# Patient Record
Sex: FEMALE
[De-identification: names, ages, dates, MRNs, and addresses within clinical notes are randomized per-mention and may not be internally consistent; named-entity substitution may affect disease eponyms.]

---

## 2018-03-04 ENCOUNTER — HOSPITAL ENCOUNTER (EMERGENCY)
Dept: HOSPITAL 90 - EDH | Age: 68
Discharge: HOME | End: 2018-03-04
Payer: COMMERCIAL

## 2018-03-04 DIAGNOSIS — R00.1: ICD-10-CM

## 2018-03-04 DIAGNOSIS — M19.90: ICD-10-CM

## 2018-03-04 DIAGNOSIS — E07.9: ICD-10-CM

## 2018-03-04 DIAGNOSIS — E78.00: ICD-10-CM

## 2018-03-04 DIAGNOSIS — R21: ICD-10-CM

## 2018-03-04 DIAGNOSIS — I16.9: Primary | ICD-10-CM

## 2018-03-04 DIAGNOSIS — Z90.49: ICD-10-CM

## 2018-03-04 LAB
ALBUMIN SERPL-MCNC: 3.2 G/DL (ref 3.5–5)
ALT SERPL-CCNC: 25 U/L (ref 12–78)
APTT PPP: 24 SEC (ref 26.3–35.5)
AST SERPL-CCNC: 19 U/L (ref 10–37)
BASOPHILS NFR BLD AUTO: 0.7 % (ref 0–5)
BILIRUB SERPL-MCNC: 0.3 MG/DL (ref 0.2–1)
BNP SERPL-MCNC: 123 PG/ML (ref 0–100)
BUN SERPL-MCNC: 36 MG/DL (ref 7–18)
CHLORIDE SERPL-SCNC: 107 MMOL/L (ref 101–111)
CK MB SERPL-MCNC: 0.9 NG/ML (ref 0.5–3.6)
CK SERPL-CCNC: 109 U/L (ref 21–232)
CO2 SERPL-SCNC: 30 MMOL/L (ref 21–32)
CREAT SERPL-MCNC: 1.6 MG/DL (ref 0.5–1.5)
EOSINOPHIL NFR BLD AUTO: 3.3 % (ref 0–8)
ERYTHROCYTE [DISTWIDTH] IN BLOOD BY AUTOMATED COUNT: 14 % (ref 11–15.5)
GFR SERPL CREATININE-BSD FRML MDRD: 34 ML/MIN (ref 60–?)
GLUCOSE SERPL-MCNC: 123 MG/DL (ref 70–105)
HCT VFR BLD AUTO: 35.8 % (ref 36–48)
INR PPP: 0.98 (ref 0.85–1.15)
LYMPHOCYTES NFR SPEC AUTO: 56.6 % (ref 21–51)
MCH RBC QN AUTO: 32.7 PG (ref 27–33)
MCHC RBC AUTO-ENTMCNC: 34.5 G/DL (ref 32–36)
MCV RBC AUTO: 95 FL (ref 79–99)
MONOCYTES NFR BLD AUTO: 6.4 % (ref 3–13)
NEUTROPHILS NFR BLD AUTO: 33 % (ref 40–77)
NRBC BLD MANUAL-RTO: 0 % (ref 0–0.19)
PH UR STRIP: 6 [PH] (ref 5–8)
PLATELET # BLD AUTO: 269 K/UL (ref 130–400)
POTASSIUM SERPL-SCNC: 4.2 MMOL/L (ref 3.5–5.1)
PROT SERPL-MCNC: 7.2 G/DL (ref 6–8.3)
PROT UR QL STRIP: 300
PROTHROMBIN TIME: 10.3 SEC (ref 9.6–11.6)
RBC # BLD AUTO: 3.77 MIL/UL (ref 4–5.5)
RBC #/AREA URNS HPF: (no result) /HPF (ref 0–1)
SODIUM SERPL-SCNC: 144 MMOL/L (ref 136–145)
SP GR UR STRIP: 1.01 (ref 1–1.03)
UROBILINOGEN UR STRIP-MCNC: 1 MG/DL (ref 0.2–1)
WBC # BLD AUTO: 5.8 K/UL (ref 4.8–10.8)
WBC #/AREA URNS HPF: (no result) /HPF (ref 0–1)

## 2018-03-04 PROCEDURE — 85610 PROTHROMBIN TIME: CPT

## 2018-03-04 PROCEDURE — 93005 ELECTROCARDIOGRAM TRACING: CPT

## 2018-03-04 PROCEDURE — 85025 COMPLETE CBC W/AUTO DIFF WBC: CPT

## 2018-03-04 PROCEDURE — 87088 URINE BACTERIA CULTURE: CPT

## 2018-03-04 PROCEDURE — 71045 X-RAY EXAM CHEST 1 VIEW: CPT

## 2018-03-04 PROCEDURE — 81001 URINALYSIS AUTO W/SCOPE: CPT

## 2018-03-04 PROCEDURE — 85730 THROMBOPLASTIN TIME PARTIAL: CPT

## 2018-03-04 PROCEDURE — 82553 CREATINE MB FRACTION: CPT

## 2018-03-04 PROCEDURE — 36415 COLL VENOUS BLD VENIPUNCTURE: CPT

## 2018-03-04 PROCEDURE — 87186 SC STD MICRODIL/AGAR DIL: CPT

## 2018-03-04 PROCEDURE — 84484 ASSAY OF TROPONIN QUANT: CPT

## 2018-03-04 PROCEDURE — 80053 COMPREHEN METABOLIC PANEL: CPT

## 2018-03-04 PROCEDURE — 83880 ASSAY OF NATRIURETIC PEPTIDE: CPT

## 2018-03-04 PROCEDURE — 82550 ASSAY OF CK (CPK): CPT

## 2018-03-07 ENCOUNTER — HOSPITAL ENCOUNTER (EMERGENCY)
Dept: HOSPITAL 90 - EDH | Age: 68
Discharge: HOME | End: 2018-03-07
Payer: COMMERCIAL

## 2018-03-07 DIAGNOSIS — R00.1: ICD-10-CM

## 2018-03-07 DIAGNOSIS — R42: ICD-10-CM

## 2018-03-07 DIAGNOSIS — I10: Primary | ICD-10-CM

## 2018-03-07 DIAGNOSIS — E07.9: ICD-10-CM

## 2018-03-07 DIAGNOSIS — M19.90: ICD-10-CM

## 2018-03-07 DIAGNOSIS — E78.00: ICD-10-CM

## 2018-03-07 DIAGNOSIS — R07.89: ICD-10-CM

## 2018-03-07 LAB
ALBUMIN SERPL-MCNC: 3.4 G/DL (ref 3.5–5)
ALT SERPL-CCNC: 25 U/L (ref 12–78)
AST SERPL-CCNC: 21 U/L (ref 10–37)
BASOPHILS NFR BLD AUTO: 0.8 % (ref 0–5)
BILIRUB SERPL-MCNC: 0.4 MG/DL (ref 0.2–1)
BUN SERPL-MCNC: 35 MG/DL (ref 7–18)
CHLORIDE SERPL-SCNC: 102 MMOL/L (ref 101–111)
CK MB SERPL-MCNC: 1.5 NG/ML (ref 0.5–3.6)
CK SERPL-CCNC: 178 U/L (ref 21–232)
CO2 SERPL-SCNC: 27 MMOL/L (ref 21–32)
CREAT SERPL-MCNC: 1.6 MG/DL (ref 0.5–1.5)
EOSINOPHIL NFR BLD AUTO: 3.5 % (ref 0–8)
ERYTHROCYTE [DISTWIDTH] IN BLOOD BY AUTOMATED COUNT: 14 % (ref 11–15.5)
GFR SERPL CREATININE-BSD FRML MDRD: 34 ML/MIN (ref 60–?)
GLUCOSE SERPL-MCNC: 126 MG/DL (ref 70–105)
HCT VFR BLD AUTO: 37.9 % (ref 36–48)
LYMPHOCYTES NFR SPEC AUTO: 48.1 % (ref 21–51)
MCH RBC QN AUTO: 33.2 PG (ref 27–33)
MCHC RBC AUTO-ENTMCNC: 35.4 G/DL (ref 32–36)
MCV RBC AUTO: 93.8 FL (ref 79–99)
MONOCYTES NFR BLD AUTO: 6.1 % (ref 3–13)
NEUTROPHILS NFR BLD AUTO: 41.5 % (ref 40–77)
NRBC BLD MANUAL-RTO: 0 % (ref 0–0.19)
PLATELET # BLD AUTO: 247 K/UL (ref 130–400)
POTASSIUM SERPL-SCNC: 4.5 MMOL/L (ref 3.5–5.1)
PROT SERPL-MCNC: 7.6 G/DL (ref 6–8.3)
RBC # BLD AUTO: 4.04 MIL/UL (ref 4–5.5)
SODIUM SERPL-SCNC: 139 MMOL/L (ref 136–145)
WBC # BLD AUTO: 6.6 K/UL (ref 4.8–10.8)

## 2018-03-07 PROCEDURE — 36415 COLL VENOUS BLD VENIPUNCTURE: CPT

## 2018-03-07 PROCEDURE — 93005 ELECTROCARDIOGRAM TRACING: CPT

## 2018-03-07 PROCEDURE — 85025 COMPLETE CBC W/AUTO DIFF WBC: CPT

## 2018-03-07 PROCEDURE — 82550 ASSAY OF CK (CPK): CPT

## 2018-03-07 PROCEDURE — 82553 CREATINE MB FRACTION: CPT

## 2018-03-07 PROCEDURE — 71045 X-RAY EXAM CHEST 1 VIEW: CPT

## 2018-03-07 PROCEDURE — 83880 ASSAY OF NATRIURETIC PEPTIDE: CPT

## 2018-03-07 PROCEDURE — 84484 ASSAY OF TROPONIN QUANT: CPT

## 2018-03-07 PROCEDURE — 80053 COMPREHEN METABOLIC PANEL: CPT

## 2018-03-09 ENCOUNTER — HOSPITAL ENCOUNTER (EMERGENCY)
Dept: HOSPITAL 90 - EDH | Age: 68
Discharge: HOME | End: 2018-03-09
Payer: COMMERCIAL

## 2018-03-09 DIAGNOSIS — R07.89: ICD-10-CM

## 2018-03-09 DIAGNOSIS — E11.9: ICD-10-CM

## 2018-03-09 DIAGNOSIS — N28.9: ICD-10-CM

## 2018-03-09 DIAGNOSIS — I10: Primary | ICD-10-CM

## 2018-03-09 DIAGNOSIS — M19.90: ICD-10-CM

## 2018-03-09 DIAGNOSIS — E78.00: ICD-10-CM

## 2018-03-09 DIAGNOSIS — R51: ICD-10-CM

## 2018-03-09 LAB
ALBUMIN SERPL-MCNC: 3.4 G/DL (ref 3.5–5)
ALT SERPL-CCNC: 24 U/L (ref 12–78)
APTT PPP: 21.7 SEC (ref 26.3–35.5)
AST SERPL-CCNC: 21 U/L (ref 10–37)
BASOPHILS NFR BLD AUTO: 1 % (ref 0–5)
BILIRUB SERPL-MCNC: 0.4 MG/DL (ref 0.2–1)
BUN SERPL-MCNC: 42 MG/DL (ref 7–18)
CHLORIDE SERPL-SCNC: 104 MMOL/L (ref 101–111)
CO2 SERPL-SCNC: 29 MMOL/L (ref 21–32)
CREAT SERPL-MCNC: 1.8 MG/DL (ref 0.5–1.5)
EOSINOPHIL NFR BLD AUTO: 3.3 % (ref 0–8)
ERYTHROCYTE [DISTWIDTH] IN BLOOD BY AUTOMATED COUNT: 14 % (ref 11–15.5)
GFR SERPL CREATININE-BSD FRML MDRD: 30 ML/MIN (ref 60–?)
GLUCOSE SERPL-MCNC: 134 MG/DL (ref 70–105)
HCT VFR BLD AUTO: 36.9 % (ref 36–48)
INR PPP: 0.97 (ref 0.85–1.15)
LYMPHOCYTES NFR SPEC AUTO: 46.9 % (ref 21–51)
MCH RBC QN AUTO: 33.1 PG (ref 27–33)
MCHC RBC AUTO-ENTMCNC: 34.2 G/DL (ref 32–36)
MCV RBC AUTO: 96.8 FL (ref 79–99)
MONOCYTES NFR BLD AUTO: 8 % (ref 3–13)
NEUTROPHILS NFR BLD AUTO: 40.8 % (ref 40–77)
NRBC BLD MANUAL-RTO: 0.1 % (ref 0–0.19)
PLATELET # BLD AUTO: 241 K/UL (ref 130–400)
POTASSIUM SERPL-SCNC: 4.2 MMOL/L (ref 3.5–5.1)
PROT SERPL-MCNC: 7.5 G/DL (ref 6–8.3)
PROTHROMBIN TIME: 10.2 SEC (ref 9.6–11.6)
RBC # BLD AUTO: 3.81 MIL/UL (ref 4–5.5)
SODIUM SERPL-SCNC: 142 MMOL/L (ref 136–145)
WBC # BLD AUTO: 5.8 K/UL (ref 4.8–10.8)

## 2018-03-09 PROCEDURE — 70450 CT HEAD/BRAIN W/O DYE: CPT

## 2018-03-09 PROCEDURE — 84484 ASSAY OF TROPONIN QUANT: CPT

## 2018-03-09 PROCEDURE — 36415 COLL VENOUS BLD VENIPUNCTURE: CPT

## 2018-03-09 PROCEDURE — 96375 TX/PRO/DX INJ NEW DRUG ADDON: CPT

## 2018-03-09 PROCEDURE — 85610 PROTHROMBIN TIME: CPT

## 2018-03-09 PROCEDURE — 85730 THROMBOPLASTIN TIME PARTIAL: CPT

## 2018-03-09 PROCEDURE — 99285 EMERGENCY DEPT VISIT HI MDM: CPT

## 2018-03-09 PROCEDURE — 85025 COMPLETE CBC W/AUTO DIFF WBC: CPT

## 2018-03-09 PROCEDURE — 93005 ELECTROCARDIOGRAM TRACING: CPT

## 2018-03-09 PROCEDURE — 80053 COMPREHEN METABOLIC PANEL: CPT

## 2018-03-09 PROCEDURE — 96374 THER/PROPH/DIAG INJ IV PUSH: CPT

## 2018-12-04 ENCOUNTER — HOSPITAL ENCOUNTER (OUTPATIENT)
Dept: HOSPITAL 90 - SHCH | Age: 68
Discharge: HOME | End: 2018-12-04
Attending: INTERNAL MEDICINE
Payer: COMMERCIAL

## 2018-12-04 DIAGNOSIS — I10: Primary | ICD-10-CM

## 2018-12-04 DIAGNOSIS — R06.02: ICD-10-CM

## 2018-12-04 DIAGNOSIS — R06.00: ICD-10-CM

## 2018-12-04 PROCEDURE — 78452 HT MUSCLE IMAGE SPECT MULT: CPT

## 2018-12-04 PROCEDURE — 96374 THER/PROPH/DIAG INJ IV PUSH: CPT

## 2018-12-04 PROCEDURE — 93017 CV STRESS TEST TRACING ONLY: CPT

## 2021-07-24 ENCOUNTER — HOSPITAL ENCOUNTER (OUTPATIENT)
Dept: HOSPITAL 90 - SHCH | Age: 71
Discharge: HOME | End: 2021-07-24
Attending: INTERNAL MEDICINE
Payer: COMMERCIAL

## 2021-07-24 DIAGNOSIS — I10: Primary | ICD-10-CM

## 2021-07-24 PROCEDURE — 93306 TTE W/DOPPLER COMPLETE: CPT

## 2021-07-24 PROCEDURE — 93356 MYOCRD STRAIN IMG SPCKL TRCK: CPT

## 2021-07-30 ENCOUNTER — HOSPITAL ENCOUNTER (OUTPATIENT)
Dept: HOSPITAL 90 - SHCH | Age: 71
Discharge: HOME | End: 2021-07-30
Attending: INTERNAL MEDICINE
Payer: COMMERCIAL

## 2021-07-30 VITALS — WEIGHT: 237 LBS | HEIGHT: 61 IN | BODY MASS INDEX: 44.75 KG/M2

## 2021-07-30 DIAGNOSIS — I10: Primary | ICD-10-CM

## 2021-07-30 DIAGNOSIS — R07.9: ICD-10-CM

## 2021-07-30 PROCEDURE — 93017 CV STRESS TEST TRACING ONLY: CPT

## 2021-07-30 PROCEDURE — 96374 THER/PROPH/DIAG INJ IV PUSH: CPT

## 2021-07-30 PROCEDURE — 78452 HT MUSCLE IMAGE SPECT MULT: CPT

## 2022-05-03 ENCOUNTER — HOSPITAL ENCOUNTER (EMERGENCY)
Dept: HOSPITAL 90 - EDH | Age: 72
Discharge: HOME | End: 2022-05-03
Payer: COMMERCIAL

## 2022-05-03 VITALS — WEIGHT: 244.93 LBS | BODY MASS INDEX: 46.24 KG/M2 | HEIGHT: 61 IN

## 2022-05-03 VITALS — SYSTOLIC BLOOD PRESSURE: 194 MMHG | DIASTOLIC BLOOD PRESSURE: 68 MMHG

## 2022-05-03 DIAGNOSIS — I25.10: ICD-10-CM

## 2022-05-03 DIAGNOSIS — I10: ICD-10-CM

## 2022-05-03 DIAGNOSIS — J20.9: Primary | ICD-10-CM

## 2022-05-03 DIAGNOSIS — E03.9: ICD-10-CM

## 2022-05-03 DIAGNOSIS — E11.9: ICD-10-CM

## 2022-05-03 DIAGNOSIS — M19.90: ICD-10-CM

## 2022-05-03 DIAGNOSIS — Z90.49: ICD-10-CM

## 2022-05-03 DIAGNOSIS — R07.89: ICD-10-CM

## 2022-05-03 DIAGNOSIS — Z79.899: ICD-10-CM

## 2022-05-03 DIAGNOSIS — Z20.822: ICD-10-CM

## 2022-05-03 LAB
ALBUMIN SERPL-MCNC: 2.1 G/DL (ref 3.5–5)
ALT SERPL-CCNC: 84 U/L (ref 12–78)
AST SERPL-CCNC: 46 U/L (ref 10–37)
BASOPHILS NFR BLD AUTO: 0.4 % (ref 0–5)
BILIRUB SERPL-MCNC: 0.2 MG/DL (ref 0.2–1)
BUN SERPL-MCNC: 63 MG/DL (ref 7–18)
CHLORIDE SERPL-SCNC: 115 MMOL/L (ref 101–111)
CO2 SERPL-SCNC: 22 MMOL/L (ref 21–32)
CREAT SERPL-MCNC: 3.2 MG/DL (ref 0.5–1.5)
EOSINOPHIL NFR BLD AUTO: 2.7 % (ref 0–8)
ERYTHROCYTE [DISTWIDTH] IN BLOOD BY AUTOMATED COUNT: 13.9 % (ref 11–15.5)
GFR SERPL CREATININE-BSD FRML MDRD: 15 ML/MIN (ref 60–?)
GLUCOSE SERPL-MCNC: 151 MG/DL (ref 70–105)
HCT VFR BLD AUTO: 33.8 % (ref 36–48)
LYMPHOCYTES NFR SPEC AUTO: 25 % (ref 21–51)
MCH RBC QN AUTO: 30.7 PG (ref 27–33)
MCHC RBC AUTO-ENTMCNC: 31.1 G/DL (ref 32–36)
MCV RBC AUTO: 98.8 FL (ref 79–99)
MONOCYTES NFR BLD AUTO: 5.6 % (ref 3–13)
NEUTROPHILS NFR BLD AUTO: 64.7 % (ref 40–77)
NRBC BLD MANUAL-RTO: 0 % (ref 0–0.19)
PLATELET # BLD AUTO: 207 K/UL (ref 130–400)
POTASSIUM SERPL-SCNC: 4.6 MMOL/L (ref 3.5–5.1)
PROT SERPL-MCNC: 6.4 G/DL (ref 6–8.3)
RBC # BLD AUTO: 3.42 MIL/UL (ref 4–5.5)
SODIUM SERPL-SCNC: 147 MMOL/L (ref 136–145)
WBC # BLD AUTO: 7 K/UL (ref 4.8–10.8)

## 2022-05-03 PROCEDURE — 93005 ELECTROCARDIOGRAM TRACING: CPT

## 2022-05-03 PROCEDURE — 87804 INFLUENZA ASSAY W/OPTIC: CPT

## 2022-05-03 PROCEDURE — 85025 COMPLETE CBC W/AUTO DIFF WBC: CPT

## 2022-05-03 PROCEDURE — 80053 COMPREHEN METABOLIC PANEL: CPT

## 2022-05-03 PROCEDURE — 36415 COLL VENOUS BLD VENIPUNCTURE: CPT

## 2022-05-03 PROCEDURE — 71045 X-RAY EXAM CHEST 1 VIEW: CPT

## 2022-05-03 PROCEDURE — 87635 SARS-COV-2 COVID-19 AMP PRB: CPT

## 2022-05-03 PROCEDURE — 99285 EMERGENCY DEPT VISIT HI MDM: CPT

## 2022-05-03 PROCEDURE — 84484 ASSAY OF TROPONIN QUANT: CPT

## 2022-06-21 ENCOUNTER — HOSPITAL ENCOUNTER (OUTPATIENT)
Dept: HOSPITAL 90 - SHCH | Age: 72
Discharge: HOME | End: 2022-06-21
Attending: INTERNAL MEDICINE
Payer: COMMERCIAL

## 2022-06-21 DIAGNOSIS — I25.119: ICD-10-CM

## 2022-06-21 DIAGNOSIS — I08.2: Primary | ICD-10-CM

## 2022-06-21 DIAGNOSIS — I27.20: ICD-10-CM

## 2022-06-21 PROCEDURE — 93306 TTE W/DOPPLER COMPLETE: CPT

## 2023-01-11 ENCOUNTER — HOSPITAL ENCOUNTER (INPATIENT)
Dept: HOSPITAL 90 - EDH | Age: 73
LOS: 11 days | Discharge: NURSING, REHAB OR LONG-TERM CARE | DRG: 640 | End: 2023-01-22
Attending: INTERNAL MEDICINE | Admitting: INTERNAL MEDICINE
Payer: COMMERCIAL

## 2023-01-11 VITALS — BODY MASS INDEX: 41.11 KG/M2 | WEIGHT: 232 LBS | HEIGHT: 63 IN

## 2023-01-11 VITALS — SYSTOLIC BLOOD PRESSURE: 136 MMHG | DIASTOLIC BLOOD PRESSURE: 58 MMHG

## 2023-01-11 VITALS — SYSTOLIC BLOOD PRESSURE: 101 MMHG | DIASTOLIC BLOOD PRESSURE: 42 MMHG

## 2023-01-11 DIAGNOSIS — I12.0: ICD-10-CM

## 2023-01-11 DIAGNOSIS — D64.9: ICD-10-CM

## 2023-01-11 DIAGNOSIS — E78.00: ICD-10-CM

## 2023-01-11 DIAGNOSIS — Z20.822: ICD-10-CM

## 2023-01-11 DIAGNOSIS — E87.6: ICD-10-CM

## 2023-01-11 DIAGNOSIS — N18.6: ICD-10-CM

## 2023-01-11 DIAGNOSIS — E66.01: ICD-10-CM

## 2023-01-11 DIAGNOSIS — J96.01: ICD-10-CM

## 2023-01-11 DIAGNOSIS — J81.1: ICD-10-CM

## 2023-01-11 DIAGNOSIS — E87.70: Primary | ICD-10-CM

## 2023-01-11 DIAGNOSIS — Z91.199: ICD-10-CM

## 2023-01-11 DIAGNOSIS — Z79.84: ICD-10-CM

## 2023-01-11 DIAGNOSIS — E11.51: ICD-10-CM

## 2023-01-11 DIAGNOSIS — Z79.82: ICD-10-CM

## 2023-01-11 DIAGNOSIS — E11.22: ICD-10-CM

## 2023-01-11 DIAGNOSIS — Z99.2: ICD-10-CM

## 2023-01-11 LAB
ALBUMIN SERPL-MCNC: 2.5 G/DL (ref 3.5–5)
ALT SERPL-CCNC: 18 U/L (ref 12–78)
APPEARANCE UR: CLEAR
AST SERPL-CCNC: 21 U/L (ref 10–37)
BACTERIA URNS QL MICRO: (no result) /HPF
BASE EXCESS BLDA CALC-SCNC: -12.8 MMOL/L (ref -2–3)
BASOPHILS NFR BLD AUTO: 0.3 % (ref 0–5)
BILIRUB UR QL STRIP: NEGATIVE MG/DL
BNP SERPL-MCNC: 1830 PG/ML (ref 0–100)
BUN SERPL-MCNC: 64 MG/DL (ref 7–18)
CHLORIDE SERPL-SCNC: 109 MMOL/L (ref 101–111)
CO2 SERPL-SCNC: 20 MMOL/L (ref 21–32)
COLOR UR: (no result)
CREAT SERPL-MCNC: 3.7 MG/DL (ref 0.5–1.5)
DEPRECATED SQUAMOUS URNS QL MICRO: (no result) /HPF (ref 0–2)
EOSINOPHIL NFR BLD AUTO: 0.1 % (ref 0–8)
ERYTHROCYTE [DISTWIDTH] IN BLOOD BY AUTOMATED COUNT: 14.1 % (ref 11–15.5)
GFR SERPL CREATININE-BSD FRML MDRD: 13 ML/MIN (ref 60–?)
GLUCOSE SERPL-MCNC: 170 MG/DL (ref 70–105)
GLUCOSE UR STRIP-MCNC: 200 MG/DL
HCO3 BLDA-SCNC: 16 MMOL/L (ref 21–28)
HCT VFR BLD AUTO: 32.5 % (ref 36–48)
HGB UR QL STRIP: NEGATIVE
KETONES UR STRIP-MCNC: NEGATIVE MG/DL
LEUKOCYTE ESTERASE UR QL STRIP: NEGATIVE LEU/UL
LYMPHOCYTES NFR SPEC AUTO: 11.5 % (ref 21–51)
MCH RBC QN AUTO: 31.4 PG (ref 27–33)
MCHC RBC AUTO-ENTMCNC: 31.7 G/DL (ref 32–36)
MCV RBC AUTO: 99.1 FL (ref 79–99)
MONOCYTES NFR BLD AUTO: 6.9 % (ref 3–13)
MUCOUS THREADS URNS QL MICRO: (no result) LPF
NEUTROPHILS NFR BLD AUTO: 79.7 % (ref 40–77)
NITRITE UR QL STRIP: NEGATIVE
NRBC BLD MANUAL-RTO: 0.3 % (ref 0–0.19)
PCO2 BLDA: 49 MMHG (ref 32–45)
PH UR STRIP: 6 [PH] (ref 5–8)
PLATELET # BLD AUTO: 178 K/UL (ref 130–400)
POTASSIUM SERPL-SCNC: 5.1 MMOL/L (ref 3.5–5.1)
PROT SERPL-MCNC: 6.7 G/DL (ref 6–8.3)
PROT UR QL STRIP: 600 MG/DL
RBC # BLD AUTO: 3.28 MIL/UL (ref 4–5.5)
RBC #/AREA URNS HPF: (no result) /HPF (ref 0–1)
SAO2 % BLDA: 83.8 % (ref 95–99)
SODIUM SERPL-SCNC: 139 MMOL/L (ref 136–145)
SP GR UR STRIP: 1.02 (ref 1–1.03)
UROBILINOGEN UR STRIP-MCNC: 0.2 MG/DL (ref 0.2–1)
WBC # BLD AUTO: 9.8 K/UL (ref 4.8–10.8)
WBC #/AREA URNS HPF: (no result) /HPF (ref 0–1)

## 2023-01-11 PROCEDURE — 82040 ASSAY OF SERUM ALBUMIN: CPT

## 2023-01-11 PROCEDURE — 94640 AIRWAY INHALATION TREATMENT: CPT

## 2023-01-11 PROCEDURE — 36580 REPLACE CVAD CATH: CPT

## 2023-01-11 PROCEDURE — 82565 ASSAY OF CREATININE: CPT

## 2023-01-11 PROCEDURE — 82607 VITAMIN B-12: CPT

## 2023-01-11 PROCEDURE — 36556 INSERT NON-TUNNEL CV CATH: CPT

## 2023-01-11 PROCEDURE — 83550 IRON BINDING TEST: CPT

## 2023-01-11 PROCEDURE — 5A09357 ASSISTANCE WITH RESPIRATORY VENTILATION, LESS THAN 24 CONSECUTIVE HOURS, CONTINUOUS POSITIVE AIRWAY PRESSURE: ICD-10-PCS | Performed by: INTERNAL MEDICINE

## 2023-01-11 PROCEDURE — 86704 HEP B CORE ANTIBODY TOTAL: CPT

## 2023-01-11 PROCEDURE — 82948 REAGENT STRIP/BLOOD GLUCOSE: CPT

## 2023-01-11 PROCEDURE — 87040 BLOOD CULTURE FOR BACTERIA: CPT

## 2023-01-11 PROCEDURE — 85014 HEMATOCRIT: CPT

## 2023-01-11 PROCEDURE — 85045 AUTOMATED RETICULOCYTE COUNT: CPT

## 2023-01-11 PROCEDURE — 80061 LIPID PANEL: CPT

## 2023-01-11 PROCEDURE — 82746 ASSAY OF FOLIC ACID SERUM: CPT

## 2023-01-11 PROCEDURE — 85025 COMPLETE CBC W/AUTO DIFF WBC: CPT

## 2023-01-11 PROCEDURE — 84520 ASSAY OF UREA NITROGEN: CPT

## 2023-01-11 PROCEDURE — 86706 HEP B SURFACE ANTIBODY: CPT

## 2023-01-11 PROCEDURE — 71045 X-RAY EXAM CHEST 1 VIEW: CPT

## 2023-01-11 PROCEDURE — 84132 ASSAY OF SERUM POTASSIUM: CPT

## 2023-01-11 PROCEDURE — 85027 COMPLETE CBC AUTOMATED: CPT

## 2023-01-11 PROCEDURE — 87340 HEPATITIS B SURFACE AG IA: CPT

## 2023-01-11 PROCEDURE — 84145 PROCALCITONIN (PCT): CPT

## 2023-01-11 PROCEDURE — 77001 FLUOROGUIDE FOR VEIN DEVICE: CPT

## 2023-01-11 PROCEDURE — 93005 ELECTROCARDIOGRAM TRACING: CPT

## 2023-01-11 PROCEDURE — 93306 TTE W/DOPPLER COMPLETE: CPT

## 2023-01-11 PROCEDURE — 83880 ASSAY OF NATRIURETIC PEPTIDE: CPT

## 2023-01-11 PROCEDURE — 85018 HEMOGLOBIN: CPT

## 2023-01-11 PROCEDURE — 84550 ASSAY OF BLOOD/URIC ACID: CPT

## 2023-01-11 PROCEDURE — 84484 ASSAY OF TROPONIN QUANT: CPT

## 2023-01-11 PROCEDURE — 80048 BASIC METABOLIC PNL TOTAL CA: CPT

## 2023-01-11 PROCEDURE — 94660 CPAP INITIATION&MGMT: CPT

## 2023-01-11 PROCEDURE — 90935 HEMODIALYSIS ONE EVALUATION: CPT

## 2023-01-11 PROCEDURE — 93971 EXTREMITY STUDY: CPT

## 2023-01-11 PROCEDURE — 36415 COLL VENOUS BLD VENIPUNCTURE: CPT

## 2023-01-11 PROCEDURE — 93356 MYOCRD STRAIN IMG SPCKL TRCK: CPT

## 2023-01-11 PROCEDURE — 93970 EXTREMITY STUDY: CPT

## 2023-01-11 PROCEDURE — 84295 ASSAY OF SERUM SODIUM: CPT

## 2023-01-11 PROCEDURE — 81001 URINALYSIS AUTO W/SCOPE: CPT

## 2023-01-11 PROCEDURE — 36600 WITHDRAWAL OF ARTERIAL BLOOD: CPT

## 2023-01-11 PROCEDURE — 82435 ASSAY OF BLOOD CHLORIDE: CPT

## 2023-01-11 PROCEDURE — 87635 SARS-COV-2 COVID-19 AMP PRB: CPT

## 2023-01-11 PROCEDURE — 86701 HIV-1ANTIBODY: CPT

## 2023-01-11 PROCEDURE — 83735 ASSAY OF MAGNESIUM: CPT

## 2023-01-11 PROCEDURE — 82728 ASSAY OF FERRITIN: CPT

## 2023-01-11 PROCEDURE — 83605 ASSAY OF LACTIC ACID: CPT

## 2023-01-11 PROCEDURE — 87390 HIV-1 AG IA: CPT

## 2023-01-11 PROCEDURE — 84443 ASSAY THYROID STIM HORMONE: CPT

## 2023-01-11 PROCEDURE — 82947 ASSAY GLUCOSE BLOOD QUANT: CPT

## 2023-01-11 PROCEDURE — 82803 BLOOD GASES ANY COMBINATION: CPT

## 2023-01-11 PROCEDURE — 85730 THROMBOPLASTIN TIME PARTIAL: CPT

## 2023-01-11 PROCEDURE — 85610 PROTHROMBIN TIME: CPT

## 2023-01-11 PROCEDURE — 83540 ASSAY OF IRON: CPT

## 2023-01-11 PROCEDURE — 84100 ASSAY OF PHOSPHORUS: CPT

## 2023-01-11 PROCEDURE — 97039 UNLISTED MODALITY: CPT

## 2023-01-11 PROCEDURE — 80053 COMPREHEN METABOLIC PANEL: CPT

## 2023-01-11 RX ADMIN — FUROSEMIDE SCH MG: 10 INJECTION, SOLUTION INTRAMUSCULAR; INTRAVENOUS at 20:11

## 2023-01-11 RX ADMIN — FAMOTIDINE SCH MG: 20 TABLET, FILM COATED ORAL at 20:12

## 2023-01-11 RX ADMIN — HEPARIN SODIUM SCH UNIT: 5000 INJECTION, SOLUTION INTRAVENOUS; SUBCUTANEOUS at 14:38

## 2023-01-11 RX ADMIN — PIPERACILLIN SODIUM AND TAZOBACTAM SODIUM SCH MLS/HR: .375; 3 INJECTION, POWDER, LYOPHILIZED, FOR SOLUTION INTRAVENOUS at 20:11

## 2023-01-11 RX ADMIN — SODIUM CHLORIDE SCH UNIT: 9 INJECTION, SOLUTION INTRAVENOUS at 21:00

## 2023-01-11 RX ADMIN — SODIUM CHLORIDE SCH UNIT: 9 INJECTION, SOLUTION INTRAVENOUS at 15:47

## 2023-01-11 RX ADMIN — HEPARIN SODIUM SCH UNIT: 5000 INJECTION, SOLUTION INTRAVENOUS; SUBCUTANEOUS at 20:11

## 2023-01-11 RX ADMIN — FAMOTIDINE SCH MG: 10 INJECTION INTRAVENOUS at 20:12

## 2023-01-12 VITALS — DIASTOLIC BLOOD PRESSURE: 93 MMHG | SYSTOLIC BLOOD PRESSURE: 124 MMHG

## 2023-01-12 VITALS — SYSTOLIC BLOOD PRESSURE: 138 MMHG | DIASTOLIC BLOOD PRESSURE: 61 MMHG

## 2023-01-12 VITALS — DIASTOLIC BLOOD PRESSURE: 56 MMHG | SYSTOLIC BLOOD PRESSURE: 136 MMHG

## 2023-01-12 VITALS — DIASTOLIC BLOOD PRESSURE: 48 MMHG | SYSTOLIC BLOOD PRESSURE: 140 MMHG

## 2023-01-12 VITALS — DIASTOLIC BLOOD PRESSURE: 55 MMHG | SYSTOLIC BLOOD PRESSURE: 136 MMHG

## 2023-01-12 VITALS — SYSTOLIC BLOOD PRESSURE: 143 MMHG | DIASTOLIC BLOOD PRESSURE: 45 MMHG

## 2023-01-12 LAB
ALBUMIN SERPL-MCNC: 2.3 G/DL (ref 3.5–5)
BASOPHILS NFR BLD AUTO: 0.3 % (ref 0–5)
BUN SERPL-MCNC: 76 MG/DL (ref 7–18)
BUN SERPL-MCNC: 83 MG/DL (ref 7–18)
CHLORIDE SERPL-SCNC: 109 MMOL/L (ref 101–111)
CHOLEST SERPL-MCNC: 132 MG/DL (ref ?–200)
CO2 SERPL-SCNC: 18 MMOL/L (ref 21–32)
CREAT SERPL-MCNC: 4.3 MG/DL (ref 0.5–1.5)
CREAT SERPL-MCNC: 5 MG/DL (ref 0.5–1.5)
EOSINOPHIL NFR BLD AUTO: 0.4 % (ref 0–8)
ERYTHROCYTE [DISTWIDTH] IN BLOOD BY AUTOMATED COUNT: 14.1 % (ref 11–15.5)
GFR SERPL CREATININE-BSD FRML MDRD: 11 ML/MIN (ref 60–?)
GFR SERPL CREATININE-BSD FRML MDRD: 9 ML/MIN (ref 60–?)
GLUCOSE SERPL-MCNC: 110 MG/DL (ref 70–105)
HCT VFR BLD AUTO: 28.8 % (ref 36–48)
HCT VFR BLD AUTO: 31.5 % (ref 36–48)
HDLC SERPL-MCNC: 70 MG/DL (ref 35–85)
IRON SATN MFR SERPL: 16.2 % (ref 22–44)
IRON SATN MFR SERPL: 17 % (ref 22–44)
IRON SERPL-MCNC: 36 MCG/DL (ref 50–170)
IRON SERPL-MCNC: 42 MCG/DL (ref 50–170)
LDLC SERPL CALC-MCNC: 37 MG/DL (ref 0–99)
LYMPHOCYTES NFR SPEC AUTO: 18.1 % (ref 21–51)
MAGNESIUM SERPL-MCNC: 2.8 MG/DL (ref 1.8–2.4)
MCH RBC QN AUTO: 31.4 PG (ref 27–33)
MCHC RBC AUTO-ENTMCNC: 31.3 G/DL (ref 32–36)
MCV RBC AUTO: 100.3 FL (ref 79–99)
MONOCYTES NFR BLD AUTO: 8.7 % (ref 3–13)
NEUTROPHILS NFR BLD AUTO: 71.6 % (ref 40–77)
NRBC BLD MANUAL-RTO: 0.4 % (ref 0–0.19)
PHOSPHATE SERPL-MCNC: 7.4 MG/DL (ref 2.5–4.9)
PLATELET # BLD AUTO: 150 K/UL (ref 130–400)
POTASSIUM SERPL-SCNC: 5 MMOL/L (ref 3.5–5.1)
RBC # BLD AUTO: 2.87 MIL/UL (ref 4–5.5)
SODIUM SERPL-SCNC: 135 MMOL/L (ref 136–145)
TIBC SERPL-MCNC: 221 MCG/DL (ref 250–450)
TIBC SERPL-MCNC: 247 MCG/DL (ref 250–450)
TRIGL SERPL-MCNC: 178 MG/DL (ref 30–200)
URATE SERPL-MCNC: 8.9 MG/DL (ref 2.6–7.2)
WBC # BLD AUTO: 7.6 K/UL (ref 4.8–10.8)

## 2023-01-12 PROCEDURE — 5A09357 ASSISTANCE WITH RESPIRATORY VENTILATION, LESS THAN 24 CONSECUTIVE HOURS, CONTINUOUS POSITIVE AIRWAY PRESSURE: ICD-10-PCS | Performed by: INTERNAL MEDICINE

## 2023-01-12 RX ADMIN — FAMOTIDINE SCH MG: 10 INJECTION INTRAVENOUS at 20:28

## 2023-01-12 RX ADMIN — HEPARIN SODIUM SCH UNIT: 5000 INJECTION, SOLUTION INTRAVENOUS; SUBCUTANEOUS at 09:16

## 2023-01-12 RX ADMIN — FUROSEMIDE SCH MG: 10 INJECTION, SOLUTION INTRAMUSCULAR; INTRAVENOUS at 20:33

## 2023-01-12 RX ADMIN — ACETAMINOPHEN PRN MG: 325 TABLET, FILM COATED ORAL at 09:26

## 2023-01-12 RX ADMIN — HEPARIN SODIUM SCH UNIT: 5000 INJECTION, SOLUTION INTRAVENOUS; SUBCUTANEOUS at 20:29

## 2023-01-12 RX ADMIN — SODIUM CHLORIDE SCH UNIT: 9 INJECTION, SOLUTION INTRAVENOUS at 20:58

## 2023-01-12 RX ADMIN — PIPERACILLIN SODIUM AND TAZOBACTAM SODIUM SCH MLS/HR: .375; 3 INJECTION, POWDER, LYOPHILIZED, FOR SOLUTION INTRAVENOUS at 20:29

## 2023-01-12 RX ADMIN — SODIUM CHLORIDE SCH UNIT: 9 INJECTION, SOLUTION INTRAVENOUS at 06:03

## 2023-01-12 RX ADMIN — HEPARIN SODIUM SCH UNIT: 5000 INJECTION, SOLUTION INTRAVENOUS; SUBCUTANEOUS at 13:31

## 2023-01-12 RX ADMIN — SODIUM CHLORIDE SCH MLS/HR: 9 INJECTION, SOLUTION INTRAVENOUS at 19:17

## 2023-01-12 RX ADMIN — SODIUM CHLORIDE SCH UNIT: 9 INJECTION, SOLUTION INTRAVENOUS at 16:30

## 2023-01-12 RX ADMIN — ASCORBIC ACID, FOLIC ACID, NIACIN, THIAMINE, RIBOFLAVIN, PYRIDOXINE, CYANOCOBALAMIN, PANTOTHENIC ACID, BIOTIN SCH CAP: 100; 150; 6; 1; 20; 5; 10; 1.7; 1.5 CAPSULE, LIQUID FILLED ORAL at 09:09

## 2023-01-12 RX ADMIN — SODIUM CHLORIDE SCH UNIT: 9 INJECTION, SOLUTION INTRAVENOUS at 11:23

## 2023-01-12 RX ADMIN — FUROSEMIDE SCH MG: 10 INJECTION, SOLUTION INTRAMUSCULAR; INTRAVENOUS at 09:09

## 2023-01-12 RX ADMIN — FAMOTIDINE SCH MG: 20 TABLET, FILM COATED ORAL at 20:33

## 2023-01-12 RX ADMIN — PIPERACILLIN SODIUM AND TAZOBACTAM SODIUM SCH MLS/HR: .375; 3 INJECTION, POWDER, LYOPHILIZED, FOR SOLUTION INTRAVENOUS at 09:12

## 2023-01-13 VITALS — DIASTOLIC BLOOD PRESSURE: 66 MMHG | SYSTOLIC BLOOD PRESSURE: 163 MMHG

## 2023-01-13 VITALS — SYSTOLIC BLOOD PRESSURE: 140 MMHG | DIASTOLIC BLOOD PRESSURE: 63 MMHG

## 2023-01-13 VITALS — SYSTOLIC BLOOD PRESSURE: 157 MMHG | DIASTOLIC BLOOD PRESSURE: 66 MMHG

## 2023-01-13 VITALS — DIASTOLIC BLOOD PRESSURE: 56 MMHG | SYSTOLIC BLOOD PRESSURE: 122 MMHG

## 2023-01-13 VITALS — SYSTOLIC BLOOD PRESSURE: 160 MMHG | DIASTOLIC BLOOD PRESSURE: 53 MMHG

## 2023-01-13 VITALS — DIASTOLIC BLOOD PRESSURE: 68 MMHG | SYSTOLIC BLOOD PRESSURE: 169 MMHG

## 2023-01-13 VITALS — DIASTOLIC BLOOD PRESSURE: 57 MMHG | SYSTOLIC BLOOD PRESSURE: 135 MMHG

## 2023-01-13 VITALS — DIASTOLIC BLOOD PRESSURE: 74 MMHG | SYSTOLIC BLOOD PRESSURE: 164 MMHG

## 2023-01-13 VITALS — DIASTOLIC BLOOD PRESSURE: 78 MMHG | SYSTOLIC BLOOD PRESSURE: 166 MMHG

## 2023-01-13 VITALS — SYSTOLIC BLOOD PRESSURE: 145 MMHG | DIASTOLIC BLOOD PRESSURE: 68 MMHG

## 2023-01-13 VITALS — DIASTOLIC BLOOD PRESSURE: 72 MMHG | SYSTOLIC BLOOD PRESSURE: 147 MMHG

## 2023-01-13 VITALS — SYSTOLIC BLOOD PRESSURE: 129 MMHG | DIASTOLIC BLOOD PRESSURE: 60 MMHG

## 2023-01-13 VITALS — DIASTOLIC BLOOD PRESSURE: 71 MMHG | SYSTOLIC BLOOD PRESSURE: 164 MMHG

## 2023-01-13 VITALS — SYSTOLIC BLOOD PRESSURE: 164 MMHG | DIASTOLIC BLOOD PRESSURE: 71 MMHG

## 2023-01-13 VITALS — DIASTOLIC BLOOD PRESSURE: 83 MMHG | SYSTOLIC BLOOD PRESSURE: 148 MMHG

## 2023-01-13 VITALS — DIASTOLIC BLOOD PRESSURE: 64 MMHG | SYSTOLIC BLOOD PRESSURE: 136 MMHG

## 2023-01-13 VITALS — DIASTOLIC BLOOD PRESSURE: 72 MMHG | SYSTOLIC BLOOD PRESSURE: 158 MMHG

## 2023-01-13 VITALS — SYSTOLIC BLOOD PRESSURE: 146 MMHG | DIASTOLIC BLOOD PRESSURE: 54 MMHG

## 2023-01-13 VITALS — SYSTOLIC BLOOD PRESSURE: 128 MMHG | DIASTOLIC BLOOD PRESSURE: 91 MMHG

## 2023-01-13 VITALS — SYSTOLIC BLOOD PRESSURE: 148 MMHG | DIASTOLIC BLOOD PRESSURE: 66 MMHG

## 2023-01-13 LAB
APTT PPP: 40 SEC (ref 26.3–35.5)
BASOPHILS NFR BLD AUTO: 0.3 % (ref 0–5)
BUN SERPL-MCNC: 87 MG/DL (ref 7–18)
CHLORIDE SERPL-SCNC: 110 MMOL/L (ref 101–111)
CO2 SERPL-SCNC: 16 MMOL/L (ref 21–32)
CREAT SERPL-MCNC: 5.5 MG/DL (ref 0.5–1.5)
EOSINOPHIL NFR BLD AUTO: 2 % (ref 0–8)
ERYTHROCYTE [DISTWIDTH] IN BLOOD BY AUTOMATED COUNT: 14.1 % (ref 11–15.5)
GFR SERPL CREATININE-BSD FRML MDRD: 8 ML/MIN (ref 60–?)
GLUCOSE SERPL-MCNC: 122 MG/DL (ref 70–105)
HCT VFR BLD AUTO: 30.5 % (ref 36–48)
INR PPP: 0.98 (ref 0.85–1.15)
LYMPHOCYTES NFR SPEC AUTO: 18.5 % (ref 21–51)
MAGNESIUM SERPL-MCNC: 3 MG/DL (ref 1.8–2.4)
MCH RBC QN AUTO: 30.9 PG (ref 27–33)
MCHC RBC AUTO-ENTMCNC: 31.1 G/DL (ref 32–36)
MCV RBC AUTO: 99.3 FL (ref 79–99)
MONOCYTES NFR BLD AUTO: 8.7 % (ref 3–13)
NEUTROPHILS NFR BLD AUTO: 69.3 % (ref 40–77)
NRBC BLD MANUAL-RTO: 0.4 % (ref 0–0.19)
PHOSPHATE SERPL-MCNC: 8.4 MG/DL (ref 2.5–4.9)
PLATELET # BLD AUTO: 168 K/UL (ref 130–400)
POTASSIUM SERPL-SCNC: 5.3 MMOL/L (ref 3.5–5.1)
PROTHROMBIN TIME: 10.7 SEC (ref 9.6–11.6)
RBC # BLD AUTO: 3.07 MIL/UL (ref 4–5.5)
SODIUM SERPL-SCNC: 140 MMOL/L (ref 136–145)
WBC # BLD AUTO: 6.9 K/UL (ref 4.8–10.8)

## 2023-01-13 PROCEDURE — B5181ZA FLUOROSCOPY OF SUPERIOR VENA CAVA USING LOW OSMOLAR CONTRAST, GUIDANCE: ICD-10-PCS

## 2023-01-13 PROCEDURE — 5A09357 ASSISTANCE WITH RESPIRATORY VENTILATION, LESS THAN 24 CONSECUTIVE HOURS, CONTINUOUS POSITIVE AIRWAY PRESSURE: ICD-10-PCS | Performed by: INTERNAL MEDICINE

## 2023-01-13 PROCEDURE — 02HV33Z INSERTION OF INFUSION DEVICE INTO SUPERIOR VENA CAVA, PERCUTANEOUS APPROACH: ICD-10-PCS

## 2023-01-13 PROCEDURE — 5A1D70Z PERFORMANCE OF URINARY FILTRATION, INTERMITTENT, LESS THAN 6 HOURS PER DAY: ICD-10-PCS | Performed by: INTERNAL MEDICINE

## 2023-01-13 RX ADMIN — SODIUM CHLORIDE SCH UNIT: 9 INJECTION, SOLUTION INTRAVENOUS at 11:30

## 2023-01-13 RX ADMIN — FAMOTIDINE SCH MG: 10 INJECTION INTRAVENOUS at 22:06

## 2023-01-13 RX ADMIN — FAMOTIDINE SCH MG: 20 TABLET, FILM COATED ORAL at 21:00

## 2023-01-13 RX ADMIN — ASCORBIC ACID, FOLIC ACID, NIACIN, THIAMINE, RIBOFLAVIN, PYRIDOXINE, CYANOCOBALAMIN, PANTOTHENIC ACID, BIOTIN SCH CAP: 100; 150; 6; 1; 20; 5; 10; 1.7; 1.5 CAPSULE, LIQUID FILLED ORAL at 09:00

## 2023-01-13 RX ADMIN — SODIUM CHLORIDE SCH UNIT: 9 INJECTION, SOLUTION INTRAVENOUS at 21:00

## 2023-01-13 RX ADMIN — FUROSEMIDE SCH MG: 10 INJECTION, SOLUTION INTRAMUSCULAR; INTRAVENOUS at 12:18

## 2023-01-13 RX ADMIN — SODIUM CHLORIDE SCH MLS/HR: 9 INJECTION, SOLUTION INTRAVENOUS at 22:06

## 2023-01-13 RX ADMIN — SODIUM CHLORIDE SCH UNIT: 9 INJECTION, SOLUTION INTRAVENOUS at 16:24

## 2023-01-13 RX ADMIN — HEPARIN SODIUM SCH UNIT: 5000 INJECTION, SOLUTION INTRAVENOUS; SUBCUTANEOUS at 22:29

## 2023-01-13 RX ADMIN — PIPERACILLIN SODIUM AND TAZOBACTAM SODIUM SCH MLS/HR: .375; 3 INJECTION, POWDER, LYOPHILIZED, FOR SOLUTION INTRAVENOUS at 11:54

## 2023-01-13 RX ADMIN — PIPERACILLIN SODIUM AND TAZOBACTAM SODIUM SCH MLS/HR: .375; 3 INJECTION, POWDER, LYOPHILIZED, FOR SOLUTION INTRAVENOUS at 22:06

## 2023-01-13 RX ADMIN — HEPARIN SODIUM SCH UNIT: 5000 INJECTION, SOLUTION INTRAVENOUS; SUBCUTANEOUS at 16:36

## 2023-01-13 RX ADMIN — FUROSEMIDE SCH MG: 10 INJECTION, SOLUTION INTRAMUSCULAR; INTRAVENOUS at 22:06

## 2023-01-13 RX ADMIN — SODIUM CHLORIDE SCH UNIT: 9 INJECTION, SOLUTION INTRAVENOUS at 06:34

## 2023-01-13 RX ADMIN — HEPARIN SODIUM SCH UNIT: 5000 INJECTION, SOLUTION INTRAVENOUS; SUBCUTANEOUS at 09:00

## 2023-01-14 VITALS — DIASTOLIC BLOOD PRESSURE: 53 MMHG | SYSTOLIC BLOOD PRESSURE: 135 MMHG

## 2023-01-14 VITALS — DIASTOLIC BLOOD PRESSURE: 55 MMHG | SYSTOLIC BLOOD PRESSURE: 145 MMHG

## 2023-01-14 VITALS — SYSTOLIC BLOOD PRESSURE: 119 MMHG | DIASTOLIC BLOOD PRESSURE: 52 MMHG

## 2023-01-14 VITALS — SYSTOLIC BLOOD PRESSURE: 175 MMHG | DIASTOLIC BLOOD PRESSURE: 71 MMHG

## 2023-01-14 VITALS — SYSTOLIC BLOOD PRESSURE: 123 MMHG | DIASTOLIC BLOOD PRESSURE: 57 MMHG

## 2023-01-14 VITALS — SYSTOLIC BLOOD PRESSURE: 109 MMHG | DIASTOLIC BLOOD PRESSURE: 54 MMHG

## 2023-01-14 VITALS — SYSTOLIC BLOOD PRESSURE: 127 MMHG | DIASTOLIC BLOOD PRESSURE: 49 MMHG

## 2023-01-14 VITALS — DIASTOLIC BLOOD PRESSURE: 80 MMHG | SYSTOLIC BLOOD PRESSURE: 133 MMHG

## 2023-01-14 VITALS — SYSTOLIC BLOOD PRESSURE: 98 MMHG | DIASTOLIC BLOOD PRESSURE: 52 MMHG

## 2023-01-14 VITALS — DIASTOLIC BLOOD PRESSURE: 47 MMHG | SYSTOLIC BLOOD PRESSURE: 116 MMHG

## 2023-01-14 VITALS — SYSTOLIC BLOOD PRESSURE: 169 MMHG | DIASTOLIC BLOOD PRESSURE: 72 MMHG

## 2023-01-14 VITALS — SYSTOLIC BLOOD PRESSURE: 125 MMHG | DIASTOLIC BLOOD PRESSURE: 62 MMHG

## 2023-01-14 VITALS — SYSTOLIC BLOOD PRESSURE: 130 MMHG | DIASTOLIC BLOOD PRESSURE: 54 MMHG

## 2023-01-14 VITALS — DIASTOLIC BLOOD PRESSURE: 52 MMHG | SYSTOLIC BLOOD PRESSURE: 134 MMHG

## 2023-01-14 VITALS — DIASTOLIC BLOOD PRESSURE: 53 MMHG | SYSTOLIC BLOOD PRESSURE: 120 MMHG

## 2023-01-14 VITALS — SYSTOLIC BLOOD PRESSURE: 96 MMHG | DIASTOLIC BLOOD PRESSURE: 51 MMHG

## 2023-01-14 VITALS — DIASTOLIC BLOOD PRESSURE: 43 MMHG | SYSTOLIC BLOOD PRESSURE: 116 MMHG

## 2023-01-14 VITALS — DIASTOLIC BLOOD PRESSURE: 98 MMHG | SYSTOLIC BLOOD PRESSURE: 133 MMHG

## 2023-01-14 VITALS — SYSTOLIC BLOOD PRESSURE: 162 MMHG | DIASTOLIC BLOOD PRESSURE: 63 MMHG

## 2023-01-14 LAB
ALBUMIN SERPL-MCNC: 2.1 G/DL (ref 3.5–5)
ALT SERPL-CCNC: 17 U/L (ref 12–78)
AST SERPL-CCNC: 19 U/L (ref 10–37)
BUN SERPL-MCNC: 76 MG/DL (ref 7–18)
CHLORIDE SERPL-SCNC: 105 MMOL/L (ref 101–111)
CO2 SERPL-SCNC: 22 MMOL/L (ref 21–32)
CREAT SERPL-MCNC: 5.2 MG/DL (ref 0.5–1.5)
EOSINOPHIL NFR BLD MANUAL: 3 % (ref 1–6)
ERYTHROCYTE [DISTWIDTH] IN BLOOD BY AUTOMATED COUNT: 13.9 % (ref 11–15.5)
GFR SERPL CREATININE-BSD FRML MDRD: 9 ML/MIN (ref 60–?)
GLUCOSE SERPL-MCNC: 100 MG/DL (ref 70–105)
HCT VFR BLD AUTO: 29.8 % (ref 36–48)
LYMPHOCYTES NFR BLD MANUAL: 13 % (ref 22–44)
MANUAL DIF COMMENT BLD-IMP: (no result)
MCH RBC QN AUTO: 31.1 PG (ref 27–33)
MCHC RBC AUTO-ENTMCNC: 32.2 G/DL (ref 32–36)
MCV RBC AUTO: 96.4 FL (ref 79–99)
MONOCYTES NFR BLD MANUAL: 7 % (ref 2–9)
NEUTS BAND NFR BLD MANUAL: 1 % (ref 0–2)
NEUTS SEG NFR BLD MANUAL: 76 % (ref 40–70)
NRBC BLD MANUAL-RTO: 0.3 % (ref 0–0.19)
PLAT MORPH BLD: ADEQUATE
PLATELET # BLD AUTO: 148 K/UL (ref 130–400)
POTASSIUM SERPL-SCNC: 4.1 MMOL/L (ref 3.5–5.1)
PROT SERPL-MCNC: 6.5 G/DL (ref 6–8.3)
RBC # BLD AUTO: 3.09 MIL/UL (ref 4–5.5)
RBC MORPH BLD: (no result)
SODIUM SERPL-SCNC: 138 MMOL/L (ref 136–145)
WBC # BLD AUTO: 8 K/UL (ref 4.8–10.8)

## 2023-01-14 PROCEDURE — 5A1D70Z PERFORMANCE OF URINARY FILTRATION, INTERMITTENT, LESS THAN 6 HOURS PER DAY: ICD-10-PCS | Performed by: INTERNAL MEDICINE

## 2023-01-14 RX ADMIN — FAMOTIDINE SCH MG: 20 TABLET, FILM COATED ORAL at 21:26

## 2023-01-14 RX ADMIN — SODIUM CHLORIDE SCH MLS/HR: 9 INJECTION, SOLUTION INTRAVENOUS at 21:26

## 2023-01-14 RX ADMIN — ACETAMINOPHEN PRN MG: 325 TABLET, FILM COATED ORAL at 09:19

## 2023-01-14 RX ADMIN — ASCORBIC ACID, FOLIC ACID, NIACIN, THIAMINE, RIBOFLAVIN, PYRIDOXINE, CYANOCOBALAMIN, PANTOTHENIC ACID, BIOTIN SCH CAP: 100; 150; 6; 1; 20; 5; 10; 1.7; 1.5 CAPSULE, LIQUID FILLED ORAL at 09:19

## 2023-01-14 RX ADMIN — SODIUM CHLORIDE SCH UNIT: 9 INJECTION, SOLUTION INTRAVENOUS at 16:29

## 2023-01-14 RX ADMIN — PIPERACILLIN SODIUM AND TAZOBACTAM SODIUM SCH MLS/HR: .375; 3 INJECTION, POWDER, LYOPHILIZED, FOR SOLUTION INTRAVENOUS at 01:32

## 2023-01-14 RX ADMIN — HEPARIN SODIUM SCH UNIT: 5000 INJECTION, SOLUTION INTRAVENOUS; SUBCUTANEOUS at 09:20

## 2023-01-14 RX ADMIN — HEPARIN SODIUM PRN UNIT: 5000 INJECTION, SOLUTION INTRAVENOUS; SUBCUTANEOUS at 14:35

## 2023-01-14 RX ADMIN — FAMOTIDINE SCH MG: 20 TABLET, FILM COATED ORAL at 19:48

## 2023-01-14 RX ADMIN — SODIUM CHLORIDE SCH UNIT: 9 INJECTION, SOLUTION INTRAVENOUS at 11:30

## 2023-01-14 RX ADMIN — PIPERACILLIN SODIUM AND TAZOBACTAM SODIUM SCH MLS/HR: .375; 3 INJECTION, POWDER, LYOPHILIZED, FOR SOLUTION INTRAVENOUS at 21:26

## 2023-01-14 RX ADMIN — PIPERACILLIN SODIUM AND TAZOBACTAM SODIUM SCH MLS/HR: .375; 3 INJECTION, POWDER, LYOPHILIZED, FOR SOLUTION INTRAVENOUS at 09:19

## 2023-01-14 RX ADMIN — SODIUM CHLORIDE SCH UNIT: 9 INJECTION, SOLUTION INTRAVENOUS at 20:53

## 2023-01-14 RX ADMIN — SODIUM CHLORIDE SCH UNIT: 9 INJECTION, SOLUTION INTRAVENOUS at 06:34

## 2023-01-14 RX ADMIN — ACETAMINOPHEN PRN MG: 325 TABLET, FILM COATED ORAL at 21:44

## 2023-01-14 RX ADMIN — HEPARIN SODIUM SCH UNIT: 5000 INJECTION, SOLUTION INTRAVENOUS; SUBCUTANEOUS at 13:58

## 2023-01-14 RX ADMIN — FAMOTIDINE SCH MG: 10 INJECTION INTRAVENOUS at 20:54

## 2023-01-14 RX ADMIN — HEPARIN SODIUM SCH UNIT: 5000 INJECTION, SOLUTION INTRAVENOUS; SUBCUTANEOUS at 21:45

## 2023-01-15 VITALS — SYSTOLIC BLOOD PRESSURE: 155 MMHG | DIASTOLIC BLOOD PRESSURE: 65 MMHG

## 2023-01-15 VITALS — DIASTOLIC BLOOD PRESSURE: 51 MMHG | SYSTOLIC BLOOD PRESSURE: 138 MMHG

## 2023-01-15 VITALS — SYSTOLIC BLOOD PRESSURE: 129 MMHG | DIASTOLIC BLOOD PRESSURE: 58 MMHG

## 2023-01-15 VITALS — DIASTOLIC BLOOD PRESSURE: 51 MMHG | SYSTOLIC BLOOD PRESSURE: 130 MMHG

## 2023-01-15 VITALS — SYSTOLIC BLOOD PRESSURE: 132 MMHG | DIASTOLIC BLOOD PRESSURE: 49 MMHG

## 2023-01-15 VITALS — DIASTOLIC BLOOD PRESSURE: 44 MMHG | SYSTOLIC BLOOD PRESSURE: 116 MMHG

## 2023-01-15 VITALS — SYSTOLIC BLOOD PRESSURE: 117 MMHG | DIASTOLIC BLOOD PRESSURE: 63 MMHG

## 2023-01-15 VITALS — SYSTOLIC BLOOD PRESSURE: 147 MMHG | DIASTOLIC BLOOD PRESSURE: 56 MMHG

## 2023-01-15 LAB
BUN SERPL-MCNC: 59 MG/DL (ref 7–18)
CHLORIDE SERPL-SCNC: 103 MMOL/L (ref 101–111)
CO2 SERPL-SCNC: 25 MMOL/L (ref 21–32)
CREAT SERPL-MCNC: 5 MG/DL (ref 0.5–1.5)
EOSINOPHIL NFR BLD MANUAL: 4 % (ref 1–6)
ERYTHROCYTE [DISTWIDTH] IN BLOOD BY AUTOMATED COUNT: 14.1 % (ref 11–15.5)
GFR SERPL CREATININE-BSD FRML MDRD: 9 ML/MIN (ref 60–?)
GLUCOSE SERPL-MCNC: 121 MG/DL (ref 70–105)
HCT VFR BLD AUTO: 30.3 % (ref 36–48)
LYMPHOCYTES NFR BLD MANUAL: 18 % (ref 22–44)
MAGNESIUM SERPL-MCNC: 2.5 MG/DL (ref 1.8–2.4)
MANUAL DIF COMMENT BLD-IMP: (no result)
MCH RBC QN AUTO: 30.7 PG (ref 27–33)
MCHC RBC AUTO-ENTMCNC: 32 G/DL (ref 32–36)
MCV RBC AUTO: 95.9 FL (ref 79–99)
MONOCYTES NFR BLD MANUAL: 7 % (ref 2–9)
NEUTS SEG NFR BLD MANUAL: 71 % (ref 40–70)
NRBC BLD MANUAL-RTO: 0.4 % (ref 0–0.19)
PLAT MORPH BLD: ADEQUATE
PLATELET # BLD AUTO: 117 K/UL (ref 130–400)
POTASSIUM SERPL-SCNC: 3.4 MMOL/L (ref 3.5–5.1)
RBC # BLD AUTO: 3.16 MIL/UL (ref 4–5.5)
RBC MORPH BLD: (no result)
SODIUM SERPL-SCNC: 139 MMOL/L (ref 136–145)
WBC # BLD AUTO: 6.9 K/UL (ref 4.8–10.8)

## 2023-01-15 PROCEDURE — 5A09357 ASSISTANCE WITH RESPIRATORY VENTILATION, LESS THAN 24 CONSECUTIVE HOURS, CONTINUOUS POSITIVE AIRWAY PRESSURE: ICD-10-PCS | Performed by: INTERNAL MEDICINE

## 2023-01-15 RX ADMIN — HEPARIN SODIUM SCH UNIT: 5000 INJECTION, SOLUTION INTRAVENOUS; SUBCUTANEOUS at 09:11

## 2023-01-15 RX ADMIN — HEPARIN SODIUM SCH UNIT: 5000 INJECTION, SOLUTION INTRAVENOUS; SUBCUTANEOUS at 20:36

## 2023-01-15 RX ADMIN — SODIUM CHLORIDE SCH UNIT: 9 INJECTION, SOLUTION INTRAVENOUS at 20:12

## 2023-01-15 RX ADMIN — FAMOTIDINE SCH MG: 10 INJECTION INTRAVENOUS at 19:49

## 2023-01-15 RX ADMIN — AMLODIPINE BESYLATE SCH MG: 5 TABLET ORAL at 09:10

## 2023-01-15 RX ADMIN — SODIUM CHLORIDE SCH UNIT: 9 INJECTION, SOLUTION INTRAVENOUS at 06:19

## 2023-01-15 RX ADMIN — SODIUM CHLORIDE SCH UNIT: 9 INJECTION, SOLUTION INTRAVENOUS at 11:30

## 2023-01-15 RX ADMIN — ASCORBIC ACID, FOLIC ACID, NIACIN, THIAMINE, RIBOFLAVIN, PYRIDOXINE, CYANOCOBALAMIN, PANTOTHENIC ACID, BIOTIN SCH CAP: 100; 150; 6; 1; 20; 5; 10; 1.7; 1.5 CAPSULE, LIQUID FILLED ORAL at 09:10

## 2023-01-15 RX ADMIN — SODIUM CHLORIDE SCH UNIT: 9 INJECTION, SOLUTION INTRAVENOUS at 16:26

## 2023-01-15 RX ADMIN — PIPERACILLIN SODIUM AND TAZOBACTAM SODIUM SCH MLS/HR: .375; 3 INJECTION, POWDER, LYOPHILIZED, FOR SOLUTION INTRAVENOUS at 09:09

## 2023-01-15 RX ADMIN — PIPERACILLIN SODIUM AND TAZOBACTAM SODIUM SCH MLS/HR: .375; 3 INJECTION, POWDER, LYOPHILIZED, FOR SOLUTION INTRAVENOUS at 20:26

## 2023-01-15 RX ADMIN — ACETAMINOPHEN PRN MG: 325 TABLET, FILM COATED ORAL at 20:27

## 2023-01-15 RX ADMIN — FAMOTIDINE SCH MG: 20 TABLET, FILM COATED ORAL at 20:26

## 2023-01-15 RX ADMIN — HEPARIN SODIUM SCH UNIT: 5000 INJECTION, SOLUTION INTRAVENOUS; SUBCUTANEOUS at 13:58

## 2023-01-15 RX ADMIN — ACETAMINOPHEN PRN MG: 325 TABLET, FILM COATED ORAL at 09:10

## 2023-01-16 VITALS — DIASTOLIC BLOOD PRESSURE: 55 MMHG | SYSTOLIC BLOOD PRESSURE: 134 MMHG

## 2023-01-16 VITALS — SYSTOLIC BLOOD PRESSURE: 131 MMHG | DIASTOLIC BLOOD PRESSURE: 52 MMHG

## 2023-01-16 VITALS — DIASTOLIC BLOOD PRESSURE: 44 MMHG | SYSTOLIC BLOOD PRESSURE: 130 MMHG

## 2023-01-16 VITALS — SYSTOLIC BLOOD PRESSURE: 150 MMHG | DIASTOLIC BLOOD PRESSURE: 57 MMHG

## 2023-01-16 VITALS — SYSTOLIC BLOOD PRESSURE: 139 MMHG | DIASTOLIC BLOOD PRESSURE: 58 MMHG

## 2023-01-16 VITALS — SYSTOLIC BLOOD PRESSURE: 153 MMHG | DIASTOLIC BLOOD PRESSURE: 58 MMHG

## 2023-01-16 VITALS — SYSTOLIC BLOOD PRESSURE: 131 MMHG | DIASTOLIC BLOOD PRESSURE: 51 MMHG

## 2023-01-16 VITALS — SYSTOLIC BLOOD PRESSURE: 139 MMHG | DIASTOLIC BLOOD PRESSURE: 65 MMHG

## 2023-01-16 VITALS — SYSTOLIC BLOOD PRESSURE: 120 MMHG | DIASTOLIC BLOOD PRESSURE: 49 MMHG

## 2023-01-16 VITALS — DIASTOLIC BLOOD PRESSURE: 48 MMHG | SYSTOLIC BLOOD PRESSURE: 136 MMHG

## 2023-01-16 VITALS — DIASTOLIC BLOOD PRESSURE: 54 MMHG | SYSTOLIC BLOOD PRESSURE: 152 MMHG

## 2023-01-16 VITALS — SYSTOLIC BLOOD PRESSURE: 153 MMHG | DIASTOLIC BLOOD PRESSURE: 65 MMHG

## 2023-01-16 VITALS — DIASTOLIC BLOOD PRESSURE: 59 MMHG | SYSTOLIC BLOOD PRESSURE: 154 MMHG

## 2023-01-16 VITALS — SYSTOLIC BLOOD PRESSURE: 127 MMHG | DIASTOLIC BLOOD PRESSURE: 63 MMHG

## 2023-01-16 VITALS — DIASTOLIC BLOOD PRESSURE: 76 MMHG | SYSTOLIC BLOOD PRESSURE: 149 MMHG

## 2023-01-16 LAB
BASOPHILS NFR BLD AUTO: 0.4 % (ref 0–5)
BUN SERPL-MCNC: 65 MG/DL (ref 7–18)
CHLORIDE SERPL-SCNC: 106 MMOL/L (ref 101–111)
CO2 SERPL-SCNC: 24 MMOL/L (ref 21–32)
CREAT SERPL-MCNC: 5.5 MG/DL (ref 0.5–1.5)
EOSINOPHIL NFR BLD AUTO: 1.6 % (ref 0–8)
ERYTHROCYTE [DISTWIDTH] IN BLOOD BY AUTOMATED COUNT: 14.1 % (ref 11–15.5)
GFR SERPL CREATININE-BSD FRML MDRD: 8 ML/MIN (ref 60–?)
GLUCOSE SERPL-MCNC: 112 MG/DL (ref 70–105)
HCT VFR BLD AUTO: 29.7 % (ref 36–48)
LYMPHOCYTES NFR SPEC AUTO: 17.5 % (ref 21–51)
MAGNESIUM SERPL-MCNC: 2.4 MG/DL (ref 1.8–2.4)
MCH RBC QN AUTO: 31.4 PG (ref 27–33)
MCHC RBC AUTO-ENTMCNC: 32 G/DL (ref 32–36)
MCV RBC AUTO: 98 FL (ref 79–99)
MONOCYTES NFR BLD AUTO: 12.2 % (ref 3–13)
NEUTROPHILS NFR BLD AUTO: 66.8 % (ref 40–77)
NRBC BLD MANUAL-RTO: 0.4 % (ref 0–0.19)
PHOSPHATE SERPL-MCNC: 8 MG/DL (ref 2.5–4.9)
PLATELET # BLD AUTO: 103 K/UL (ref 130–400)
POTASSIUM SERPL-SCNC: 3.3 MMOL/L (ref 3.5–5.1)
RBC # BLD AUTO: 3.03 MIL/UL (ref 4–5.5)
SODIUM SERPL-SCNC: 142 MMOL/L (ref 136–145)
WBC # BLD AUTO: 7.4 K/UL (ref 4.8–10.8)

## 2023-01-16 PROCEDURE — 5A09357 ASSISTANCE WITH RESPIRATORY VENTILATION, LESS THAN 24 CONSECUTIVE HOURS, CONTINUOUS POSITIVE AIRWAY PRESSURE: ICD-10-PCS | Performed by: INTERNAL MEDICINE

## 2023-01-16 PROCEDURE — 5A1D70Z PERFORMANCE OF URINARY FILTRATION, INTERMITTENT, LESS THAN 6 HOURS PER DAY: ICD-10-PCS | Performed by: INTERNAL MEDICINE

## 2023-01-16 RX ADMIN — SODIUM CHLORIDE SCH UNIT: 9 INJECTION, SOLUTION INTRAVENOUS at 16:30

## 2023-01-16 RX ADMIN — PIPERACILLIN SODIUM AND TAZOBACTAM SODIUM SCH MLS/HR: .375; 3 INJECTION, POWDER, LYOPHILIZED, FOR SOLUTION INTRAVENOUS at 21:16

## 2023-01-16 RX ADMIN — PIPERACILLIN SODIUM AND TAZOBACTAM SODIUM SCH MLS/HR: .375; 3 INJECTION, POWDER, LYOPHILIZED, FOR SOLUTION INTRAVENOUS at 11:02

## 2023-01-16 RX ADMIN — HEPARIN SODIUM SCH UNIT: 5000 INJECTION, SOLUTION INTRAVENOUS; SUBCUTANEOUS at 11:03

## 2023-01-16 RX ADMIN — FAMOTIDINE SCH MG: 20 TABLET, FILM COATED ORAL at 21:16

## 2023-01-16 RX ADMIN — AMLODIPINE BESYLATE SCH MG: 5 TABLET ORAL at 09:00

## 2023-01-16 RX ADMIN — HEPARIN SODIUM SCH UNIT: 5000 INJECTION, SOLUTION INTRAVENOUS; SUBCUTANEOUS at 21:29

## 2023-01-16 RX ADMIN — SODIUM CHLORIDE SCH UNIT: 9 INJECTION, SOLUTION INTRAVENOUS at 11:30

## 2023-01-16 RX ADMIN — HEPARIN SODIUM SCH UNIT: 5000 INJECTION, SOLUTION INTRAVENOUS; SUBCUTANEOUS at 14:00

## 2023-01-16 RX ADMIN — ACETAMINOPHEN PRN MG: 325 TABLET, FILM COATED ORAL at 11:11

## 2023-01-16 RX ADMIN — SODIUM CHLORIDE SCH UNIT: 9 INJECTION, SOLUTION INTRAVENOUS at 06:28

## 2023-01-16 RX ADMIN — SODIUM CHLORIDE SCH UNIT: 9 INJECTION, SOLUTION INTRAVENOUS at 21:00

## 2023-01-16 RX ADMIN — ASCORBIC ACID, FOLIC ACID, NIACIN, THIAMINE, RIBOFLAVIN, PYRIDOXINE, CYANOCOBALAMIN, PANTOTHENIC ACID, BIOTIN SCH CAP: 100; 150; 6; 1; 20; 5; 10; 1.7; 1.5 CAPSULE, LIQUID FILLED ORAL at 11:02

## 2023-01-16 RX ADMIN — FAMOTIDINE SCH MG: 10 INJECTION INTRAVENOUS at 21:00

## 2023-01-17 VITALS — SYSTOLIC BLOOD PRESSURE: 146 MMHG | DIASTOLIC BLOOD PRESSURE: 61 MMHG

## 2023-01-17 VITALS — DIASTOLIC BLOOD PRESSURE: 62 MMHG | SYSTOLIC BLOOD PRESSURE: 147 MMHG

## 2023-01-17 VITALS — DIASTOLIC BLOOD PRESSURE: 40 MMHG | SYSTOLIC BLOOD PRESSURE: 147 MMHG

## 2023-01-17 VITALS — DIASTOLIC BLOOD PRESSURE: 44 MMHG | SYSTOLIC BLOOD PRESSURE: 142 MMHG

## 2023-01-17 VITALS — SYSTOLIC BLOOD PRESSURE: 160 MMHG | DIASTOLIC BLOOD PRESSURE: 50 MMHG

## 2023-01-17 VITALS — DIASTOLIC BLOOD PRESSURE: 60 MMHG | SYSTOLIC BLOOD PRESSURE: 168 MMHG

## 2023-01-17 VITALS — DIASTOLIC BLOOD PRESSURE: 43 MMHG | SYSTOLIC BLOOD PRESSURE: 81 MMHG

## 2023-01-17 VITALS — SYSTOLIC BLOOD PRESSURE: 168 MMHG | DIASTOLIC BLOOD PRESSURE: 63 MMHG

## 2023-01-17 VITALS — DIASTOLIC BLOOD PRESSURE: 64 MMHG | SYSTOLIC BLOOD PRESSURE: 158 MMHG

## 2023-01-17 VITALS — SYSTOLIC BLOOD PRESSURE: 125 MMHG | DIASTOLIC BLOOD PRESSURE: 52 MMHG

## 2023-01-17 VITALS — DIASTOLIC BLOOD PRESSURE: 74 MMHG | SYSTOLIC BLOOD PRESSURE: 98 MMHG

## 2023-01-17 VITALS — DIASTOLIC BLOOD PRESSURE: 67 MMHG | SYSTOLIC BLOOD PRESSURE: 136 MMHG

## 2023-01-17 VITALS — DIASTOLIC BLOOD PRESSURE: 50 MMHG | SYSTOLIC BLOOD PRESSURE: 141 MMHG

## 2023-01-17 VITALS — DIASTOLIC BLOOD PRESSURE: 93 MMHG | SYSTOLIC BLOOD PRESSURE: 152 MMHG

## 2023-01-17 VITALS — SYSTOLIC BLOOD PRESSURE: 143 MMHG | DIASTOLIC BLOOD PRESSURE: 46 MMHG

## 2023-01-17 VITALS — DIASTOLIC BLOOD PRESSURE: 60 MMHG | SYSTOLIC BLOOD PRESSURE: 158 MMHG

## 2023-01-17 VITALS — DIASTOLIC BLOOD PRESSURE: 50 MMHG | SYSTOLIC BLOOD PRESSURE: 88 MMHG

## 2023-01-17 VITALS — DIASTOLIC BLOOD PRESSURE: 46 MMHG | SYSTOLIC BLOOD PRESSURE: 132 MMHG

## 2023-01-17 VITALS — SYSTOLIC BLOOD PRESSURE: 158 MMHG | DIASTOLIC BLOOD PRESSURE: 57 MMHG

## 2023-01-17 VITALS — DIASTOLIC BLOOD PRESSURE: 64 MMHG | SYSTOLIC BLOOD PRESSURE: 112 MMHG

## 2023-01-17 VITALS — SYSTOLIC BLOOD PRESSURE: 149 MMHG | DIASTOLIC BLOOD PRESSURE: 67 MMHG

## 2023-01-17 VITALS — SYSTOLIC BLOOD PRESSURE: 162 MMHG | DIASTOLIC BLOOD PRESSURE: 59 MMHG

## 2023-01-17 VITALS — SYSTOLIC BLOOD PRESSURE: 136 MMHG | DIASTOLIC BLOOD PRESSURE: 54 MMHG

## 2023-01-17 LAB
BASOPHILS NFR BLD AUTO: 0.4 % (ref 0–5)
BUN SERPL-MCNC: 51 MG/DL (ref 7–18)
CHLORIDE SERPL-SCNC: 102 MMOL/L (ref 101–111)
CO2 SERPL-SCNC: 25 MMOL/L (ref 21–32)
CREAT SERPL-MCNC: 4.7 MG/DL (ref 0.5–1.5)
EOSINOPHIL NFR BLD AUTO: 2.7 % (ref 0–8)
ERYTHROCYTE [DISTWIDTH] IN BLOOD BY AUTOMATED COUNT: 14.1 % (ref 11–15.5)
GFR SERPL CREATININE-BSD FRML MDRD: 10 ML/MIN (ref 60–?)
GLUCOSE SERPL-MCNC: 121 MG/DL (ref 70–105)
HCT VFR BLD AUTO: 29.8 % (ref 36–48)
IRON SATN MFR SERPL: 19.2 % (ref 22–44)
IRON SERPL-MCNC: 36 MCG/DL (ref 50–170)
LYMPHOCYTES NFR SPEC AUTO: 15 % (ref 21–51)
MCH RBC QN AUTO: 30.8 PG (ref 27–33)
MCHC RBC AUTO-ENTMCNC: 32.6 G/DL (ref 32–36)
MCV RBC AUTO: 94.6 FL (ref 79–99)
MONOCYTES NFR BLD AUTO: 12.2 % (ref 3–13)
NEUTROPHILS NFR BLD AUTO: 68.5 % (ref 40–77)
NRBC BLD MANUAL-RTO: 0.5 % (ref 0–0.19)
PLATELET # BLD AUTO: 110 K/UL (ref 130–400)
POTASSIUM SERPL-SCNC: 3.2 MMOL/L (ref 3.5–5.1)
RBC # BLD AUTO: 3.15 MIL/UL (ref 4–5.5)
SODIUM SERPL-SCNC: 138 MMOL/L (ref 136–145)
TIBC SERPL-MCNC: 187 MCG/DL (ref 250–450)
TSH SERPL DL<=0.05 MIU/L-ACNC: 2.54 UIU/ML (ref 0.36–3.74)
WBC # BLD AUTO: 8.3 K/UL (ref 4.8–10.8)

## 2023-01-17 PROCEDURE — 02H633Z INSERTION OF INFUSION DEVICE INTO RIGHT ATRIUM, PERCUTANEOUS APPROACH: ICD-10-PCS | Performed by: RADIOLOGY

## 2023-01-17 PROCEDURE — 0JH63XZ INSERTION OF TUNNELED VASCULAR ACCESS DEVICE INTO CHEST SUBCUTANEOUS TISSUE AND FASCIA, PERCUTANEOUS APPROACH: ICD-10-PCS | Performed by: RADIOLOGY

## 2023-01-17 PROCEDURE — B5181ZA FLUOROSCOPY OF SUPERIOR VENA CAVA USING LOW OSMOLAR CONTRAST, GUIDANCE: ICD-10-PCS | Performed by: RADIOLOGY

## 2023-01-17 PROCEDURE — 5A1D70Z PERFORMANCE OF URINARY FILTRATION, INTERMITTENT, LESS THAN 6 HOURS PER DAY: ICD-10-PCS | Performed by: INTERNAL MEDICINE

## 2023-01-17 RX ADMIN — PIPERACILLIN SODIUM AND TAZOBACTAM SODIUM SCH MLS/HR: .375; 3 INJECTION, POWDER, LYOPHILIZED, FOR SOLUTION INTRAVENOUS at 21:28

## 2023-01-17 RX ADMIN — SODIUM CHLORIDE SCH UNIT: 9 INJECTION, SOLUTION INTRAVENOUS at 11:30

## 2023-01-17 RX ADMIN — SODIUM CHLORIDE SCH UNIT: 9 INJECTION, SOLUTION INTRAVENOUS at 21:00

## 2023-01-17 RX ADMIN — FAMOTIDINE SCH MG: 10 INJECTION INTRAVENOUS at 21:00

## 2023-01-17 RX ADMIN — FAMOTIDINE SCH MG: 20 TABLET, FILM COATED ORAL at 21:17

## 2023-01-17 RX ADMIN — ACETAMINOPHEN PRN MG: 325 TABLET, FILM COATED ORAL at 22:22

## 2023-01-17 RX ADMIN — SODIUM CHLORIDE SCH UNIT: 9 INJECTION, SOLUTION INTRAVENOUS at 06:26

## 2023-01-17 RX ADMIN — HEPARIN SODIUM PRN UNIT: 5000 INJECTION, SOLUTION INTRAVENOUS; SUBCUTANEOUS at 14:56

## 2023-01-17 RX ADMIN — POTASSIUM CHLORIDE PRN MEQ: 1500 TABLET, EXTENDED RELEASE ORAL at 17:49

## 2023-01-17 RX ADMIN — AMLODIPINE BESYLATE SCH MG: 5 TABLET ORAL at 09:00

## 2023-01-17 RX ADMIN — ACETAMINOPHEN PRN MG: 325 TABLET, FILM COATED ORAL at 09:34

## 2023-01-17 RX ADMIN — HEPARIN SODIUM SCH UNIT: 5000 INJECTION, SOLUTION INTRAVENOUS; SUBCUTANEOUS at 09:00

## 2023-01-17 RX ADMIN — POTASSIUM CHLORIDE PRN MEQ: 1500 TABLET, EXTENDED RELEASE ORAL at 14:53

## 2023-01-17 RX ADMIN — ASCORBIC ACID, FOLIC ACID, NIACIN, THIAMINE, RIBOFLAVIN, PYRIDOXINE, CYANOCOBALAMIN, PANTOTHENIC ACID, BIOTIN SCH CAP: 100; 150; 6; 1; 20; 5; 10; 1.7; 1.5 CAPSULE, LIQUID FILLED ORAL at 09:34

## 2023-01-17 RX ADMIN — HEPARIN SODIUM SCH UNIT: 5000 INJECTION, SOLUTION INTRAVENOUS; SUBCUTANEOUS at 21:23

## 2023-01-17 RX ADMIN — BISACODYL SCH MG: 5 TABLET, COATED ORAL at 21:00

## 2023-01-17 RX ADMIN — HEPARIN SODIUM SCH UNIT: 5000 INJECTION, SOLUTION INTRAVENOUS; SUBCUTANEOUS at 14:46

## 2023-01-17 RX ADMIN — PIPERACILLIN SODIUM AND TAZOBACTAM SODIUM SCH MLS/HR: .375; 3 INJECTION, POWDER, LYOPHILIZED, FOR SOLUTION INTRAVENOUS at 14:43

## 2023-01-17 RX ADMIN — SODIUM CHLORIDE SCH UNIT: 9 INJECTION, SOLUTION INTRAVENOUS at 16:30

## 2023-01-18 VITALS — DIASTOLIC BLOOD PRESSURE: 64 MMHG | SYSTOLIC BLOOD PRESSURE: 153 MMHG

## 2023-01-18 VITALS — DIASTOLIC BLOOD PRESSURE: 48 MMHG | SYSTOLIC BLOOD PRESSURE: 135 MMHG

## 2023-01-18 VITALS — SYSTOLIC BLOOD PRESSURE: 123 MMHG | DIASTOLIC BLOOD PRESSURE: 47 MMHG

## 2023-01-18 LAB
BASOPHILS NFR BLD AUTO: 0.2 % (ref 0–5)
BUN SERPL-MCNC: 45 MG/DL (ref 7–18)
CHLORIDE SERPL-SCNC: 99 MMOL/L (ref 101–111)
CO2 SERPL-SCNC: 26 MMOL/L (ref 21–32)
CREAT SERPL-MCNC: 4.3 MG/DL (ref 0.5–1.5)
EOSINOPHIL NFR BLD AUTO: 1.9 % (ref 0–8)
ERYTHROCYTE [DISTWIDTH] IN BLOOD BY AUTOMATED COUNT: 14.4 % (ref 11–15.5)
GFR SERPL CREATININE-BSD FRML MDRD: 11 ML/MIN (ref 60–?)
GLUCOSE SERPL-MCNC: 99 MG/DL (ref 70–105)
HCT VFR BLD AUTO: 30.1 % (ref 36–48)
LYMPHOCYTES NFR SPEC AUTO: 20.8 % (ref 21–51)
MCH RBC QN AUTO: 31 PG (ref 27–33)
MCHC RBC AUTO-ENTMCNC: 31.9 G/DL (ref 32–36)
MCV RBC AUTO: 97.1 FL (ref 79–99)
MONOCYTES NFR BLD AUTO: 11.4 % (ref 3–13)
NEUTROPHILS NFR BLD AUTO: 64.3 % (ref 40–77)
NRBC BLD MANUAL-RTO: 0.2 % (ref 0–0.19)
PLATELET # BLD AUTO: 113 K/UL (ref 130–400)
POTASSIUM SERPL-SCNC: 3.3 MMOL/L (ref 3.5–5.1)
RBC # BLD AUTO: 3.1 MIL/UL (ref 4–5.5)
SODIUM SERPL-SCNC: 136 MMOL/L (ref 136–145)
WBC # BLD AUTO: 8.9 K/UL (ref 4.8–10.8)

## 2023-01-18 RX ADMIN — ASCORBIC ACID, FOLIC ACID, NIACIN, THIAMINE, RIBOFLAVIN, PYRIDOXINE, CYANOCOBALAMIN, PANTOTHENIC ACID, BIOTIN SCH CAP: 100; 150; 6; 1; 20; 5; 10; 1.7; 1.5 CAPSULE, LIQUID FILLED ORAL at 09:20

## 2023-01-18 RX ADMIN — FAMOTIDINE SCH MG: 10 INJECTION INTRAVENOUS at 20:24

## 2023-01-18 RX ADMIN — POTASSIUM CHLORIDE PRN MEQ: 1500 TABLET, EXTENDED RELEASE ORAL at 13:35

## 2023-01-18 RX ADMIN — HEPARIN SODIUM SCH UNIT: 5000 INJECTION, SOLUTION INTRAVENOUS; SUBCUTANEOUS at 13:40

## 2023-01-18 RX ADMIN — FAMOTIDINE SCH MG: 20 TABLET, FILM COATED ORAL at 20:22

## 2023-01-18 RX ADMIN — PIPERACILLIN SODIUM AND TAZOBACTAM SODIUM SCH MLS/HR: .375; 3 INJECTION, POWDER, LYOPHILIZED, FOR SOLUTION INTRAVENOUS at 20:22

## 2023-01-18 RX ADMIN — BISACODYL SCH MG: 5 TABLET, COATED ORAL at 09:00

## 2023-01-18 RX ADMIN — SODIUM CHLORIDE SCH UNIT: 9 INJECTION, SOLUTION INTRAVENOUS at 11:30

## 2023-01-18 RX ADMIN — PIPERACILLIN SODIUM AND TAZOBACTAM SODIUM SCH MLS/HR: .375; 3 INJECTION, POWDER, LYOPHILIZED, FOR SOLUTION INTRAVENOUS at 09:23

## 2023-01-18 RX ADMIN — AMLODIPINE BESYLATE SCH MG: 5 TABLET ORAL at 09:22

## 2023-01-18 RX ADMIN — HEPARIN SODIUM SCH UNIT: 5000 INJECTION, SOLUTION INTRAVENOUS; SUBCUTANEOUS at 20:24

## 2023-01-18 RX ADMIN — BISACODYL SCH MG: 5 TABLET, COATED ORAL at 20:22

## 2023-01-18 RX ADMIN — SODIUM CHLORIDE SCH UNIT: 9 INJECTION, SOLUTION INTRAVENOUS at 06:36

## 2023-01-18 RX ADMIN — SODIUM CHLORIDE SCH UNIT: 9 INJECTION, SOLUTION INTRAVENOUS at 20:25

## 2023-01-18 RX ADMIN — HEPARIN SODIUM SCH UNIT: 5000 INJECTION, SOLUTION INTRAVENOUS; SUBCUTANEOUS at 10:48

## 2023-01-18 RX ADMIN — SODIUM CHLORIDE SCH UNIT: 9 INJECTION, SOLUTION INTRAVENOUS at 16:30

## 2023-01-18 RX ADMIN — BISACODYL SCH MG: 5 TABLET, COATED ORAL at 14:00

## 2023-01-18 RX ADMIN — POTASSIUM CHLORIDE PRN MEQ: 1500 TABLET, EXTENDED RELEASE ORAL at 09:21

## 2023-01-19 VITALS — DIASTOLIC BLOOD PRESSURE: 76 MMHG | SYSTOLIC BLOOD PRESSURE: 163 MMHG

## 2023-01-19 VITALS — DIASTOLIC BLOOD PRESSURE: 59 MMHG | SYSTOLIC BLOOD PRESSURE: 162 MMHG

## 2023-01-19 VITALS — DIASTOLIC BLOOD PRESSURE: 62 MMHG | SYSTOLIC BLOOD PRESSURE: 168 MMHG

## 2023-01-19 VITALS — DIASTOLIC BLOOD PRESSURE: 68 MMHG | SYSTOLIC BLOOD PRESSURE: 158 MMHG

## 2023-01-19 VITALS — DIASTOLIC BLOOD PRESSURE: 50 MMHG | SYSTOLIC BLOOD PRESSURE: 139 MMHG

## 2023-01-19 VITALS — SYSTOLIC BLOOD PRESSURE: 168 MMHG | DIASTOLIC BLOOD PRESSURE: 62 MMHG

## 2023-01-19 VITALS — SYSTOLIC BLOOD PRESSURE: 142 MMHG | DIASTOLIC BLOOD PRESSURE: 60 MMHG

## 2023-01-19 VITALS — DIASTOLIC BLOOD PRESSURE: 69 MMHG | SYSTOLIC BLOOD PRESSURE: 165 MMHG

## 2023-01-19 VITALS — DIASTOLIC BLOOD PRESSURE: 60 MMHG | SYSTOLIC BLOOD PRESSURE: 171 MMHG

## 2023-01-19 VITALS — DIASTOLIC BLOOD PRESSURE: 54 MMHG | SYSTOLIC BLOOD PRESSURE: 155 MMHG

## 2023-01-19 VITALS — SYSTOLIC BLOOD PRESSURE: 164 MMHG | DIASTOLIC BLOOD PRESSURE: 56 MMHG

## 2023-01-19 VITALS — DIASTOLIC BLOOD PRESSURE: 78 MMHG | SYSTOLIC BLOOD PRESSURE: 156 MMHG

## 2023-01-19 VITALS — SYSTOLIC BLOOD PRESSURE: 136 MMHG | DIASTOLIC BLOOD PRESSURE: 50 MMHG

## 2023-01-19 VITALS — DIASTOLIC BLOOD PRESSURE: 59 MMHG | SYSTOLIC BLOOD PRESSURE: 116 MMHG

## 2023-01-19 VITALS — DIASTOLIC BLOOD PRESSURE: 74 MMHG | SYSTOLIC BLOOD PRESSURE: 134 MMHG

## 2023-01-19 VITALS — DIASTOLIC BLOOD PRESSURE: 63 MMHG | SYSTOLIC BLOOD PRESSURE: 160 MMHG

## 2023-01-19 VITALS — SYSTOLIC BLOOD PRESSURE: 165 MMHG | DIASTOLIC BLOOD PRESSURE: 59 MMHG

## 2023-01-19 VITALS — DIASTOLIC BLOOD PRESSURE: 66 MMHG | SYSTOLIC BLOOD PRESSURE: 143 MMHG

## 2023-01-19 VITALS — SYSTOLIC BLOOD PRESSURE: 130 MMHG | DIASTOLIC BLOOD PRESSURE: 67 MMHG

## 2023-01-19 VITALS — SYSTOLIC BLOOD PRESSURE: 100 MMHG | DIASTOLIC BLOOD PRESSURE: 32 MMHG

## 2023-01-19 VITALS — SYSTOLIC BLOOD PRESSURE: 130 MMHG | DIASTOLIC BLOOD PRESSURE: 45 MMHG

## 2023-01-19 VITALS — DIASTOLIC BLOOD PRESSURE: 59 MMHG | SYSTOLIC BLOOD PRESSURE: 158 MMHG

## 2023-01-19 LAB
ALBUMIN SERPL-MCNC: 1.8 G/DL (ref 3.5–5)
ALT SERPL-CCNC: 15 U/L (ref 12–78)
AST SERPL-CCNC: 16 U/L (ref 10–37)
BUN SERPL-MCNC: 59 MG/DL (ref 7–18)
CHLORIDE SERPL-SCNC: 100 MMOL/L (ref 101–111)
CO2 SERPL-SCNC: 25 MMOL/L (ref 21–32)
CREAT SERPL-MCNC: 4.9 MG/DL (ref 0.5–1.5)
GFR SERPL CREATININE-BSD FRML MDRD: 9 ML/MIN (ref 60–?)
GLUCOSE SERPL-MCNC: 92 MG/DL (ref 70–105)
POTASSIUM SERPL-SCNC: 3.5 MMOL/L (ref 3.5–5.1)
PROT SERPL-MCNC: 6.3 G/DL (ref 6–8.3)
SODIUM SERPL-SCNC: 137 MMOL/L (ref 136–145)

## 2023-01-19 PROCEDURE — 5A1D70Z PERFORMANCE OF URINARY FILTRATION, INTERMITTENT, LESS THAN 6 HOURS PER DAY: ICD-10-PCS | Performed by: INTERNAL MEDICINE

## 2023-01-19 RX ADMIN — ACETAMINOPHEN PRN MG: 325 TABLET, FILM COATED ORAL at 13:28

## 2023-01-19 RX ADMIN — HEPARIN SODIUM PRN UNIT: 5000 INJECTION, SOLUTION INTRAVENOUS; SUBCUTANEOUS at 14:13

## 2023-01-19 RX ADMIN — EPOETIN ALFA-EPBX SCH UNIT: 10000 INJECTION, SOLUTION INTRAVENOUS; SUBCUTANEOUS at 16:58

## 2023-01-19 RX ADMIN — BISACODYL SCH MG: 5 TABLET, COATED ORAL at 09:00

## 2023-01-19 RX ADMIN — FAMOTIDINE SCH MG: 20 TABLET, FILM COATED ORAL at 20:42

## 2023-01-19 RX ADMIN — HEPARIN SODIUM SCH UNIT: 5000 INJECTION, SOLUTION INTRAVENOUS; SUBCUTANEOUS at 20:44

## 2023-01-19 RX ADMIN — PIPERACILLIN SODIUM AND TAZOBACTAM SODIUM SCH MLS/HR: .375; 3 INJECTION, POWDER, LYOPHILIZED, FOR SOLUTION INTRAVENOUS at 20:44

## 2023-01-19 RX ADMIN — ASCORBIC ACID, FOLIC ACID, NIACIN, THIAMINE, RIBOFLAVIN, PYRIDOXINE, CYANOCOBALAMIN, PANTOTHENIC ACID, BIOTIN SCH CAP: 100; 150; 6; 1; 20; 5; 10; 1.7; 1.5 CAPSULE, LIQUID FILLED ORAL at 09:44

## 2023-01-19 RX ADMIN — SODIUM CHLORIDE SCH UNIT: 9 INJECTION, SOLUTION INTRAVENOUS at 05:30

## 2023-01-19 RX ADMIN — SODIUM CHLORIDE SCH UNIT: 9 INJECTION, SOLUTION INTRAVENOUS at 11:30

## 2023-01-19 RX ADMIN — SODIUM CHLORIDE SCH UNIT: 9 INJECTION, SOLUTION INTRAVENOUS at 20:44

## 2023-01-19 RX ADMIN — BISACODYL SCH MG: 5 TABLET, COATED ORAL at 20:44

## 2023-01-19 RX ADMIN — SODIUM CHLORIDE SCH UNIT: 9 INJECTION, SOLUTION INTRAVENOUS at 16:30

## 2023-01-19 RX ADMIN — HEPARIN SODIUM SCH UNIT: 5000 INJECTION, SOLUTION INTRAVENOUS; SUBCUTANEOUS at 09:46

## 2023-01-19 RX ADMIN — HEPARIN SODIUM SCH UNIT: 5000 INJECTION, SOLUTION INTRAVENOUS; SUBCUTANEOUS at 14:06

## 2023-01-19 RX ADMIN — PIPERACILLIN SODIUM AND TAZOBACTAM SODIUM SCH MLS/HR: .375; 3 INJECTION, POWDER, LYOPHILIZED, FOR SOLUTION INTRAVENOUS at 09:44

## 2023-01-19 RX ADMIN — AMLODIPINE BESYLATE SCH MG: 5 TABLET ORAL at 09:00

## 2023-01-19 RX ADMIN — BISACODYL SCH MG: 5 TABLET, COATED ORAL at 14:00

## 2023-01-19 RX ADMIN — FAMOTIDINE SCH MG: 10 INJECTION INTRAVENOUS at 20:45

## 2023-01-20 VITALS — SYSTOLIC BLOOD PRESSURE: 167 MMHG | DIASTOLIC BLOOD PRESSURE: 78 MMHG

## 2023-01-20 VITALS — DIASTOLIC BLOOD PRESSURE: 64 MMHG | SYSTOLIC BLOOD PRESSURE: 140 MMHG

## 2023-01-20 VITALS — SYSTOLIC BLOOD PRESSURE: 165 MMHG | DIASTOLIC BLOOD PRESSURE: 75 MMHG

## 2023-01-20 VITALS — SYSTOLIC BLOOD PRESSURE: 133 MMHG | DIASTOLIC BLOOD PRESSURE: 54 MMHG

## 2023-01-20 VITALS — SYSTOLIC BLOOD PRESSURE: 149 MMHG | DIASTOLIC BLOOD PRESSURE: 75 MMHG

## 2023-01-20 VITALS — SYSTOLIC BLOOD PRESSURE: 112 MMHG | DIASTOLIC BLOOD PRESSURE: 70 MMHG

## 2023-01-20 LAB
APTT PPP: 46 SEC (ref 26.3–35.5)
BUN SERPL-MCNC: 42 MG/DL (ref 7–18)
CHLORIDE SERPL-SCNC: 97 MMOL/L (ref 101–111)
CO2 SERPL-SCNC: 29 MMOL/L (ref 21–32)
CREAT SERPL-MCNC: 4.2 MG/DL (ref 0.5–1.5)
ERYTHROCYTE [DISTWIDTH] IN BLOOD BY AUTOMATED COUNT: 14.4 % (ref 11–15.5)
GFR SERPL CREATININE-BSD FRML MDRD: 11 ML/MIN (ref 60–?)
GLUCOSE SERPL-MCNC: 135 MG/DL (ref 70–105)
HCT VFR BLD AUTO: 29.3 % (ref 36–48)
INR PPP: 0.98 (ref 0.85–1.15)
MCH RBC QN AUTO: 31.4 PG (ref 27–33)
MCHC RBC AUTO-ENTMCNC: 32.4 G/DL (ref 32–36)
MCV RBC AUTO: 96.7 FL (ref 79–99)
NRBC BLD MANUAL-RTO: 0.4 % (ref 0–0.19)
PLATELET # BLD AUTO: 146 K/UL (ref 130–400)
POTASSIUM SERPL-SCNC: 3.2 MMOL/L (ref 3.5–5.1)
PROTHROMBIN TIME: 10.7 SEC (ref 9.6–11.6)
RBC # BLD AUTO: 3.03 MIL/UL (ref 4–5.5)
SODIUM SERPL-SCNC: 134 MMOL/L (ref 136–145)
WBC # BLD AUTO: 8.3 K/UL (ref 4.8–10.8)

## 2023-01-20 RX ADMIN — BISACODYL SCH MG: 5 TABLET, COATED ORAL at 10:25

## 2023-01-20 RX ADMIN — PIPERACILLIN SODIUM AND TAZOBACTAM SODIUM SCH MLS/HR: .375; 3 INJECTION, POWDER, LYOPHILIZED, FOR SOLUTION INTRAVENOUS at 10:25

## 2023-01-20 RX ADMIN — AMLODIPINE BESYLATE SCH MG: 5 TABLET ORAL at 10:26

## 2023-01-20 RX ADMIN — HEPARIN SODIUM SCH UNIT: 5000 INJECTION, SOLUTION INTRAVENOUS; SUBCUTANEOUS at 14:00

## 2023-01-20 RX ADMIN — SODIUM CHLORIDE SCH UNIT: 9 INJECTION, SOLUTION INTRAVENOUS at 16:30

## 2023-01-20 RX ADMIN — SODIUM CHLORIDE SCH UNIT: 9 INJECTION, SOLUTION INTRAVENOUS at 21:00

## 2023-01-20 RX ADMIN — FAMOTIDINE SCH MG: 20 TABLET, FILM COATED ORAL at 20:38

## 2023-01-20 RX ADMIN — SODIUM CHLORIDE SCH UNIT: 9 INJECTION, SOLUTION INTRAVENOUS at 05:37

## 2023-01-20 RX ADMIN — HEPARIN SODIUM SCH UNIT: 5000 INJECTION, SOLUTION INTRAVENOUS; SUBCUTANEOUS at 10:27

## 2023-01-20 RX ADMIN — PIPERACILLIN SODIUM AND TAZOBACTAM SODIUM SCH MLS/HR: .375; 3 INJECTION, POWDER, LYOPHILIZED, FOR SOLUTION INTRAVENOUS at 20:34

## 2023-01-20 RX ADMIN — ASCORBIC ACID, FOLIC ACID, NIACIN, THIAMINE, RIBOFLAVIN, PYRIDOXINE, CYANOCOBALAMIN, PANTOTHENIC ACID, BIOTIN SCH CAP: 100; 150; 6; 1; 20; 5; 10; 1.7; 1.5 CAPSULE, LIQUID FILLED ORAL at 10:25

## 2023-01-20 RX ADMIN — FAMOTIDINE SCH MG: 10 INJECTION INTRAVENOUS at 20:34

## 2023-01-20 RX ADMIN — SODIUM CHLORIDE SCH UNIT: 9 INJECTION, SOLUTION INTRAVENOUS at 11:30

## 2023-01-20 RX ADMIN — BISACODYL SCH MG: 5 TABLET, COATED ORAL at 12:19

## 2023-01-20 RX ADMIN — BISACODYL SCH MG: 5 TABLET, COATED ORAL at 20:30

## 2023-01-20 RX ADMIN — HEPARIN SODIUM SCH UNIT: 5000 INJECTION, SOLUTION INTRAVENOUS; SUBCUTANEOUS at 20:35

## 2023-01-21 VITALS — DIASTOLIC BLOOD PRESSURE: 64 MMHG | SYSTOLIC BLOOD PRESSURE: 127 MMHG

## 2023-01-21 VITALS — SYSTOLIC BLOOD PRESSURE: 137 MMHG | DIASTOLIC BLOOD PRESSURE: 60 MMHG

## 2023-01-21 VITALS — DIASTOLIC BLOOD PRESSURE: 67 MMHG | SYSTOLIC BLOOD PRESSURE: 158 MMHG

## 2023-01-21 VITALS — DIASTOLIC BLOOD PRESSURE: 65 MMHG | SYSTOLIC BLOOD PRESSURE: 170 MMHG

## 2023-01-21 VITALS — DIASTOLIC BLOOD PRESSURE: 57 MMHG | SYSTOLIC BLOOD PRESSURE: 139 MMHG

## 2023-01-21 VITALS — SYSTOLIC BLOOD PRESSURE: 166 MMHG | DIASTOLIC BLOOD PRESSURE: 62 MMHG

## 2023-01-21 VITALS — DIASTOLIC BLOOD PRESSURE: 60 MMHG | SYSTOLIC BLOOD PRESSURE: 147 MMHG

## 2023-01-21 VITALS — SYSTOLIC BLOOD PRESSURE: 145 MMHG | DIASTOLIC BLOOD PRESSURE: 94 MMHG

## 2023-01-21 VITALS — SYSTOLIC BLOOD PRESSURE: 204 MMHG | DIASTOLIC BLOOD PRESSURE: 64 MMHG

## 2023-01-21 VITALS — SYSTOLIC BLOOD PRESSURE: 161 MMHG | DIASTOLIC BLOOD PRESSURE: 64 MMHG

## 2023-01-21 VITALS — SYSTOLIC BLOOD PRESSURE: 176 MMHG | DIASTOLIC BLOOD PRESSURE: 62 MMHG

## 2023-01-21 VITALS — SYSTOLIC BLOOD PRESSURE: 177 MMHG | DIASTOLIC BLOOD PRESSURE: 59 MMHG

## 2023-01-21 VITALS — SYSTOLIC BLOOD PRESSURE: 177 MMHG | DIASTOLIC BLOOD PRESSURE: 61 MMHG

## 2023-01-21 VITALS — DIASTOLIC BLOOD PRESSURE: 61 MMHG | SYSTOLIC BLOOD PRESSURE: 176 MMHG

## 2023-01-21 VITALS — DIASTOLIC BLOOD PRESSURE: 56 MMHG | SYSTOLIC BLOOD PRESSURE: 138 MMHG

## 2023-01-21 VITALS — SYSTOLIC BLOOD PRESSURE: 178 MMHG | DIASTOLIC BLOOD PRESSURE: 67 MMHG

## 2023-01-21 VITALS — SYSTOLIC BLOOD PRESSURE: 173 MMHG | DIASTOLIC BLOOD PRESSURE: 66 MMHG

## 2023-01-21 VITALS — SYSTOLIC BLOOD PRESSURE: 156 MMHG | DIASTOLIC BLOOD PRESSURE: 70 MMHG

## 2023-01-21 VITALS — SYSTOLIC BLOOD PRESSURE: 153 MMHG | DIASTOLIC BLOOD PRESSURE: 57 MMHG

## 2023-01-21 VITALS — DIASTOLIC BLOOD PRESSURE: 61 MMHG | SYSTOLIC BLOOD PRESSURE: 166 MMHG

## 2023-01-21 LAB
BUN SERPL-MCNC: 58 MG/DL (ref 7–18)
CHLORIDE SERPL-SCNC: 98 MMOL/L (ref 101–111)
CO2 SERPL-SCNC: 24 MMOL/L (ref 21–32)
CREAT SERPL-MCNC: 5.7 MG/DL (ref 0.5–1.5)
EOSINOPHIL NFR BLD MANUAL: 3 % (ref 1–6)
ERYTHROCYTE [DISTWIDTH] IN BLOOD BY AUTOMATED COUNT: 14.7 % (ref 11–15.5)
GFR SERPL CREATININE-BSD FRML MDRD: 8 ML/MIN (ref 60–?)
GLUCOSE SERPL-MCNC: 134 MG/DL (ref 70–105)
HCT VFR BLD AUTO: 30.3 % (ref 36–48)
LYMPHOCYTES NFR BLD MANUAL: 31 % (ref 22–44)
MANUAL DIF COMMENT BLD-IMP: (no result)
MCH RBC QN AUTO: 31.3 PG (ref 27–33)
MCHC RBC AUTO-ENTMCNC: 31.4 G/DL (ref 32–36)
MCV RBC AUTO: 99.7 FL (ref 79–99)
MONOCYTES NFR BLD MANUAL: 9 % (ref 2–9)
NEUTS SEG NFR BLD MANUAL: 57 % (ref 40–70)
NRBC BLD MANUAL-RTO: 0.3 % (ref 0–0.19)
PLAT MORPH BLD: ADEQUATE
PLATELET # BLD AUTO: 167 K/UL (ref 130–400)
POTASSIUM SERPL-SCNC: 3.1 MMOL/L (ref 3.5–5.1)
RBC # BLD AUTO: 3.04 MIL/UL (ref 4–5.5)
RBC MORPH BLD: (no result)
SODIUM SERPL-SCNC: 136 MMOL/L (ref 136–145)
WBC # BLD AUTO: 7.3 K/UL (ref 4.8–10.8)

## 2023-01-21 RX ADMIN — BISACODYL SCH MG: 5 TABLET, COATED ORAL at 20:27

## 2023-01-21 RX ADMIN — BISACODYL SCH MG: 5 TABLET, COATED ORAL at 09:00

## 2023-01-21 RX ADMIN — SODIUM CHLORIDE SCH UNIT: 9 INJECTION, SOLUTION INTRAVENOUS at 20:28

## 2023-01-21 RX ADMIN — BISACODYL SCH MG: 5 TABLET, COATED ORAL at 14:00

## 2023-01-21 RX ADMIN — SODIUM CHLORIDE SCH UNIT: 9 INJECTION, SOLUTION INTRAVENOUS at 07:11

## 2023-01-21 RX ADMIN — HEPARIN SODIUM SCH UNIT: 5000 INJECTION, SOLUTION INTRAVENOUS; SUBCUTANEOUS at 09:45

## 2023-01-21 RX ADMIN — POTASSIUM CHLORIDE PRN MEQ: 1500 TABLET, EXTENDED RELEASE ORAL at 21:43

## 2023-01-21 RX ADMIN — AMLODIPINE BESYLATE SCH MG: 5 TABLET ORAL at 09:00

## 2023-01-21 RX ADMIN — HEPARIN SODIUM PRN UNIT: 5000 INJECTION, SOLUTION INTRAVENOUS; SUBCUTANEOUS at 18:47

## 2023-01-21 RX ADMIN — ASCORBIC ACID, FOLIC ACID, NIACIN, THIAMINE, RIBOFLAVIN, PYRIDOXINE, CYANOCOBALAMIN, PANTOTHENIC ACID, BIOTIN SCH CAP: 100; 150; 6; 1; 20; 5; 10; 1.7; 1.5 CAPSULE, LIQUID FILLED ORAL at 09:41

## 2023-01-21 RX ADMIN — EPOETIN ALFA-EPBX SCH UNIT: 10000 INJECTION, SOLUTION INTRAVENOUS; SUBCUTANEOUS at 16:56

## 2023-01-21 RX ADMIN — HEPARIN SODIUM SCH UNIT: 5000 INJECTION, SOLUTION INTRAVENOUS; SUBCUTANEOUS at 20:28

## 2023-01-21 RX ADMIN — SODIUM CHLORIDE SCH UNIT: 9 INJECTION, SOLUTION INTRAVENOUS at 16:30

## 2023-01-21 RX ADMIN — SODIUM CHLORIDE SCH UNIT: 9 INJECTION, SOLUTION INTRAVENOUS at 11:30

## 2023-01-21 RX ADMIN — HEPARIN SODIUM SCH UNIT: 5000 INJECTION, SOLUTION INTRAVENOUS; SUBCUTANEOUS at 16:55

## 2023-01-21 RX ADMIN — FAMOTIDINE SCH MG: 20 TABLET, FILM COATED ORAL at 20:27

## 2023-01-21 RX ADMIN — PIPERACILLIN SODIUM AND TAZOBACTAM SODIUM SCH MLS/HR: .375; 3 INJECTION, POWDER, LYOPHILIZED, FOR SOLUTION INTRAVENOUS at 09:41

## 2023-05-13 ENCOUNTER — HOSPITAL ENCOUNTER (EMERGENCY)
Dept: HOSPITAL 90 - EDH | Age: 73
Discharge: HOME | End: 2023-05-13
Payer: COMMERCIAL

## 2023-05-13 VITALS — SYSTOLIC BLOOD PRESSURE: 145 MMHG | DIASTOLIC BLOOD PRESSURE: 45 MMHG

## 2023-05-13 VITALS — HEIGHT: 62 IN | BODY MASS INDEX: 37.73 KG/M2 | WEIGHT: 205.03 LBS

## 2023-05-13 DIAGNOSIS — I10: ICD-10-CM

## 2023-05-13 DIAGNOSIS — Z98.890: ICD-10-CM
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
## 2023-05-13 DIAGNOSIS — Z79.82: ICD-10-CM

## 2023-05-13 DIAGNOSIS — S60.222A: Primary | ICD-10-CM

## 2023-05-13 DIAGNOSIS — Z79.899: ICD-10-CM
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
## 2023-05-13 DIAGNOSIS — E78.00: ICD-10-CM
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
## 2023-05-13 DIAGNOSIS — E03.9: ICD-10-CM

## 2023-05-13 DIAGNOSIS — Z90.49: ICD-10-CM

## 2023-05-13 DIAGNOSIS — E11.9: ICD-10-CM

## 2023-05-13 PROCEDURE — 73130 X-RAY EXAM OF HAND: CPT

## 2023-05-31 ENCOUNTER — HOSPITAL ENCOUNTER (OUTPATIENT)
Dept: HOSPITAL 90 - DAH | Age: 73
Discharge: HOME | End: 2023-05-31
Attending: THORACIC SURGERY (CARDIOTHORACIC VASCULAR SURGERY)
Payer: COMMERCIAL

## 2023-05-31 VITALS — DIASTOLIC BLOOD PRESSURE: 41 MMHG | SYSTOLIC BLOOD PRESSURE: 119 MMHG

## 2023-05-31 VITALS — DIASTOLIC BLOOD PRESSURE: 53 MMHG | SYSTOLIC BLOOD PRESSURE: 143 MMHG

## 2023-05-31 VITALS — SYSTOLIC BLOOD PRESSURE: 129 MMHG | DIASTOLIC BLOOD PRESSURE: 46 MMHG

## 2023-05-31 VITALS — SYSTOLIC BLOOD PRESSURE: 118 MMHG | DIASTOLIC BLOOD PRESSURE: 41 MMHG

## 2023-05-31 VITALS — DIASTOLIC BLOOD PRESSURE: 42 MMHG | SYSTOLIC BLOOD PRESSURE: 121 MMHG

## 2023-05-31 VITALS — DIASTOLIC BLOOD PRESSURE: 49 MMHG | SYSTOLIC BLOOD PRESSURE: 127 MMHG

## 2023-05-31 VITALS — DIASTOLIC BLOOD PRESSURE: 45 MMHG | SYSTOLIC BLOOD PRESSURE: 115 MMHG

## 2023-05-31 VITALS — DIASTOLIC BLOOD PRESSURE: 48 MMHG | SYSTOLIC BLOOD PRESSURE: 140 MMHG

## 2023-05-31 VITALS — SYSTOLIC BLOOD PRESSURE: 184 MMHG | DIASTOLIC BLOOD PRESSURE: 90 MMHG

## 2023-05-31 VITALS — SYSTOLIC BLOOD PRESSURE: 132 MMHG | DIASTOLIC BLOOD PRESSURE: 47 MMHG

## 2023-05-31 VITALS — DIASTOLIC BLOOD PRESSURE: 46 MMHG | SYSTOLIC BLOOD PRESSURE: 123 MMHG

## 2023-05-31 VITALS — BODY MASS INDEX: 40.48 KG/M2 | WEIGHT: 214.4 LBS | HEIGHT: 61 IN

## 2023-05-31 VITALS — DIASTOLIC BLOOD PRESSURE: 48 MMHG | SYSTOLIC BLOOD PRESSURE: 135 MMHG

## 2023-05-31 VITALS — DIASTOLIC BLOOD PRESSURE: 50 MMHG | SYSTOLIC BLOOD PRESSURE: 116 MMHG

## 2023-05-31 VITALS — SYSTOLIC BLOOD PRESSURE: 129 MMHG | DIASTOLIC BLOOD PRESSURE: 52 MMHG

## 2023-05-31 VITALS — SYSTOLIC BLOOD PRESSURE: 132 MMHG | DIASTOLIC BLOOD PRESSURE: 46 MMHG

## 2023-05-31 VITALS — DIASTOLIC BLOOD PRESSURE: 36 MMHG | SYSTOLIC BLOOD PRESSURE: 118 MMHG

## 2023-05-31 VITALS — DIASTOLIC BLOOD PRESSURE: 45 MMHG | SYSTOLIC BLOOD PRESSURE: 120 MMHG

## 2023-05-31 DIAGNOSIS — E03.9: ICD-10-CM

## 2023-05-31 DIAGNOSIS — E11.22: Primary | ICD-10-CM

## 2023-05-31 DIAGNOSIS — Z20.822: ICD-10-CM

## 2023-05-31 DIAGNOSIS — I12.0: ICD-10-CM

## 2023-05-31 DIAGNOSIS — E78.5: ICD-10-CM

## 2023-05-31 DIAGNOSIS — Z90.49: ICD-10-CM

## 2023-05-31 DIAGNOSIS — Z79.82: ICD-10-CM

## 2023-05-31 DIAGNOSIS — N18.6: ICD-10-CM

## 2023-05-31 DIAGNOSIS — E66.01: ICD-10-CM

## 2023-05-31 DIAGNOSIS — Z79.899: ICD-10-CM

## 2023-05-31 DIAGNOSIS — I49.1: ICD-10-CM

## 2023-05-31 DIAGNOSIS — Z99.2: ICD-10-CM

## 2023-05-31 DIAGNOSIS — Z98.890: ICD-10-CM

## 2023-05-31 DIAGNOSIS — Z79.01: ICD-10-CM

## 2023-05-31 LAB
APTT PPP: 29.2 SEC (ref 26.3–35.5)
BUN SERPL-MCNC: 29 MG/DL (ref 7–18)
CHLORIDE SERPL-SCNC: 101 MMOL/L (ref 101–111)
CO2 SERPL-SCNC: 31 MMOL/L (ref 21–32)
CREAT SERPL-MCNC: 3.3 MG/DL (ref 0.5–1.5)
ERYTHROCYTE [DISTWIDTH] IN BLOOD BY AUTOMATED COUNT: 12.9 % (ref 11–15.5)
GFR SERPL CREATININE-BSD FRML MDRD: 14 ML/MIN (ref 90–?)
GLUCOSE SERPL-MCNC: 105 MG/DL (ref 70–105)
HCT VFR BLD AUTO: 31.9 % (ref 36–48)
INR PPP: 0.98 (ref 0.85–1.15)
MCH RBC QN AUTO: 33.1 PG (ref 27–33)
MCHC RBC AUTO-ENTMCNC: 32.6 G/DL (ref 32–36)
MCV RBC AUTO: 101.6 FL (ref 79–99)
NRBC BLD MANUAL-RTO: 0 % (ref 0–0.19)
PLATELET # BLD AUTO: 155 K/UL (ref 130–400)
POTASSIUM SERPL-SCNC: 4.9 MMOL/L (ref 3.5–5.1)
PROTHROMBIN TIME: 10.7 SEC (ref 9.6–11.6)
RBC # BLD AUTO: 3.14 MIL/UL (ref 4–5.5)
SODIUM SERPL-SCNC: 138 MMOL/L (ref 136–145)
WBC # BLD AUTO: 7.2 K/UL (ref 4.8–10.8)

## 2023-05-31 PROCEDURE — 85027 COMPLETE CBC AUTOMATED: CPT

## 2023-05-31 PROCEDURE — 36821 AV FUSION DIRECT ANY SITE: CPT

## 2023-05-31 PROCEDURE — 86900 BLOOD TYPING SEROLOGIC ABO: CPT

## 2023-05-31 PROCEDURE — 85610 PROTHROMBIN TIME: CPT

## 2023-05-31 PROCEDURE — 85730 THROMBOPLASTIN TIME PARTIAL: CPT

## 2023-05-31 PROCEDURE — 82948 REAGENT STRIP/BLOOD GLUCOSE: CPT

## 2023-05-31 PROCEDURE — 71045 X-RAY EXAM CHEST 1 VIEW: CPT

## 2023-05-31 PROCEDURE — 86850 RBC ANTIBODY SCREEN: CPT

## 2023-05-31 PROCEDURE — 93005 ELECTROCARDIOGRAM TRACING: CPT

## 2023-05-31 PROCEDURE — 80048 BASIC METABOLIC PNL TOTAL CA: CPT

## 2023-05-31 PROCEDURE — 86901 BLOOD TYPING SEROLOGIC RH(D): CPT

## 2023-05-31 PROCEDURE — 87426 SARSCOV CORONAVIRUS AG IA: CPT

## 2023-05-31 PROCEDURE — 36415 COLL VENOUS BLD VENIPUNCTURE: CPT

## 2024-12-07 ENCOUNTER — HOSPITAL ENCOUNTER (EMERGENCY)
Dept: HOSPITAL 90 - EDH | Age: 74
Discharge: HOME | End: 2024-12-07
Payer: COMMERCIAL

## 2024-12-07 VITALS
HEART RATE: 65 BPM | DIASTOLIC BLOOD PRESSURE: 47 MMHG | OXYGEN SATURATION: 97 % | RESPIRATION RATE: 20 BRPM | SYSTOLIC BLOOD PRESSURE: 120 MMHG

## 2024-12-07 VITALS — BODY MASS INDEX: 43.25 KG/M2 | WEIGHT: 235.01 LBS | HEIGHT: 62 IN

## 2024-12-07 VITALS — TEMPERATURE: 99.4 F

## 2024-12-07 VITALS — TEMPERATURE: 99 F

## 2024-12-07 DIAGNOSIS — D63.1: ICD-10-CM

## 2024-12-07 DIAGNOSIS — N18.6: ICD-10-CM

## 2024-12-07 DIAGNOSIS — Z20.822: ICD-10-CM

## 2024-12-07 DIAGNOSIS — Z79.82: ICD-10-CM

## 2024-12-07 DIAGNOSIS — E03.9: ICD-10-CM

## 2024-12-07 DIAGNOSIS — E11.22: ICD-10-CM

## 2024-12-07 DIAGNOSIS — B34.9: Primary | ICD-10-CM

## 2024-12-07 DIAGNOSIS — Z79.84: ICD-10-CM

## 2024-12-07 DIAGNOSIS — Z79.899: ICD-10-CM

## 2024-12-07 DIAGNOSIS — Z99.2: ICD-10-CM

## 2024-12-07 DIAGNOSIS — Z79.890: ICD-10-CM

## 2024-12-07 DIAGNOSIS — I12.0: ICD-10-CM

## 2024-12-07 LAB
BASOPHILS # BLD AUTO: 0.03 K/UL (ref 0–0.2)
BASOPHILS NFR BLD AUTO: 0.4 % (ref 0–5)
BUN SERPL-MCNC: 31 MG/DL (ref 7–18)
CHLORIDE SERPL-SCNC: 102 MMOL/L (ref 101–111)
CO2 SERPL-SCNC: 31 MMOL/L (ref 21–32)
CREAT SERPL-MCNC: 3.4 MG/DL (ref 0.5–1)
EOSINOPHIL # BLD AUTO: 0.09 K/UL (ref 0–0.7)
EOSINOPHIL NFR BLD AUTO: 1.1 % (ref 0–8)
ERYTHROCYTE [DISTWIDTH] IN BLOOD BY AUTOMATED COUNT: 12.4 % (ref 11–15.5)
GFR SERPL CREATININE-BSD FRML MDRD: 14 ML/MIN (ref 90–?)
GLUCOSE SERPL-MCNC: 141 MG/DL (ref 70–105)
HCT VFR BLD AUTO: 33.8 % (ref 36–48)
IMM GRANULOCYTES # BLD: 0.04 K/UL (ref 0–1)
LYMPHOCYTES # SPEC AUTO: 0.5 K/UL (ref 1–4.8)
LYMPHOCYTES NFR SPEC AUTO: 5.3 % (ref 21–51)
MCH RBC QN AUTO: 35.2 PG (ref 27–33)
MCHC RBC AUTO-ENTMCNC: 34.6 G/DL (ref 32–36)
MCV RBC AUTO: 101.8 FL (ref 79–99)
MONOCYTES # BLD AUTO: 0.2 K/UL (ref 0.1–1)
MONOCYTES NFR BLD AUTO: 2.6 % (ref 3–13)
NEUTROPHILS # BLD AUTO: 7.6 K/UL (ref 1.8–7.7)
NEUTROPHILS NFR BLD AUTO: 90.1 % (ref 40–77)
NRBC BLD MANUAL-RTO: 0 % (ref 0–0.19)
PLATELET # BLD AUTO: 156 K/UL (ref 130–400)
POTASSIUM SERPL-SCNC: 3.6 MMOL/L (ref 3.5–5.1)
RBC # BLD AUTO: 3.32 MIL/UL (ref 4–5.5)
SARS-COV-2 AG RESP QL IA.RAPID: (no result)
SODIUM SERPL-SCNC: 142 MMOL/L (ref 136–145)
WBC # BLD AUTO: 8.5 K/UL (ref 4.8–10.8)
WBC MORPH BLD: (no result)

## 2024-12-07 PROCEDURE — 99285 EMERGENCY DEPT VISIT HI MDM: CPT

## 2024-12-07 PROCEDURE — 83605 ASSAY OF LACTIC ACID: CPT

## 2024-12-07 PROCEDURE — 84484 ASSAY OF TROPONIN QUANT: CPT

## 2024-12-07 PROCEDURE — 36415 COLL VENOUS BLD VENIPUNCTURE: CPT

## 2024-12-07 PROCEDURE — 85025 COMPLETE CBC W/AUTO DIFF WBC: CPT

## 2024-12-07 PROCEDURE — 80048 BASIC METABOLIC PNL TOTAL CA: CPT

## 2024-12-07 PROCEDURE — 87804 INFLUENZA ASSAY W/OPTIC: CPT

## 2024-12-07 PROCEDURE — 93005 ELECTROCARDIOGRAM TRACING: CPT

## 2024-12-07 PROCEDURE — 87040 BLOOD CULTURE FOR BACTERIA: CPT

## 2024-12-07 PROCEDURE — 71045 X-RAY EXAM CHEST 1 VIEW: CPT

## 2024-12-07 PROCEDURE — 87426 SARSCOV CORONAVIRUS AG IA: CPT

## 2024-12-07 NOTE — HMCIMG
CHEST 1VW 



CLINICAL HISTORY:   SOB/COUGH 



COMPARISON: 5/31/2023 



TECHNIQUE:  Single view of the chest was obtained. 



FINDINGS: 

Lungs are clear.  The cardiac size is enlarged but stable. The bony

structures are within normal limits. 

 

IMPRESSION:  Stable cardiomegaly.

## 2024-12-07 NOTE — ERN
ED Note


History of Present Illness


Stated Complaint:  BODYACHES


Chief Complaint:  Flu Symptoms


Time Seen by MD:  16:29


Dictation:


PATIENT IS A 74-YEAR-OLD FEMALE COMING IN WITH FEVER CHILLS COUGH AND BODY ACHES

ONSET THIS MORNING.  SHE WAS AT HEMODIALYSIS AND HAD COMPLETED HER HEMODIALYSIS,

WHEN SHE STARTED HAVING THE CHILLS AND AN EMS UNIT WAS CALLED TO TRANSPORT HER 

[place] [facility] [place]  SHE DENIES NAUSEA VOMITING NO DIARRHEA NO LOSS OF TASTE OR SMELL.


Allergies:  


Coded Allergies:  


     No Known Drug Allergies (Unverified  Allergy, Unknown, 8/13/15)


Home Meds


Reported Medications


Isosorbide Mononitrate (Isosorbide Mononitrate ER) 30 Mg Tab.er.24h, 30 MG PO 

DAILY, TAB


   5/31/23


Amlodipine Besylate (Amlodipine Besylate) 10 Mg Tablet, 10 MG PO DAILY for 30 

Days, #30 TAB 0 Refills


   5/31/23


Clonidine HCl (Clonidine HCl) 0.1 Mg Tablet, 0.1 MG PO BID, TAB


   5/31/23


Carvedilol (Carvedilol) 3.125 Mg Tablet, 3.125 MG PO BID, TAB


   5/31/23


Hydralazine HCl (Hydralazine HCl) 100 Mg Tablet, 100 MG PO TID, TAB


   5/31/23


Aspirin (Aspirin) 81 Mg Tab.chew, 81 MG PO DAILY, TAB.CHEW


   5/31/23


Folic Acid/Vitamin B Comp W-C (Nephro-Warner Tablet) 0.8 Mg Tablet, 0.8 MG PO 

DAILY, TAB


   5/31/23


Rosuvastatin Calcium (Rosuvastatin Calcium) 20 Mg Tablet, 20 MG PO HS, TAB


   5/31/23


Linagliptin (Tradjenta) 5 Mg Tablet, 5 MG PO DAILY, TAB


   5/31/23


Hydroxychloroquine Sulfate (Hydroxychloroquine Sulfate) 200 Mg Tablet, 200 MG PO

DAILY, TAB


   5/31/23


Gabapentin (Gabapentin) 100 Mg Capsule, 100 MG PO DAILY, CAP


   5/31/23


Levothyroxine Sodium (Levothyroxine) 88 Mcg Capsule, 88 MCG PO DAILY, CAP


   5/31/23





Past Medical History


Past Medical History:  Anemia, Diabetes-Type II, Hypertension, Hypothyroid, 

Renal Disese, Renal Failure


Additional Past Medical Hx:  DIALYSIS TUES,THURS,SAT


Surgical History:  LAVA


Surgical History Other:  DIALYSIS SHUNT/PORT TO CHEST


Social History:  Negative, Other


RN Note Reviewed/Agreed w/PFSH:  Yes





Review of System


Dictation


CONSTITUTIONAL:  NEGATIVE EXCEPT FOR HPI FEVER CHILLS


HEAD/FACE:  NEGATIVE EXCEPT FOR HPI


EENT:  NEGATIVE EXCEPT FOR HPI


RESPIRATORY: NEGATIVE EXCEPT FOR HPI


GASTROINTESTINAL/ABDOMINAL:   NEGATIVE EXCEPT FOR HPI


GENITOURINARY: NEGATIVE EXCEPT FOR HPI


MUSCULOSKELETAL: NEGATIVE EXCEPT FOR HPI MALAISE


INTEGUMENTARY:  NEGATIVE EXCEPT FOR HPI


NEUROLOGICAL/PSYCH: NEGATIVE EXCEPT FOR HPI


HEMATOLOGIC/LYMPHATIC:  NEGATIVE EXCEPT FOR HPI





ALL SYSTEMS NEGATIVE, EXCEPT AS NOTED ABOVE.





13 POINT REVIEW OF SYSTEMS ASSESSED AND ALL NEGATIVE EXCEPT FOR ABOVE.





Initial Vital Sign


VS





                                   Vital Signs








  Date Time  Temp Pulse Resp B/P (MAP) Pulse Ox O2 Delivery O2 Flow Rate FiO2


 


12/7/24 16:28 98.2 75 18 125/65 98 Room Air 0 


 


12/7/24 16:41        21











Physical Exam


Dictation


VITAL SIGNS REVIEWED 


GENERAL APPEARANCE: ALERT, ORIENTED X 3, MY ACUTE DISTRESS, WELL DEVELOPED, 

NOURISHED. 


HEAD AND FACE: NON-TRAUMATIC.


EYES: PERRL, PINK CONJUNCTIVAS, EYELID NO TRAUMA, ANTERIOR CHAMBER WITH ARCUS 

SENILIS. 


EARS: PINNAS INTACT AND NO SIGNS OF TRAUMA OR ERYTHEMA EAR CANALS CLEAR AND NO 

DISCHARGE TM NO ERYTHEMA 


NOSE: N CLEAR DISCHARGE, NO BLEEDING. 


OROPHARYNX: MOUTH NORMAL, TONGUE PINK, 


PHARYNX CLEAR, MILD PHARYNGEAL ERYTHEMA, TONSILS NO EXUDATES, NO ABSCESSES 

NOTED,  MUCOUS MEMBRANE MOIST UVULA MIDLINE, VOICE IS CLEAR


NECK: SUPPLE, NON-TENDER, NO THYROMEGALY, NO MASSES, NO JVD, NO BRUITS 


BREAST:DEFERRED 


CHEST:NO TENDERNESS, NO CREPITUS, NO PARADOXICAL MOVEMENT, NO RETRACTIONS 


LUNGS:CLEAR, WELL-VENTILATED, SYMMETRIC, NO RALES, NO WHEEZING, NO RHONCHI, NO 

STRIDOR, GOOD BREATH SOUNDS BILATERALLY 


HEART: REGULAR RATE, REGULAR RHYTHM, NO MURMUR, NO GALLOPS 


VASCULAR: NO PERIPHERAL EDEMA, LEFT ARM FISTULA WITH A GOOD THRILL AND BRUIT


ABDOMEN: SOFT, POSITIVE BOWEL SOUNDS, NONDISTENDED, NO GUARDING, 


NONTENDER, NO REBOUND, NO MASSES NO HEPATOMEGALY, NO SPLENOMEGALY, NO DAWN'S 

SIGN, NO HERNIAS.


RECTAL: DEFERRED


GENITAL: DEFERRED


NEUROLOGICAL: NORMAL SPEECH,  MOTOR FUNCTION INTACT, SENSORY FUNCTION INTACT 


MUSCULOSKELETAL: NECK NONTENDER, FULL RANGE OF MOTION, BACK NONTENDER, FULL 

RANGE OF MOTION,


EXTREMITIES: NONTENDER, FULL RANGE OF MOTION 


SKIN: COLOR PINK, DRY, NO TURGOR, NO RASH, NO LACERATIONS, NO ABRASIONS, NO 

CONTUSIONS.


LYMPHATIC: DEFERRED





Results (Laboratory/Radiology)


Laboratory/Radiology





Laboratory Tests








Test


 12/7/24


16:51 12/7/24


16:57


 


White Blood Count


 8.5 K/uL


(4.8-10.8) 





 


Red Blood Count


 3.32 MIL/uL


(4.00-5.50)  L 





 


Hemoglobin


 11.7 g/dL


(12.0-16.0)  L 





 


Hematocrit


 33.8 % (36-48)


L 





 


Mean Corpuscular Volume


 101.8 fL


(79-99)  H 





 


Mean Corpuscular Hemoglobin


 35.2 pg


(27.0-33.0)  H 





 


Mean Corpuscular Hemoglobin


Concent 34.6 g/dL


(32.0-36.0) 





 


Red Cell Distribution Width


 12.4 %


(11.0-15.5) 





 


Platelet Count


 156 K/uL


(130-400) 





 


Mean Platelet Volume


 9.8 fL


(7.5-10.5) 





 


Immature Granulocyte % (Auto) 0.5 % (0-1)   


 


Neutrophils (%) (Auto)


 90.1 %


(40.0-77.0)  H 





 


Lymphocytes (%) (Auto)


 5.3 %


(21.0-51.0)  L 





 


Monocytes (%) (Auto)


 2.6 %


(3.0-13.0)  L 





 


Eosinophils (%) (Auto)


 1.1 %


(0.0-8.0) 





 


Basophils (%) (Auto)


 0.4 %


(0.0-5.0) 





 


Neutrophils # (Auto)


 7.6 K/uL


(1.8-7.7) 





 


Lymphocytes # (Auto)


 0.5 K/uL


(1.0-4.8)  L 





 


Monocytes # (Auto)


 0.2 K/uL


(0.1-1.0) 





 


Eosinophils # (Auto)


 0.09 K/uL


(0.00-0.70) 





 


Basophils # (Auto)


 0.03 K/uL


(0.00-0.20) 





 


Absolute Immature Granulocyte


(auto 0.04 K/uL


(0-1) 





 


Nucleated Red Blood Cells


 0.0 %


(0.0-0.19) 





 


White Cell Morphology Comment See comments   


 


Sodium Level


 142 mmol/L


(136-145) 





 


Potassium Level


 3.6 mmol/L


(3.5-5.1) 





 


Chloride Level


 102 mmol/L


(101-111) 





 


Carbon Dioxide Level


 31 mmol/L


(21-32) 





 


Blood Urea Nitrogen


 31 mg/dL


(7-18)  H 





 


Creatinine


 3.4 mg/dL


(0.5-1.0)  H 





 


Glomerular Filtration Rate


Calc 14 mL/min


(>90) 





 


Random Glucose


 141 mg/dL


()  H 





 


Lactic Acid Level


 1.9 mmol/L


(0.8-2.5) 





 


Total Calcium


 8.1 mg/dL


(8.5-10.1)  L 





 


Troponin I High Sensitivity


 16 ng/L (4-50)


 





 


Influenza Type A Antigen


 


 Negative For


Type A


 


Influenza Type B Antigen


 


 Negative For


Type B


 


SARS-CoV-2 Antigen (Rapid)


 


 PRESUMPTIVE


NEGATIVE





SERVICE DT: 12/07/24 1634  


 


REASON: SOB/COUGH  


ORDERING PHYSICIAN: KYRA ALCANTAR NP  


PROCEDURE: CXR1VW  -  CHEST 1VW  


 


CHEST 1VW   


 


CLINICAL HISTORY:   SOB/COUGH   


 


COMPARISON: 5/31/2023   


 


TECHNIQUE:  Single view of the chest was obtained.   


 


FINDINGS:   


Lungs are clear.  The cardiac size is enlarged but stable. The bony  


structures are within normal limits.   


 


IMPRESSION:  Stable cardiomegaly.


Labs Reviewed?:  Yes





ED Course


ED Course





Orders








Procedure Category Date Status





  Time 


 


Blood Cult BENNY 12/7/24 In Process





  16:34 


 


Lactic Acid LAB 12/7/24 Complete





  16:34 


 


Covid19 (Sars Antigen LAB 12/7/24 Complete





Rapid)  16:34 


 


Influenza Type A & B, LAB 12/7/24 Complete





Rapid  16:34 


 


Cbc With Differential LAB 12/7/24 Complete





  16:34 


 


Troponin I High LAB 12/7/24 Complete





Sensitivity  16:34 


 


12 Lead Ekg Tracing- EKG 12/7/24 Logged





Technical  16:34 


 


Chest 1vw RAD 12/7/24 Resulted





  16:34 


 


Basic Metabolic Panel LAB 12/7/24 Complete





  16:34 


 


Acetaminophen 500mg PHA 12/7/24 Complete





Tab (Tylenol 500mg T  17:00 


 


Urinalysis Profile LAB 12/7/24 Logged





  16:34 








Current Medications








 Medications


  (Trade)  Dose


 Ordered  Sig/Adenike


 Route


 PRN Reason  Start Time


 Stop Time Status Last Admin


Dose Admin


 


 Acetaminophen


  (TYLenol 500MG


 TAB)  1,000 mg  ONCE  ONCE


 PO


   12/7/24 17:00


 12/7/24 17:01 DC 12/7/24 16:48











Vital Signs








  Date Time  Temp Pulse Resp B/P (MAP) Pulse Ox O2 Delivery O2 Flow Rate FiO2


 


12/7/24 18:13 99.0 67 23 120/42 93 Room Air* 0 21


 


12/7/24 17:01 99.3 73 22 122/43 94 Room Air* 0 21


 


12/7/24 16:48 100.8       


 


12/7/24 16:41 100.8 80 12 175/50 96 Room Air* 0 21


 


12/7/24 16:28 98.2 75 18 125/65 98 Room Air 0 





EIGHTEEN 50, PATIENT IS HEMODYNAMICALLY STABLE AFEBRILE.  NO LABS X-RAY ETC. TO 

SUPPORT ADMISSION DIAGNOSIS WILL BE VIRAL SYNDROME PATIENT WILL BE DISCHARGED 

HOME TO FOLLOW UP WITH HER PRIMARY CARE DOCTOR IN THE NEXT 1-2 DAYS.





Medical Decision Making


MDM


MDM: 


DIFFERENTIAL DIAGNOSIS:  PNEUMONIA/BRONCHITIS/COVID SYNDROME/INFLUENZA/VIRAL 

SYNDROME/ELECTROLYTE IMBALANCE/DEHYDRATION/CHF


RATIONALE: TESTS CONSIDERED AND ORDERED SECONDARY TO SHARED DECISION MAKING 

INCLUDE:  EKG/LABS/RADIOLOGY/SWABS


PREVIOUS OUTSIDE RECORDS REVIEWED: OLD ER VISITS.  REVIEWED


RISK OF COMPLICATION AND/OR MORBIDITY OR MORTALITY OF PATIENT MANAGEMENT: NONE


MEDICATIONS-PER MEDICATION RECONCILIATION SEE NURSE'S NOTES


NEED FOR HOSPITALIZATION: PATIENT DOES NOT MEET CRITERIA FOR HOSPITALIZATION.  

NO CRITERIA AT THIS TIME


NEED FOR EMERGENCY MAJOR/MINOR SURGERY: NO





THERE ARE NO SOCIAL CONCERNS WITH THIS PATIENT.





PRESCRIPTION DRUG MANAGEMENT NONE


PRESCRIPTIONS WILL INCLUDE SYMPTOMATIC CARE





PATIENT'S PRIOR EXTERNAL MEDICAL RECORDS FROM OTHER ER VISITS WERE REVIEWED BY 

ME AS INDICATED.  PRIOR TESTING AND RESULTS FROM PREVIOUS VISITS WERE REVIEWED. 

PRIOR TESTS WERE TAKEN INTO ACCOUNT WITH MEDICAL DECISION MAKING AND RESOURCE 

UTILIZATION, INDEPENDENT HISTORIAN/HISTORIANS WERE USED TO OBTAIN COMPLETE 

MEDICAL HISTORY.





I INDEPENDENTLY INTERPRETED THE TEST THAT WERE PERFORMED, RESULTS WERE REVIEWED 

BY ME AND CONSIDERED FINDINGS ON RADIOLOGY IF ORDERED.





MEDICAL MANAGEMENT AND EXAMINATION INTERPRETATION DISCUSSIONS WERE HAD BY ME 

WITH OTHER QUALIFIED HEALTHCARE PROFESSIONALS AS INDICATED FOR THE PATIENT'S 

CARE.





DX & DISP


Disposition:  Discharge


Departure


Impression:  


   Primary Impression:  Viral syndrome


   Additional Impressions:  ESRD (end stage renal disease) on dialysis, Anemia, 

   chronic renal failure


Condition:  Stable





Additional Instructions:  


FOLLOW-UP WITH PRIMARY CARE PROVIDER IN 1 TO 2 DAYS.  TAKE MEDICATIONS AS 

DIRECTED HERE IN THE EMERGENCY ROOM.  OKAY TO CONTINUE HOME MEDICATIONS UNLESS 

OTHERWISE DISCUSSED DURING YOUR VISIT IN THE EMERGENCY ROOM TODAY.  RETURN TO 

YOUR NEAREST EMERGENCY ROOM IF SYMPTOMS WORSEN OR IF THERE IS NO IMPROVEMENT.  

CALL 911 IF YOU NEED IMMEDIATE ASSISTANCE.  TAKE TYLENOL OR MOTRIN 

OVER-THE-COUNTER AS NEEDED AND IF NO CONTRAINDICATIONS ARE PRESENT.  INCREASE OR

AL HYDRATION.  A WOUND CULTURE OR URINE CULTURE WAS ORDERED HERE IN THE 

EMERGENCY ROOM DEPARTMENT PLEASE FOLLOW-UP WITH PRIMARY CARE PROVIDER AND ADVISE

THEM TO GET REPEAT PORTS FROM OUR FACILITY.  IF YOU HAD ANY ACE WRAP/SPLINTS 

THAT WERE APPLIED HERE, PLEASE DO NOT REMOVE THEM UNTIL YOU SEE YOUR PRIMARY 

CARE OR SPECIALTY.





DIET AND ACTIVITY AS TOLERATED.  SEE YOUR PRIMARY CARE DOCTOR ON MONDAY FOR 

FOLLOW UP


Referrals:  


VICTOR MANUEL STACK M.D. (PCP)


Time of Disposition:  18:55


I have reviewed the case, and I agree with, Diagnosis and Plan











KYRA ALCANTAR NP               Dec 7, 2024 16:38

## 2024-12-08 NOTE — EKG
[facility]

                                       

Test Date:    2024               Test Time:    16:45:54

Pat Name:     BELLA SEGUNDO              Department:   ED

Patient ID:   [id number]           Room:          

Gender:       F                        Technician:   [id number]

:          1950               Requested By: KYRA ALCANTAR

Order Number: [id number]        Reading MD:   Shantanu Gaytan

                                 Measurements

Intervals                              Axis          

Rate:         80                       P:            8

NC:           123                      QRS:          21

QRSD:         102                      T:            84

QT:           411                                    

QTc:          457                                    

                           Interpretive Statements

Sinus rhythm

Multiform ventricular premature complexes

Low voltage, precordial leads

Consider anterior infarct

Compared to ECG 2024 07:38:42

Ventricular premature complex(es) now present

Low QRS voltage now present

Myocardial infarct finding now present

Atrial premature complex(es) no longer present

Electronically Signed On 2024 17:43:02 CST by Shantanu Gaytan



Please click the below link to view image of tracing.

## 2024-12-10 ENCOUNTER — HOSPITAL ENCOUNTER (INPATIENT)
Dept: HOSPITAL 90 - EDH | Age: 74
LOS: 8 days | Discharge: NURSING, REHAB OR LONG-TERM CARE | DRG: 871 | End: 2024-12-18
Attending: INTERNAL MEDICINE | Admitting: INTERNAL MEDICINE
Payer: COMMERCIAL

## 2024-12-10 VITALS — BODY MASS INDEX: 39.21 KG/M2 | HEIGHT: 60 IN | WEIGHT: 199.7 LBS

## 2024-12-10 DIAGNOSIS — N28.1: ICD-10-CM

## 2024-12-10 DIAGNOSIS — L02.211: ICD-10-CM

## 2024-12-10 DIAGNOSIS — K25.9: ICD-10-CM

## 2024-12-10 DIAGNOSIS — K21.9: ICD-10-CM

## 2024-12-10 DIAGNOSIS — D12.0: ICD-10-CM

## 2024-12-10 DIAGNOSIS — E87.20: ICD-10-CM

## 2024-12-10 DIAGNOSIS — E78.5: ICD-10-CM

## 2024-12-10 DIAGNOSIS — Z90.49: ICD-10-CM

## 2024-12-10 DIAGNOSIS — K21.00: ICD-10-CM

## 2024-12-10 DIAGNOSIS — K57.30: ICD-10-CM

## 2024-12-10 DIAGNOSIS — Z79.899: ICD-10-CM

## 2024-12-10 DIAGNOSIS — I50.32: ICD-10-CM

## 2024-12-10 DIAGNOSIS — Z99.2: ICD-10-CM

## 2024-12-10 DIAGNOSIS — I13.2: ICD-10-CM

## 2024-12-10 DIAGNOSIS — N18.6: ICD-10-CM

## 2024-12-10 DIAGNOSIS — K64.1: ICD-10-CM

## 2024-12-10 DIAGNOSIS — Z83.3: ICD-10-CM

## 2024-12-10 DIAGNOSIS — K64.8: ICD-10-CM

## 2024-12-10 DIAGNOSIS — A41.50: Primary | ICD-10-CM

## 2024-12-10 DIAGNOSIS — K75.0: ICD-10-CM

## 2024-12-10 DIAGNOSIS — E11.22: ICD-10-CM

## 2024-12-10 DIAGNOSIS — D25.9: ICD-10-CM

## 2024-12-10 DIAGNOSIS — D12.3: ICD-10-CM

## 2024-12-10 DIAGNOSIS — A06.9: ICD-10-CM

## 2024-12-10 DIAGNOSIS — R91.8: ICD-10-CM

## 2024-12-10 DIAGNOSIS — R16.0: ICD-10-CM

## 2024-12-10 DIAGNOSIS — Z20.822: ICD-10-CM

## 2024-12-10 DIAGNOSIS — K63.5: ICD-10-CM

## 2024-12-10 DIAGNOSIS — D12.4: ICD-10-CM

## 2024-12-10 DIAGNOSIS — E66.9: ICD-10-CM

## 2024-12-10 LAB
ALBUMIN SERPL-MCNC: 2.9 G/DL (ref 3.5–5)
ALT SERPL-CCNC: 49 U/L (ref 12–78)
APPEARANCE UR: (no result)
AST SERPL-CCNC: 35 U/L (ref 10–37)
BACTERIA URNS QL MICRO: (no result) /HPF
BASOPHILS # BLD AUTO: 0.03 K/UL (ref 0–0.2)
BASOPHILS NFR BLD AUTO: 0.2 % (ref 0–5)
BILIRUB DIRECT SERPL-MCNC: 0.5 MG/DL (ref 0–0.3)
BILIRUB SERPL-MCNC: 1.1 MG/DL (ref 0.2–1)
BILIRUB UR QL STRIP: 0.5 MG/DL
BNP SERPL-MCNC: 1430 PG/ML (ref 0–100)
BUN SERPL-MCNC: 31 MG/DL (ref 7–18)
CHLORIDE SERPL-SCNC: 99 MMOL/L (ref 101–111)
CO2 SERPL-SCNC: 29 MMOL/L (ref 21–32)
COLOR UR: YELLOW
CREAT SERPL-MCNC: 3.6 MG/DL (ref 0.5–1)
DEPRECATED SQUAMOUS URNS QL MICRO: (no result) /HPF (ref 0–2)
EOSINOPHIL # BLD AUTO: 0 K/UL (ref 0–0.7)
EOSINOPHIL NFR BLD AUTO: 0 % (ref 0–8)
ERYTHROCYTE [DISTWIDTH] IN BLOOD BY AUTOMATED COUNT: 12.4 % (ref 11–15.5)
GFR SERPL CREATININE-BSD FRML MDRD: 13 ML/MIN (ref 90–?)
GLUCOSE SERPL-MCNC: 172 MG/DL (ref 70–105)
GLUCOSE UR STRIP-MCNC: 200 MG/DL
HCT VFR BLD AUTO: 29.9 % (ref 36–48)
HGB UR QL STRIP: (no result)
IMM GRANULOCYTES # BLD: 0.11 K/UL (ref 0–1)
KETONES UR STRIP-MCNC: NEGATIVE MG/DL
LEUKOCYTE ESTERASE UR QL STRIP: NEGATIVE LEU/UL
LYMPHOCYTES # SPEC AUTO: 1 K/UL (ref 1–4.8)
LYMPHOCYTES NFR SPEC AUTO: 7 % (ref 21–51)
MCH RBC QN AUTO: 34.5 PG (ref 27–33)
MCHC RBC AUTO-ENTMCNC: 34.1 G/DL (ref 32–36)
MCV RBC AUTO: 101 FL (ref 79–99)
MICRO URNS: YES
MONOCYTES # BLD AUTO: 1.5 K/UL (ref 0.1–1)
MONOCYTES NFR BLD AUTO: 10.6 % (ref 3–13)
MUCOUS THREADS URNS QL MICRO: (no result) LPF
NEUTROPHILS # BLD AUTO: 11.4 K/UL (ref 1.8–7.7)
NEUTROPHILS NFR BLD AUTO: 81.4 % (ref 40–77)
NITRITE UR QL STRIP: NEGATIVE
NRBC BLD MANUAL-RTO: 0 % (ref 0–0.19)
PH UR STRIP: 6 [PH] (ref 5–8)
PLATELET # BLD AUTO: 151 K/UL (ref 130–400)
POTASSIUM SERPL-SCNC: 3.7 MMOL/L (ref 3.5–5.1)
PROT SERPL-MCNC: 7.3 G/DL (ref 6–8.3)
PROT UR QL STRIP: 600 MG/DL
RBC # BLD AUTO: 2.96 MIL/UL (ref 4–5.5)
RBC #/AREA URNS HPF: (no result) /HPF (ref 0–1)
SARS-COV-2 AG RESP QL IA.RAPID: (no result)
SODIUM SERPL-SCNC: 138 MMOL/L (ref 136–145)
SP GR UR STRIP: 1.02 (ref 1–1.03)
UROBILINOGEN UR STRIP-MCNC: 2 MG/DL (ref 0.2–1)
WBC # BLD AUTO: 14.1 K/UL (ref 4.8–10.8)
WBC #/AREA URNS HPF: (no result) /HPF (ref 0–1)

## 2024-12-10 PROCEDURE — 74176 CT ABD & PELVIS W/O CONTRAST: CPT

## 2024-12-10 PROCEDURE — 87880 STREP A ASSAY W/OPTIC: CPT

## 2024-12-10 PROCEDURE — 83690 ASSAY OF LIPASE: CPT

## 2024-12-10 PROCEDURE — 88312 SPECIAL STAINS GROUP 1: CPT

## 2024-12-10 PROCEDURE — 87086 URINE CULTURE/COLONY COUNT: CPT

## 2024-12-10 PROCEDURE — 76942 ECHO GUIDE FOR BIOPSY: CPT

## 2024-12-10 PROCEDURE — 87040 BLOOD CULTURE FOR BACTERIA: CPT

## 2024-12-10 PROCEDURE — A4620 VARIABLE CONCENTRATION MASK: HCPCS

## 2024-12-10 PROCEDURE — 36569 INSJ PICC 5 YR+ W/O IMAGING: CPT

## 2024-12-10 PROCEDURE — 99153 MOD SED SAME PHYS/QHP EA: CPT

## 2024-12-10 PROCEDURE — A4222 INFUSION SUPPLIES WITH PUMP: HCPCS

## 2024-12-10 PROCEDURE — 45385 COLONOSCOPY W/LESION REMOVAL: CPT

## 2024-12-10 PROCEDURE — 84484 ASSAY OF TROPONIN QUANT: CPT

## 2024-12-10 PROCEDURE — 81001 URINALYSIS AUTO W/SCOPE: CPT

## 2024-12-10 PROCEDURE — 82247 BILIRUBIN TOTAL: CPT

## 2024-12-10 PROCEDURE — A4606 OXYGEN PROBE USED W OXIMETER: HCPCS

## 2024-12-10 PROCEDURE — A9575 INJ GADOTERATE MEGLUMI 0.1ML: HCPCS

## 2024-12-10 PROCEDURE — 83036 HEMOGLOBIN GLYCOSYLATED A1C: CPT

## 2024-12-10 PROCEDURE — 86706 HEP B SURFACE ANTIBODY: CPT

## 2024-12-10 PROCEDURE — 82105 ALPHA-FETOPROTEIN SERUM: CPT

## 2024-12-10 PROCEDURE — 84145 PROCALCITONIN (PCT): CPT

## 2024-12-10 PROCEDURE — 45380 COLONOSCOPY AND BIOPSY: CPT

## 2024-12-10 PROCEDURE — C1894 INTRO/SHEATH, NON-LASER: HCPCS

## 2024-12-10 PROCEDURE — 80202 ASSAY OF VANCOMYCIN: CPT

## 2024-12-10 PROCEDURE — 49405 IMAGE CATH FLUID COLXN VISC: CPT

## 2024-12-10 PROCEDURE — A4215 STERILE NEEDLE: HCPCS

## 2024-12-10 PROCEDURE — 87426 SARSCOV CORONAVIRUS AG IA: CPT

## 2024-12-10 PROCEDURE — 84100 ASSAY OF PHOSPHORUS: CPT

## 2024-12-10 PROCEDURE — 87340 HEPATITIS B SURFACE AG IA: CPT

## 2024-12-10 PROCEDURE — 82948 REAGENT STRIP/BLOOD GLUCOSE: CPT

## 2024-12-10 PROCEDURE — 82330 ASSAY OF CALCIUM: CPT

## 2024-12-10 PROCEDURE — C1729 CATH, DRAINAGE: HCPCS

## 2024-12-10 PROCEDURE — 71045 X-RAY EXAM CHEST 1 VIEW: CPT

## 2024-12-10 PROCEDURE — 83735 ASSAY OF MAGNESIUM: CPT

## 2024-12-10 PROCEDURE — 94664 DEMO&/EVAL PT USE INHALER: CPT

## 2024-12-10 PROCEDURE — 85730 THROMBOPLASTIN TIME PARTIAL: CPT

## 2024-12-10 PROCEDURE — 90935 HEMODIALYSIS ONE EVALUATION: CPT

## 2024-12-10 PROCEDURE — G0500 MOD SEDAT ENDO SERVICE >5YRS: HCPCS

## 2024-12-10 PROCEDURE — 99152 MOD SED SAME PHYS/QHP 5/>YRS: CPT

## 2024-12-10 PROCEDURE — 80048 BASIC METABOLIC PNL TOTAL CA: CPT

## 2024-12-10 PROCEDURE — 80053 COMPREHEN METABOLIC PANEL: CPT

## 2024-12-10 PROCEDURE — 89051 BODY FLUID CELL COUNT: CPT

## 2024-12-10 PROCEDURE — 87205 SMEAR GRAM STAIN: CPT

## 2024-12-10 PROCEDURE — A4657 SYRINGE W/WO NEEDLE: HCPCS

## 2024-12-10 PROCEDURE — 87071 CULTURE AEROBIC QUANT OTHER: CPT

## 2024-12-10 PROCEDURE — 36415 COLL VENOUS BLD VENIPUNCTURE: CPT

## 2024-12-10 PROCEDURE — 87804 INFLUENZA ASSAY W/OPTIC: CPT

## 2024-12-10 PROCEDURE — 74183 MRI ABD W/O CNTR FLWD CNTR: CPT

## 2024-12-10 PROCEDURE — 83615 LACTATE (LD) (LDH) ENZYME: CPT

## 2024-12-10 PROCEDURE — 96365 THER/PROPH/DIAG IV INF INIT: CPT

## 2024-12-10 PROCEDURE — 86704 HEP B CORE ANTIBODY TOTAL: CPT

## 2024-12-10 PROCEDURE — 83880 ASSAY OF NATRIURETIC PEPTIDE: CPT

## 2024-12-10 PROCEDURE — 86316 IMMUNOASSAY TUMOR OTHER: CPT

## 2024-12-10 PROCEDURE — 43239 EGD BIOPSY SINGLE/MULTIPLE: CPT

## 2024-12-10 PROCEDURE — 84460 ALANINE AMINO (ALT) (SGPT): CPT

## 2024-12-10 PROCEDURE — 93005 ELECTROCARDIOGRAM TRACING: CPT

## 2024-12-10 PROCEDURE — 82378 CARCINOEMBRYONIC ANTIGEN: CPT

## 2024-12-10 PROCEDURE — 87186 SC STD MICRODIL/AGAR DIL: CPT

## 2024-12-10 PROCEDURE — 86753 PROTOZOA ANTIBODY NOS: CPT

## 2024-12-10 PROCEDURE — 84443 ASSAY THYROID STIM HORMONE: CPT

## 2024-12-10 PROCEDURE — 99285 EMERGENCY DEPT VISIT HI MDM: CPT

## 2024-12-10 PROCEDURE — 86038 ANTINUCLEAR ANTIBODIES: CPT

## 2024-12-10 PROCEDURE — A4223 INFUSION SUPPLIES W/O PUMP: HCPCS

## 2024-12-10 PROCEDURE — 83605 ASSAY OF LACTIC ACID: CPT

## 2024-12-10 PROCEDURE — 85025 COMPLETE CBC W/AUTO DIFF WBC: CPT

## 2024-12-10 PROCEDURE — 80076 HEPATIC FUNCTION PANEL: CPT

## 2024-12-10 PROCEDURE — 86304 IMMUNOASSAY TUMOR CA 125: CPT

## 2024-12-10 PROCEDURE — 88305 TISSUE EXAM BY PATHOLOGIST: CPT

## 2024-12-10 PROCEDURE — 85610 PROTHROMBIN TIME: CPT

## 2024-12-10 RX ADMIN — CEFTRIAXONE SODIUM ONE GM: 2 INJECTION, POWDER, FOR SOLUTION INTRAMUSCULAR; INTRAVENOUS at 22:28

## 2024-12-10 NOTE — [facility]
CT ABDOMEN/PELVIS W/O CONTRAST



HISTORY:  Bilateral flank pain



COMPARISON: 11/5/2016



TECHNIQUE: Multiple sequential axial images of the abdomen and pelvis

were obtained from the dome of the diaphragm through symphysis pubis.

Patient was not given contrast through intravenous route. Oral contrast

was not given.



FINDINGS:  No pleural effusion is seen bilaterally.  There is no

evidence of parenchymal disease or pulmonary nodule of the visualized

lower lungs.  Degenerative changes of the thoracolumbar spine are

present. The heart is not enlarged.



There is 3.7 cm right hepatic nodule. Intrahepatic biliary air is seen.

Postcholecystectomy changes are seen. There are bilateral renal cortical

scarring. Bilateral renal vascular calcifications are seen. The liver,

spleen, adrenal glands and pancreas are unremarkable.  There is no

evidence of hydronephrosis bilaterally.  No evidence of renal stone is

seen. There is diverticulosis.  Fecal material is seen in the colon. 

There are normal size retroperitoneal and mesenteric lymph nodes.  No

ascites is seen.  Atherosclerotic changes are present. Uterus is

enlarged with calcifications suggestive of fibroid uterus.



Pelvic sidewalls are symmetric bilaterally. Bladder is poorly distended.



IMPRESSION:

1.  There is 3.7 cm right hepatic mass with neoplastic process not

excluded. Intrahepatic biliary air is seen with postcholecystectomy

changes. Fibroid uterus. Diverticulosis.











CT was performed with one or more following dose reduction techniques:

automated exposure control, adjustment of the mA and kv according to

patient's size, or use of a iterative reconstruction technique.

## 2024-12-10 NOTE — ERN
ED Note


History of Present Illness


Stated Complaint:  GENERAL WEAKNESS


Chief Complaint:  General Complaint


Time Seen by MD:  21:19


Time Seen by Midlevel:  21:19


Dictation:


The patient is a 74-year-old female with a history of diabetes, ESRD on 

dialysis, last dialysis today, cholecystectomy who presents to the emergency 

department with complaints of generalized body weakness, bilateral flank pain, 

nausea, right upper abdominal pain onset two days ago.  Patient reported 

occasional burning urination.  Denies any known fever but patient was febrile in

ER.  Denies any cough, sore throat, ear pain.  Denies hematuria.


Allergies:  


Coded Allergies:  


     No Known Drug Allergies (Unverified  Allergy, Unknown, 8/13/15)


Home Meds


Reported Medications


Isosorbide Mononitrate (Isosorbide Mononitrate ER) 30 Mg Tab.er.24h, 30 MG PO 

DAILY, TAB


   5/31/23


Amlodipine Besylate (Amlodipine Besylate) 10 Mg Tablet, 10 MG PO DAILY for 30 

Days, #30 TAB 0 Refills


   5/31/23


Clonidine HCl (Clonidine HCl) 0.1 Mg Tablet, 0.1 MG PO BID, TAB


   5/31/23


Carvedilol (Carvedilol) 3.125 Mg Tablet, 3.125 MG PO BID, TAB


   5/31/23


Hydralazine HCl (Hydralazine HCl) 100 Mg Tablet, 100 MG PO TID, TAB


   5/31/23


Aspirin (Aspirin) 81 Mg Tab.chew, 81 MG PO DAILY, TAB.CHEW


   5/31/23


Folic Acid/Vitamin B Comp W-C (Nephro-Warner Tablet) 0.8 Mg Tablet, 0.8 MG PO 

DAILY, TAB


   5/31/23


Rosuvastatin Calcium (Rosuvastatin Calcium) 20 Mg Tablet, 20 MG PO HS, TAB


   5/31/23


Linagliptin (Tradjenta) 5 Mg Tablet, 5 MG PO DAILY, TAB


   5/31/23


Hydroxychloroquine Sulfate (Hydroxychloroquine Sulfate) 200 Mg Tablet, 200 MG PO

DAILY, TAB


   5/31/23


Gabapentin (Gabapentin) 100 Mg Capsule, 100 MG PO DAILY, CAP


   5/31/23


Levothyroxine Sodium (Levothyroxine) 88 Mcg Capsule, 88 MCG PO DAILY, CAP


   5/31/23





Past Medical History


Past Medical History:  Diabetes-Type II, Heart Disease, Hypertension, Renal 

Disese


Additional Past Medical Hx:  DIALYSIS TUES,THURS,SAT


Surgical History:  LAVA


Surgical History Other:  DIALYSIS SHUNT/PORT TO CHEST


Social History:  Negative, Other


RN Note Reviewed/Agreed w/PFSH:  Yes





Review of System


Dictation


Constitutional: Negative for ,chills, and weight loss positive for fever


Eyes: Negative for injury, pain,redness, and discharge


ENT: Negative for injury,pain or swelling


Cardiovascular: Negative for chest pain, palpitations, and edema


Respiratory: Negative for shortness of breath, cough, and wheezing, 


Abdomen/GI: Negative for vomiting, diarrhea, and constipation positive for 

abdominal pain, nausea


Back: Negative for injury and pain


: Negative for injury, bleeding and discharge positive for burning urination, 

flank pain


MS/Extremity: Negative for injury and deformity


Skin: Negative for rash, and discoloration


Neuro: Negative for headache, weakness, numbness, tingling, and seizure 


Psych: Negative for suicide ideation, homicidal ideation, and hallucinations





Initial Vital Sign


VS





                                   Vital Signs








  Date Time  Temp Pulse Resp B/P (MAP) Pulse Ox O2 Delivery O2 Flow Rate FiO2


 


12/10/24 21:13 101.1 72 18 144/50 97 Room Air 0 


 


12/10/24 21:45        28











Physical Exam


Dictation


Vital Signs reviewed 


General Appearance: Alert, oriented x 3, no acute distress, well developed, 

nourished. 


Head and Face: non-traumatic.


Eyes: PERRL, pink conjunctivas, eyelid no trauma, anterior chamber with arcus 

senilis. 


Ears: Pinnas intact and no signs of trauma or erythema ear canals clear and no 

discharge TM no erythema 


Nose: No discharge, no bleeding. 


Oropharynx: Mouth normal, tongue pink.


pharynx clear,no erythema, tonsils no exudates, no abscesses noted,  mucous 

membrane moist 


Neck: Supple, non-tender, no thyromegaly, no masses, no JVD, no bruits 


Breast:Deferred 


Chest:No tenderness, no crepitus, no paradoxical movement, no retractions 


Lungs:Clear, well-ventilated, symmetric, no rales, no wheezing, no rhonchi, no 

stridor, good breath sounds bilaterally 


Heart: Regular rate, regular rhythm, no murmur, no gallops 


Vascular: no peripheral edema, 


Abdomen: Soft, positive bowel sounds, nondistended, no guarding, 


Right upper quadrant tenderness, no rebound, no masses no hepatomegaly, no 

splenomegaly, no Wade's sign, no hernias.


Rectal: Deferred


Genital: Deferred


Neurological: Normal speech,  motor function intact, sensory function intact 


Musculoskeletal: Neck nontender, full range of motion, back nontender, full 

range of motion,


Extremities: nontender, full range of motion 


Skin: Color pink, dry, no turgor, no rash, no lacerations, no abrasions, no 

contusions.


Lymphatic: Deferred





Results (Laboratory/Radiology)


Laboratory/Radiology





Laboratory Tests








Test


 12/10/24


21:17 12/10/24


21:40 12/10/24


22:15


 


Influenza Type A Antigen


 Negative For


Type A 


 





 


Influenza Type B Antigen


 Negative For


Type B 


 





 


SARS-CoV-2 Antigen (Rapid)


 PRESUMPTIVE


NEGATIVE 


 





 


Group A Streptococcus Rapid


 negative


(NEGATIVE) 


 





 


White Blood Count


 


 14.1 K/uL


(4.8-10.8)  H 





 


Red Blood Count


 


 2.96 MIL/uL


(4.00-5.50)  L 





 


Hemoglobin


 


 10.2 g/dL


(12.0-16.0)  L 





 


Hematocrit


 


 29.9 % (36-48)


L 





 


Mean Corpuscular Volume


 


 101.0 fL


(79-99)  H 





 


Mean Corpuscular Hemoglobin


 


 34.5 pg


(27.0-33.0)  H 





 


Mean Corpuscular Hemoglobin


Concent 


 34.1 g/dL


(32.0-36.0) 





 


Red Cell Distribution Width


 


 12.4 %


(11.0-15.5) 





 


Platelet Count


 


 151 K/uL


(130-400) 





 


Mean Platelet Volume


 


 10.4 fL


(7.5-10.5) 





 


Immature Granulocyte % (Auto)  0.8 % (0-1)   


 


Neutrophils (%) (Auto)


 


 81.4 %


(40.0-77.0)  H 





 


Lymphocytes (%) (Auto)


 


 7.0 %


(21.0-51.0)  L 





 


Monocytes (%) (Auto)


 


 10.6 %


(3.0-13.0) 





 


Eosinophils (%) (Auto)


 


 0.0 %


(0.0-8.0) 





 


Basophils (%) (Auto)


 


 0.2 %


(0.0-5.0) 





 


Neutrophils # (Auto)


 


 11.4 K/uL


(1.8-7.7)  H 





 


Lymphocytes # (Auto)


 


 1.0 K/uL


(1.0-4.8) 





 


Monocytes # (Auto)


 


 1.5 K/uL


(0.1-1.0)  H 





 


Eosinophils # (Auto)


 


 0.00 K/uL


(0.00-0.70) 





 


Basophils # (Auto)


 


 0.03 K/uL


(0.00-0.20) 





 


Absolute Immature Granulocyte


(auto 


 0.11 K/uL


(0-1) 





 


Nucleated Red Blood Cells


 


 0.0 %


(0.0-0.19) 





 


Sodium Level


 


 138 mmol/L


(136-145) 





 


Potassium Level


 


 3.7 mmol/L


(3.5-5.1) 





 


Chloride Level


 


 99 mmol/L


(101-111)  L 





 


Carbon Dioxide Level


 


 29 mmol/L


(21-32) 





 


Blood Urea Nitrogen


 


 31 mg/dL


(7-18)  H 





 


Creatinine


 


 3.6 mg/dL


(0.5-1.0)  H 





 


Glomerular Filtration Rate


Calc 


 13 mL/min


(>90) 





 


Random Glucose


 


 172 mg/dL


()  H 





 


Lactic Acid Level


 


 2.3 mmol/L


(0.8-2.5) 





 


Total Calcium


 


 8.2 mg/dL


(8.5-10.1)  L 





 


Total Bilirubin


 


 1.1 mg/dL


(0.2-1.0)  H 





 


Direct Bilirubin


 


 0.5 mg/dL


(0.0-0.3)  H 





 


Aspartate Amino Transf


(AST/SGOT) 


 35 U/L (10-37)


 





 


Alanine Aminotransferase


(ALT/SGPT) 


 49 U/L (12-78)


 





 


Alkaline Phosphatase


 


 282 U/L


()  H 





 


Troponin I High Sensitivity


 


 29 ng/L (4-50)


 





 


B-Type Natriuretic Peptide


 


 1430 pg/mL


(0-100)  H 





 


Total Protein


 


 7.3 g/dL


(6.0-8.3) 





 


Albumin


 


 2.9 g/dL


(3.5-5.0)  L 





 


Lipase


 


 40 U/L (16-77)


 





 


Procalcitonin


 


 23.66 ng/mL


(0.05-0.5)  H 





 


Urine Color


 


 


 YELLOW


(YELLOW)


 


Urine Appearance


 


 


 CLOUDY (CLEAR)


H


 


Urine pH   6.0 (5.0-8.0)  


 


Urine Specific Gravity


 


 


 1.017


(1.001-1.031)


 


Urine Protein


 


 


 600 mg/dL


(NEGATIVE)  H


 


Urine Glucose (UA)


 


 


 200 mg/dL


(NEGATIVE)  H


 


Urine Ketones


 


 


 NEGATIVE mg/dL


(NEGATIVE)


 


Urine Occult Blood


 


 


 SMALL


(NEGATIVE)  H


 


Urine Nitrate


 


 


 NEGATIVE


(NEGATIVE)


 


Urine Bilirubin


 


 


 0.5 mg/dL


(NEGATIVE)  H


 


Urine Urobilinogen


 


 


 2.0 mg/dL


(0.2-1.0)  H


 


Urine Leukocyte Esterase


 


 


 NEGATIVE


Erika/uL


 


Urine RBC


 


 


 0-1 /HPF (0-1)





 


Urine WBC


 


 


 2-5 /HPF (0-1)


H


 


Urine Squamous Epithelial


Cells 


 


 RARE /HPF


(0-2)


 


Urine Bacteria


 


 


 RARE /HPF


(None Seen)





REASON: bilateral flank pain, ruq pain


ORDERING PHYSICIAN: BONNIE THOMPSON


PROCEDURE: ABD PEL WO  -  CT ABDOMEN/PELVIS W/O CONTRAST





CT ABDOMEN/PELVIS W/O CONTRAST





HISTORY:  Bilateral flank pain





COMPARISON: 11/5/2016





TECHNIQUE: Multiple sequential axial images of the abdomen and pelvis


were obtained from the dome of the diaphragm through symphysis pubis.


Patient was not given contrast through intravenous route. Oral contrast


was not given.





FINDINGS:  No pleural effusion is seen bilaterally.  There is no


evidence of parenchymal disease or pulmonary nodule of the visualized


lower lungs.  Degenerative changes of the thoracolumbar spine are


present. The heart is not enlarged.





There is 3.7 cm right hepatic nodule. Intrahepatic biliary air is seen.


Postcholecystectomy changes are seen. There are bilateral renal cortical


scarring. Bilateral renal vascular calcifications are seen. The liver,


spleen, adrenal glands and pancreas are unremarkable.  There is no


evidence of hydronephrosis bilaterally.  No evidence of renal stone is


seen. There is diverticulosis.  Fecal material is seen in the colon. 


There are normal size retroperitoneal and mesenteric lymph nodes.  No


ascites is seen.  Atherosclerotic changes are present. Uterus is


enlarged with calcifications suggestive of fibroid uterus.





Pelvic sidewalls are symmetric bilaterally. Bladder is poorly distended.





IMPRESSION:


1.  There is 3.7 cm right hepatic mass with neoplastic process not


excluded. Intrahepatic biliary air is seen with postcholecystectomy


changes. Fibroid uterus. Diverticulosis.

















CT was performed with one or more following dose reduction techniques:


automated exposure control, adjustment of the mA and kv according to


patient's size, or use of a iterative reconstruction technique.


Labs Reviewed?:  Yes


EKG:  (+) rhythm (Sinus rhythm), (+) MI (130)


EKG Comment:


EKG 12/10/2024 2141 ventricular rate 73, regular rate and rhythm, normal sinus 

rhythm, no STEMI, Pac





ED Course


ED Course





Orders








Procedure Category Date Status





  Time 


 


Influenza Type A & B, LAB 12/10/24 Complete





Rapid  21:19 


 


Covid19 (Sars Antigen LAB 12/10/24 Complete





Rapid)  21:19 


 


Rapid (Group A Strep) LAB 12/10/24 Complete





  21:19 


 


Cbc With Differential LAB 12/10/24 Complete





  21:33 


 


Troponin I High LAB 12/10/24 Complete





Sensitivity  21:33 


 


Urinalysis Profile LAB 12/10/24 Complete





  21:33 


 


12 Lead Ekg Tracing- EKG 12/10/24 Logged





Technical  21:33 


 


Acetaminophen 325 Tab PHA 12/10/24 Complete





 (Tylenol 325mg Tab  22:00 


 


Ondansetron 4mg Inj PHA 12/10/24 Complete





 (Zofran 4mg Inj)  22:00 


 


Chest 1vw RAD 12/10/24 Taken





  21:33 


 


Lipase LAB 12/10/24 Complete





  21:33 


 


Basic Metabolic Panel LAB 12/10/24 Complete





  21:33 


 


Blood Cult BENNY 12/10/24 In Process





  21:33 


 


B-Type Natriuretic LAB 12/10/24 Complete





Peptide  21:33 


 


Procalcitonin LAB 12/10/24 Complete





  21:33 


 


Lactic Acid LAB 12/10/24 Complete





  21:33 


 


Ceftriaxone 2gm Vial PHA 12/10/24 Complete





 (Rocephin 2gm Inj)  22:00 


 


Hepatic Function Panel LAB 12/10/24 Complete





  21:33 


 


Ct Abdomen/Pelvis W/O CT 12/10/24 Resulted





Contrast  21:38 


 


Zosyn 3.375gm+Ns 50ml PHA 12/11/24 In Process





 (Zosyn 3.375gm+Ns  00:30 


 


Vancomycin Protocol PHA 12/11/24 In Process





 (Vancomycin Protocol  00:30 


 


Pharmacy PHA 12/11/24 Complete





Communication  00:30 


 


Pharmacy To Renal PHA 12/11/24 Complete





Dose (Renal Dose)  00:08 


 


Mrcp(Abdwwo)Cholangiopancreato MRI 12/11/24 Logged





  00:08 


 


Vital Signs Every 4 CPOE 12/11/24 Transmitted





Hours  00:10 


 


Daily Weights CPOE 12/11/24 Transmitted





  00:10 


 


I&O Q Shift CPOE 12/11/24 Transmitted





  00:10 


 


Activity: Bed Rest CPOE 12/11/24 Transmitted





  00:10 


 


Nothing By Mouth DIET 12/11/24 Transmitted





  Breakfast 


 


Albuterol 0.083% PHA 12/11/24 Logged





2.5mg/3ml (Proventil  00:30 


 


Cbc With Differential LAB 12/12/24 Verified





  04:00 


 


Basic Metabolic Panel LAB 12/12/24 Verified





  04:00 


 


Magnesium LAB 12/12/24 Verified





  04:00 


 


Phosphorus LAB 12/12/24 Verified





  04:00 


 


Thyroid Stimulating LAB 12/12/24 Verified





Hormone  04:00 


 


Vancomycin Protocol PHA 12/11/24 Complete





 (Vancomycin Protocol  00:30 


 


Acetaminophen 325 Tab PHA 12/11/24 Logged





 (Tylenol 325mg Tab  00:30 


 


Lactulose 20 Gm/30 Ml PHA 12/11/24 Logged





Udcup (Constulose  00:30 


 


Ondansetron 4mg Inj PHA 12/11/24 Logged





 (Zofran 4mg Inj)  00:30 


 


Hydralazine 20mg Inj PHA 12/11/24 In Process





 (Apresoline 20mg In  00:30 


 


Labetalol 20mg Syg PHA 12/11/24 In Process





 (Trandate 20mg Syg)  00:30 


 


Apply Scds CPOE 12/11/24 Transmitted





  00:10 


 


Turn Patient Q2hrs CPOE 12/11/24 Transmitted





  00:10 


 


Elevate Hob At 30 CPOE 12/11/24 Transmitted





Degrees  00:10 


 


Admit Orders ADM 12/11/24 Transmitted





  00:10 


 


Condition: CPOE 12/11/24 Transmitted





  00:10 


 


Telemetry Monitoring CPOE 12/11/24 Transmitted





  00:10 


 


Initiate TIMMY 12/11/24 In Process





Hyperglycemia Protoco  00:10 


 


Hydromorphone 0.5mg PHA 12/11/24 Logged





Syg (Dilaudid 0.5mg  00:30 


 


General Surgery CONPHYSVC 12/11/24 Transmitted





Consult  00:18 


 


Nephrology Consult CONPHYS 12/11/24 Transmitted





  00:18 


 


Vancomycin Level LAB 12/17/24 Verified





  05:00 


 


Vancomycin 1.75 PHA 12/11/24 In Process





Gm/250 Ml Bag  01:30 


 


Vancomycin 750mg PHA 12/12/24 In Process





 (Vancomycin 750mg)  16:00 


 


Admit Orders ADM 12/11/24 Transmitted





  00:25 


 


Edm Admit Bridge Order ADM 12/11/24 Transmitted





  00:25 








Current Medications








 Medications


  (Trade)  Dose


 Ordered  Sig/Adenike


 Route


 PRN Reason  Start Time


 Stop Time Status Last Admin


Dose Admin


 


 Acetaminophen


  (TYLenol 325MG


 TAB)  650 mg  ONCE  ONCE


 PO


   12/10/24 22:00


 12/10/24 22:01 DC 12/10/24 22:29





 


 Ceftriaxone Sodium


  (Rocephin 2gm


 Inj)  2 gm  ONCE  ONCE


 IVPB


   12/10/24 22:00


 12/10/24 22:01 DC 12/10/24 22:28





 


 Ondansetron HCl


  (zoFRAN 4MG INJ)  4 mg  ONCE  ONCE


 IVP


   12/10/24 22:00


 12/10/24 22:01 DC 12/10/24 22:28











Vital Signs








  Date Time  Temp Pulse Resp B/P (MAP) Pulse Ox O2 Delivery O2 Flow Rate FiO2


 


12/10/24 23:44  69 24 146/45 95 Nasal Cannula* 2 28


 


12/10/24 22:29 100.2       


 


12/10/24 21:45 100.2 71 21 142/48 94 Nasal Cannula* 2 28


 


12/10/24 21:13 101.1 72 18 144/50 97 Room Air 0 











Medical Decision Making


MDM


The patient is a 74-year-old female with a history of diabetes, ESRD on 

dialysis, last dialysis today, cholecystectomy who presents to the emergency 

department with complaints of generalized body weakness, bilateral flank pain, 

nausea, right upper abdominal pain onset two days ago.  Patient reported 

occasional burning urination.  Denies any known fever but patient was febrile in

ER.  Denies any cough, sore throat, ear pain.  Denies hematuria.


CBC showed leukocytosis, mild macrocytic anemia, chemistry showed elevated BNP, 

mild hypochloremia, elevated lactic acid, elevated procalcitonin, urinalysis 

cloudy with negative leukocyte esterase negative nitrites, slightly elevated 

bilirubin, CT abdomen showed 3.7 cm right hepatic mass, intrahepatic biliary air

.  Patient will be admitted for further management


Differential diagnosis:  Sepsis, UTI, COVID-19 infection, flu, electrolyte 

imbalance, pyelonephritis


Comorbidities:  Diabetes, ESRD on dialysis


Tests considered and not ordered secondary to shared decision making include: 

none


Previous outside records reviewed: none


Risk of complication and/or morbidity or mortality of patient management: The 

patient meets criteria for admission.


Need for emergency major/minor surgery: No





There are no social concerns with this patient.





I independently interpreted the tests I ordered (labs, urinalysis, etc.).





I discussed the case with the hospitalist for admission. Elder who accepts 

admission. 





I discussed the case with the following specialists: none.





Historian: pateint. 





I independently interpreted imaging studies and EKGs that I ordered (US, CT, XR,

EKG, etc.). 





External chart review: none.





Medical management and examination interpretation discussions were had by me 

with other qualified healthcare professionals as indicated for the patient's 

care.





DX & DISP


Disposition:  Inpatient


Decision to Admit Date:  Dec 11, 2024


Decision to Admit Time:  00:31


Departure


Impression:  


   Primary Impression:  Sepsis


   Additional Impressions:  Fever, Leukocytosis, ESRD (end stage renal disease) 

   on dialysis, Elevated lactic acid level, Liver mass, Anemia, chronic renal 

   failure, Hypochloremia


Condition:  Stable


Referrals:  


ADRIEN MENDIETA (PCP)


I have reviewed the case, and I agree with, Diagnosis and Plan











BONNIE THOMPSON [facility]               Dec 10, 2024 21:42

## 2024-12-11 VITALS — OXYGEN SATURATION: 96 % | HEART RATE: 66 BPM | RESPIRATION RATE: 18 BRPM

## 2024-12-11 VITALS — HEART RATE: 66 BPM | OXYGEN SATURATION: 95 % | RESPIRATION RATE: 18 BRPM

## 2024-12-11 VITALS — OXYGEN SATURATION: 100 %

## 2024-12-11 VITALS
HEART RATE: 74 BPM | DIASTOLIC BLOOD PRESSURE: 45 MMHG | SYSTOLIC BLOOD PRESSURE: 135 MMHG | TEMPERATURE: 100.4 F | RESPIRATION RATE: 19 BRPM

## 2024-12-11 VITALS — RESPIRATION RATE: 18 BRPM | OXYGEN SATURATION: 93 % | HEART RATE: 64 BPM

## 2024-12-11 VITALS
HEART RATE: 72 BPM | RESPIRATION RATE: 19 BRPM | TEMPERATURE: 98.4 F | SYSTOLIC BLOOD PRESSURE: 165 MMHG | DIASTOLIC BLOOD PRESSURE: 79 MMHG

## 2024-12-11 LAB
ALBUMIN SERPL-MCNC: 2.5 G/DL (ref 3.5–5)
ALT SERPL-CCNC: 48 U/L (ref 12–78)
AST SERPL-CCNC: 34 U/L (ref 10–37)
BASOPHILS # BLD AUTO: 0.02 K/UL (ref 0–0.2)
BASOPHILS NFR BLD AUTO: 0.2 % (ref 0–5)
BILIRUB DIRECT SERPL-MCNC: 0.6 MG/DL (ref 0–0.3)
BILIRUB SERPL-MCNC: 1 MG/DL (ref 0.2–1)
BUN SERPL-MCNC: 38 MG/DL (ref 7–18)
CHLORIDE SERPL-SCNC: 101 MMOL/L (ref 101–111)
CO2 SERPL-SCNC: 29 MMOL/L (ref 21–32)
CREAT SERPL-MCNC: 4.3 MG/DL (ref 0.5–1)
EOSINOPHIL # BLD AUTO: 0.03 K/UL (ref 0–0.7)
EOSINOPHIL NFR BLD AUTO: 0.2 % (ref 0–8)
ERYTHROCYTE [DISTWIDTH] IN BLOOD BY AUTOMATED COUNT: 12.4 % (ref 11–15.5)
GFR SERPL CREATININE-BSD FRML MDRD: 10 ML/MIN (ref 90–?)
GLUCOSE SERPL-MCNC: 116 MG/DL (ref 70–105)
HBA1C MFR BLD: 5.4 % (ref 4–6)
HCT VFR BLD AUTO: 28.9 % (ref 36–48)
IMM GRANULOCYTES # BLD: 0.08 K/UL (ref 0–1)
LYMPHOCYTES # SPEC AUTO: 1.4 K/UL (ref 1–4.8)
LYMPHOCYTES NFR SPEC AUTO: 10.7 % (ref 21–51)
MAGNESIUM SERPL-MCNC: 1.8 MG/DL (ref 1.8–2.4)
MCH RBC QN AUTO: 34.6 PG (ref 27–33)
MCHC RBC AUTO-ENTMCNC: 33.6 G/DL (ref 32–36)
MCV RBC AUTO: 103.2 FL (ref 79–99)
MONOCYTES # BLD AUTO: 1.4 K/UL (ref 0.1–1)
MONOCYTES NFR BLD AUTO: 11 % (ref 3–13)
NEUTROPHILS # BLD AUTO: 9.8 K/UL (ref 1.8–7.7)
NEUTROPHILS NFR BLD AUTO: 77.3 % (ref 40–77)
NRBC BLD MANUAL-RTO: 0 % (ref 0–0.19)
PHOSPHATE SERPL-MCNC: 5.1 MG/DL (ref 2.5–4.9)
PLATELET # BLD AUTO: 141 K/UL (ref 130–400)
POTASSIUM SERPL-SCNC: 4 MMOL/L (ref 3.5–5.1)
PROT SERPL-MCNC: 6.7 G/DL (ref 6–8.3)
RBC # BLD AUTO: 2.8 MIL/UL (ref 4–5.5)
SODIUM SERPL-SCNC: 138 MMOL/L (ref 136–145)
WBC # BLD AUTO: 12.7 K/UL (ref 4.8–10.8)

## 2024-12-11 RX ADMIN — PIPERACILLIN SODIUM AND TAZOBACTAM SODIUM SCH GM: .375; 3 INJECTION, POWDER, LYOPHILIZED, FOR SOLUTION INTRAVENOUS at 01:01

## 2024-12-11 RX ADMIN — VANCOMYCIN ONE MLS/HR: 1.25 INJECTION, SOLUTION INTRAVENOUS at 01:29

## 2024-12-11 NOTE — HP
[facility]ISTORY [facility] PHYSICAL 





Date Patient Seen: Dec 11, 2024 


Time of Visit: 0000


Supervising Physician: Dr. Ceferino Barron ]





Primary Care Physician: [Dr. Delgado Isaacs ]


Outpatient Specialists: [ Dr. Swan, nephro ]]


Inpatient Consults: [Dr. Ramirez, surgery, Dr. Swan, nephro ]





PROBLEM LIST:


Sepsis-POA, at the time of admission source was unclear


3.7 cm right hepatic mass with neoplastic process not


excluded. Intrahepatic biliary air is seen with postcholecystectomy


changes-POA 


Hyperbilirubinemia, mild-POA


ESRD HD TT HS 


Primary hypertension 


HLD 


Diabetes mellitus 





Plan


-admit to med surge unit 


-keep patient NPO 


-obtain MRCP in a.m.


-multimodal pain management 


-unable to start IV fluid resuscitation due to patient's dialysis status 


-start on IV Zosyn and vancomycin, deescalate as warranted


-pending blood culture result 


-obtain tumor markers:  CEA, AFP, CEA-19, CEA-125


-consulted surgery, Dr. Osborn pending evaluation


-daily weight 


-consult Dr. Swan in a.m. for dialysis management








HPI:


[PER ED NOTES: The patient is a 74-year-old female with a history of diabetes, 

ESRD on dialysis, last dialysis today, cholecystectomy who presents to the 

emergency department with complaints of generalized body weakness, bilateral 

flank pain, nausea, right upper abdominal pain onset two days ago.  Patient re

ported occasional burning urination.  Denies any known fever but patient was 

febrile in ER.  Denies any cough, sore throat, ear pain.  Denies hematuria."





 services provided by bedside RN Helena. Daughter in-law was at 

bedside and claims that the patient has been suffering from nausea, fever and 

chills this past few days.  She has also been verbalizing right upper abdominal 

pain for the past 5 months worsening this past 2 days.  Today, while the patient

 was in dialysis, patient was getting more nauseous and weak that done dialysis 

nurse instructed her go to the ED for evaluation.  Apparently, the patient was 

here 2 days ago but did not find any significant findings on her workup and so 

she was discharge from the ED.  There was no CT abdomen done at that time.  At 

the ED, her preliminary lab works were concerning for sepsis and ED practitioner

 was suspected that this was secondary to UTI so she was given IV Rocephin.  CT 

abdomen pelvis 3.7 cm right hepatic mass with neoplastic process not


excluded. Intrahepatic biliary air is seen with postcholecystectomy changes.  

According to the patient, her cholecystectomy was done in [facility] but it was so many years ago that she can not remember when.  An 

immediate surgical consult was done with Dr. Osborn and recommended to keep 

patient NPO and to perform a follow-up MRCP in a.m..  Physical assessment was 

unrevealing except for right upper quadrant swelling with severe tenderness 

signs of ischemia or peritonitis.








PAST MEDICAL HX:


see above





PAST SURGICAL HX:


noncontributory





SOCIAL HISTORY:


No tobacco, ETOH, or illicit drug use


Coded Allergies:  


     No Known Drug Allergies (Unverified  Allergy, Unknown, 8/13/15)





REVIEW OF SYSTEMS: 


12 point ROS reviewed with patient. Pertinent positives mentioned above. 

Otherwise negative.





PHYSICAL EXAM: 


GENERAL: alert, weak, awake oriented x 3


HEENT: EOMI, Sclera non icteric, moist mucosa 


NECK: Supple, no JVD, trachea midline 


LUNGS: Clear breath sounds bilaterally. No wheezes


HEART: Regular rate and rhythm. Normal S1 and S2, without murmurs 


ABD: Abdomen soft but tender on RUQ. Bowel sounds present, RUQ is swollen, 

negative for ischemia or peritonitis


EXT: No clubbing cyanosis or edema


NEURO: Alert and oriented to person, follows commands





                             Vital Signs (last 8hr)








  Date Time  Temp Pulse Resp B/P (MAP) Pulse Ox O2 Delivery O2 Flow Rate FiO2


 


12/10/24 23:44  69 24 146/45 95 Nasal Cannula* 2 28


 


12/10/24 22:29 100.2       


 


12/10/24 21:45 100.2 71 21 142/48 94 Nasal Cannula* 2 28


 


12/10/24 21:13 101.1 72 18 144/50 97 Room Air 0 











LABS:





                                Hematology Labs:








Test


 12/10/24


21:40 Range/Units


 


 


White Blood Count 14.1 H 4.8-10.8  K/uL


 


Red Blood Count


 2.96 L


 4.00-5.50


MIL/uL


 


Hemoglobin 10.2 L 12.0-16.0  g/dL


 


Hematocrit 29.9 L 36-48  %


 


Mean Corpuscular Volume 101.0 H 79-99  fL


 


Mean Corpuscular Hemoglobin 34.5 H 27.0-33.0  pg


 


Mean Corpuscular Hemoglobin


Concent 34.1 


 32.0-36.0  g/dL





 


Red Cell Distribution Width 12.4  11.0-15.5  %


 


Platelet Count 151  130-400  K/uL


 


Mean Platelet Volume 10.4  7.5-10.5  fL


 


Immature Granulocyte % (Auto) 0.8  0-1  %


 


Neutrophils (%) (Auto) 81.4 H 40.0-77.0  %


 


Lymphocytes (%) (Auto) 7.0 L 21.0-51.0  %


 


Monocytes (%) (Auto) 10.6  3.0-13.0  %


 


Eosinophils (%) (Auto) 0.0  0.0-8.0  %


 


Basophils (%) (Auto) 0.2  0.0-5.0  %


 


Neutrophils # (Auto) 11.4 H 1.8-7.7  K/uL


 


Lymphocytes # (Auto) 1.0  1.0-4.8  K/uL


 


Monocytes # (Auto) 1.5 H 0.1-1.0  K/uL


 


Eosinophils # (Auto) 0.00  0.00-0.70  K/uL


 


Basophils # (Auto) 0.03  0.00-0.20  K/uL


 


Absolute Immature Granulocyte


(auto 0.11 


 0-1  K/uL





 


Nucleated Red Blood Cells 0.0  0.0-0.19  %








                                 Chemistry Labs:








Test


 12/10/24


21:40 Range/Units


 


 


Sodium Level 138  136-145  mmol/L


 


Potassium Level 3.7  3.5-5.1  mmol/L


 


Chloride Level 99 L 101-111  mmol/L


 


Carbon Dioxide Level 29  21-32  mmol/L


 


Blood Urea Nitrogen 31 H 7-18  mg/dL


 


Creatinine 3.6 H 0.5-1.0  mg/dL


 


Glomerular Filtration Rate


Calc 13 


 >90  mL/min





 


Random Glucose 172 H   mg/dL


 


Lactic Acid Level 2.3  0.8-2.5  mmol/L


 


Total Calcium 8.2 L 8.5-10.1  mg/dL


 


Total Bilirubin 1.1 H 0.2-1.0  mg/dL


 


Direct Bilirubin 0.5 H 0.0-0.3  mg/dL


 


Aspartate Amino Transf


(AST/SGOT) 35 


 10-37  U/L





 


Alanine Aminotransferase


(ALT/SGPT) 49 


 12-78  U/L





 


Alkaline Phosphatase 282 H   U/L


 


Troponin I High Sensitivity 29  4-50  ng/L


 


B-Type Natriuretic Peptide 1430 H 0-100  pg/mL


 


Total Protein 7.3  6.0-8.3  g/dL


 


Albumin 2.9 L 3.5-5.0  g/dL


 


Lipase 40  16-77  U/L


 


Procalcitonin 23.66 H 0.05-0.5  ng/mL











DIAGNOSTICS / RADIOLOGY RESULTS:


[ ]





PLAN 





NEURO: 


Minimize central acting medications as possible. 


Maintain fall precautions, adequate lighting during the day





PULMONARY: 


Supplemental 02 as needed. 


Maintain aspiration precautions at all times





CARDIOVASCULAR: 


Follow hemodynamics. 


Vital signs per facility protocol





GI & NUTRITION:


Continue with nutritional support.


Continue stool softeners and laxatives as needed.





KIDNEYS & ELECTROLYTES: 


Strict monitoring of intake, output and overall fluid balance. 


Avoid nephrotoxic medications to the extent possible. 


Medications to be dosed according to renal function.


Monitor electrolytes and replace as needed





ENDOCRINE:


Maintain blood glucose between 100-180 at all times.


Hypoglycemia protocol in place





INFECTIOUS DISEASE: 


Trend temperature, WBC and procalcitonin level


Follow cultures, deescalate antibiotics as soon as possible.


Panculture if new onset fever





ONCOLOGY/HEMATOLOGY/COAGULATION: 


Monitor for s/s of bleeding


Monitor hemoglobin, coagulation studies as needed





SKIN:


Pressure ulcer prevention per facility protocol


Specialty mattress





ORTHO/REHAB:


Continue PT/OT 





Prophylaxis:


Continue GI and DVT prophylaxis





Code Status: 


Full Resuscitation





Disposition:


TBD





Other:


Total patient care time :  45 minutes











RADHA ZUNIGA AGPCNAHOMI       Dec 11, 2024 00:18

## 2024-12-11 NOTE — NUR
DCP: HOME with current services



Sw met with pt who lives alone in [facility]. Pt is a dialysis patient at  [facility]  
[facility] at 2pm. Pt uses [employer] transport to treatments. Pt's sister is her provider daily, to 
assist pt as needed with ADLS, home management and meal prep. Pt has a w/c, and shower 
chair. PCP is CRISTIANO Isaacs and she uses [facility] for rx. Pt denies need for SNF, plans to return 
home at [place].

-------------------------------------------------------------------------------

Addendum: 12/11/24 at 1534 by NADER LAMB SS

-------------------------------------------------------------------------------

Amended: Links added.

## 2024-12-11 NOTE — NUR
1810 REPORT GIVEN TO ANN MARI, PT STABLE NO DISTRESS VITALS WNL NO C/O PAIN 
NOW. PT TRANFERRED IN BED W/ PERSONAL BELONGINGS.

## 2024-12-11 NOTE — NUR
PT IN ROOM 330

AOX3, STABLE VS, 18G RIGHT HAND, SL. LAVA. NO S/S OF RESPIRATORY DISTRESS. NO PAIN AT THIS 
TIME. TELE MONITOR IN PLACED TELE #35 SR 70, BED LOW AND LOCKED, SIDERAILS X2 UP, CALL LIGHT 
WITHIN REACH. WILL CONTINUE TO MONITOR.

## 2024-12-11 NOTE — CONS
CONSULT NOTE:


Consulting physician:  Gabriel





Consulting service:  General surgery





Reason for consultation:  Concerns of liver abscess





History of present illness:


This is a 74-year-old female with a significant medical history listed below 

there has been consulted to surgery for concerns of liver abscess noted on 

recent imaging.  Patient initially presenting to ED for concerns of generalized 

body weakness with the abdominal pain associated with nausea for the last two 

days.  At time of exam patient reporting no significant abdominal pain.  Imaging

performed concerning for fluid collection in the anterior segment of the right 

lobe of the liver measuring 3 x 3.4 cm.  Concerns for possible abscess noted.  

Patient also with history cholecystectomy at this time.  WBCs elevated patient 

also with concerns of elevated temperatures with stable blood pressure.  Patient

currently NPO.  No family at bedside





Medical history:





Hyperbilirubinemia, mild-POA


ESRD HD TT HS 


Primary hypertension 


HLD 


Diabetes mellitus 





Review of systems:


General:  No Fever, No Chills, No Night Sweats, No Fatigue, No Malaise, No 

Appetite, No Other


HEENT:  No Head Aches, No Visual Changes, No Eye Pain, No Ear Pain, No 

Dysphasia, No Sinus Congestion, No Post Nasal Drip, No Sore Throat, No Other


Pulmonary:  No Dyspnea, No Cough, No Pleuritic Chest Pain, No Other


Cardiovascular:  No: Chest Pain, Palpitations, Orthopnea, Paroxysmal No Dyspnea,

Edema, Lt Headedness, Other


Gastrointestinal: No: Nausea, Vomiting, Diarrhea, Constipation, Melena, 

Hematochezia, Other


Genitourinary:  No Dysuria, No Frequency, No Incontinence, No Hematuria, No 

Retention, No Other


Musculoskeletal:  No: other, neck pain, shoulder pain, arm pain, back pain, hand

pain, leg pain, foot pain


Skin:  No Urticaria, No Rash, No Other


Neurological:  No: Weakness, Numbness, Incoordination, Change in speech, 

Confusion, Seizures, Other





Physical exam: 


GENERAL: alert, weak, awake oriented x 3


HEENT: EOMI, Sclera non icteric, moist mucosa 


NECK: Supple, no JVD, trachea midline 


LUNGS: Clear breath sounds bilaterally. No wheezes


HEART: Regular rate and rhythm. Normal S1 and S2, without murmurs 


ABD: Abdomen soft but tender on RUQ. Bowel sounds present, RUQ is swollen, 

negative for ischemia or peritonitis


EXT: No clubbing cyanosis or edema


NEURO: Alert and oriented to person, follows commands





Assessment:


This is a 74-year-old female with concerns of liver abscess





Plan:


This point in time recommendation be consultation to IR for possible 

percutaneous drain placement


Patient to continue with IV fluids and IV antibiotics


No immediate surgical intervention planned at this time


Dr. Osborn to be updated in patient's status and surgical team to follow 

patient closely


Nursing report any further acute events











KESHIA CASAS Jr.       Dec 11, 2024 17:16

## 2024-12-11 NOTE — NUR
1800 SPOKE TO DR. LIU AWARE OF CONSULT, PROVIDED HER W/ PT MRCP AND CT 
RESULTS, WILL COME SEE PT. NO NEW ORDERS RIGHT NOW.

## 2024-12-11 NOTE — CONS
GASTROENTEROLOGY CONSULTATION NOTE








Date of Consultation: Dec 11, 2024 


Time of Consultation: 23:37





History of Present Illness:


This is a 74-year-old female with past medical history of diabetes, end-stage 

renal disease on hemodialysis, history of cholecystectomy who presented due to 

generalized body weakness, flank pain, nausea, right upper quadrant pain.  She 

has been having nausea, fever and chills over the past few days and right upper 

quadrant pain.  Upon further evaluation she underwent a CT of the abdomen and 

pelvis that revealed a 3.7 cm right hepatic mass with neoplastic process not 

excluded.  Intrahepatic biliary air seen with postcholecystectomy, fibroid uter

us and diverticulosis.  MRCP was done that revealed a 3.4 cm focal fluid 

collection at the anterior segment right lobe of the liver with an air-fluid 

level consistent with abscess.  Absent gallbladder.  WBC 12.7, hemoglobin 9.7 

with a platelet count of 141.  We were consulted due to liver abscess and 

possible colonoscopy. Patient was seen outpatient with plan for EGD and 

colonoscopy.





Review of Systems:


CONSTITUTIONAL: No malaise or change in sensation of wellbeing.


ENMT: No rhinorrhea, otorrhea, sinus pain, ear ache.


CARDIOVASCULAR: No angina, palpitations, orthopnea or paroxysmal dyspnea.


RESPIRATORY: No SOB.


GASTROINTESTINAL: No abdominal pain, nausea, vomiting, diarrhea, hematemesis, 

melena or change in the patient's habitual bowel movements consistency/number.


GENITOURINARY: No dysuria, hematuria or change in bladder continence.


MUSCULOSKELETAL: No new muscle pain or decrease in muscular strength. No new 

joint swelling, redness or tenderness.


SKIN: No new rash.





Past Medical History:  


[ ]





Past Surgical History:  


[ ]





Past Social History:  


[ ]





Family History:


[ ]


Coded Allergies:  


     No Known Drug Allergies (Unverified  Allergy, Unknown, 8/13/15)





Physical Exam:


GEN: Awake, alert, oriented in person, time and place, and in no acute distress.


HEENT: No sinus tenderness. Tympanic membranes were not examined. No rhinorrhea.

Oral pharyngeal mucosa is pink, moist and within normal limits. Neck is supple 

with no cervical lymphadenopathy, thyromegaly or JVD.


CHEST: Inspection, palpation and percussion of the chest were unremarkable. Lung

auscultation revealed normal breath sounds bilaterally.


CARDIAC: PMI is within normal limits. Heart sounds are regular. Normal S1, S2. 

No gallop or murmur.


ABD: Soft, non-tender and not distended. No peritoneal signs on palpation. No 

organomegaly. Normal bowel sounds.


EXT: No cyanosis or clubbing. No edema.


SKIN: Intact. No rashes.


JOINTS: No evidence of synovitis or acute arthritis.


NEURO: Alert and oriented to name, place and person. Cranial nerve examination 

is unremarkable. No focal motor deficits. Normal speech. Gait is normal. 

Strength is normal.





                           Vital Sign (Last 24 Hours)








 12/11/24 12/11/24





 19:00 19:11


 


Temp 98.4 


 


Pulse  66


 


Resp  18


 


B/P (MAP) 165/79 


 


Pulse Ox 100 


 


O2 Delivery  N/Cannula Low lpm


 


O2 Flow Rate  2.0


 


FiO2  28











Laboratory:


[ ]








                                   Laboratory:








Test


 12/11/24


20:53 12/11/24


06:53 12/11/24


01:03 12/10/24


22:15 Range/Units


 


 


Whole Blood Glucose 117 H      MG/DL


 


White Blood Count  12.7 H   4.8-10.8  K/uL


 


Red Blood Count


 


 2.80 L


 


 


 4.00-5.50


MIL/uL


 


Hemoglobin  9.7 L   12.0-16.0  g/dL


 


Hematocrit  28.9 L   36-48  %


 


Mean Corpuscular Volume  103.2 H   79-99  fL


 


Mean Corpuscular Hemoglobin  34.6 H   27.0-33.0  pg


 


Mean Corpuscular Hemoglobin


Concent 


 33.6 


 


 


 32.0-36.0  g/dL





 


Red Cell Distribution Width  12.4    11.0-15.5  %


 


Platelet Count  141    130-400  K/uL


 


Mean Platelet Volume  10.3    7.5-10.5  fL


 


Immature Granulocyte % (Auto)  0.6    0-1  %


 


Neutrophils (%) (Auto)  77.3 H   40.0-77.0  %


 


Lymphocytes (%) (Auto)  10.7 L   21.0-51.0  %


 


Monocytes (%) (Auto)  11.0    3.0-13.0  %


 


Eosinophils (%) (Auto)  0.2    0.0-8.0  %


 


Basophils (%) (Auto)  0.2    0.0-5.0  %


 


Neutrophils # (Auto)  9.8 H   1.8-7.7  K/uL


 


Lymphocytes # (Auto)  1.4    1.0-4.8  K/uL


 


Monocytes # (Auto)  1.4 H   0.1-1.0  K/uL


 


Eosinophils # (Auto)  0.03    0.00-0.70  K/uL


 


Basophils # (Auto)  0.02    0.00-0.20  K/uL


 


Absolute Immature Granulocyte


(auto 


 0.08 


 


 


 0-1  K/uL





 


Nucleated Red Blood Cells  0.0    0.0-0.19  %


 


Sodium Level  138    136-145  mmol/L


 


Potassium Level  4.0    3.5-5.1  mmol/L


 


Chloride Level  101    101-111  mmol/L


 


Carbon Dioxide Level  29    21-32  mmol/L


 


Blood Urea Nitrogen  38 H   7-18  mg/dL


 


Creatinine  4.3 H   0.5-1.0  mg/dL


 


Glomerular Filtration Rate


Calc 


 10 


 


 


 >90  mL/min





 


Random Glucose  116 H     mg/dL


 


Hemoglobin A1c  5.4    4.0-6.0  %


 


Estimated Average Glucose


(eAG) 


 108 


 


 


   mg/dL





 


Total Calcium  8.0 L   8.5-10.1  mg/dL


 


Ionized Calcium


 


 1.01 L


 


 


 1.16-1.32


MMOL/L


 


Phosphorus Level  5.1 H   2.5-4.9  mg/dL


 


Magnesium Level


 


 1.80 


 


 


 1.80-2.40


mg/dL


 


Total Bilirubin  1.0    0.2-1.0  mg/dL


 


Direct Bilirubin  0.6 H   0.0-0.3  mg/dL


 


Aspartate Amino Transf


(AST/SGOT) 


 34 


 


 


 10-37  U/L





 


Alanine Aminotransferase


(ALT/SGPT) 


 48 


 


 


 12-78  U/L





 


Alkaline Phosphatase  243 H     U/L


 


Total Protein  6.7    6.0-8.3  g/dL


 


Albumin  2.5 L   3.5-5.0  g/dL


 


Lactic Acid Level   0.7 L  0.8-2.5  mmol/L


 


Urine Color    YELLOW  YELLOW  


 


Urine Appearance    CLOUDY H CLEAR  


 


Urine pH    6.0  5.0-8.0  


 


Urine Specific Gravity    1.017  1.001-1.031  


 


Urine Protein    600 H NEGATIVE  mg/dL


 


Urine Glucose (UA)    200 H NEGATIVE  mg/dL


 


Urine Ketones    NEGATIVE  NEGATIVE  mg/dL


 


Urine Occult Blood    SMALL H NEGATIVE  


 


Urine Nitrate    NEGATIVE  NEGATIVE  


 


Urine Bilirubin    0.5 H NEGATIVE  mg/dL


 


Urine Urobilinogen    2.0 H 0.2-1.0  mg/dL


 


Urine Leukocyte Esterase


 


 


 


 NEGATIVE 


 NEGATIVE


Erika/uL


 


Urine RBC    0-1  0-1  /HPF


 


Urine WBC    2-5 H 0-1  /HPF


 


Urine Squamous Epithelial


Cells 


 


 


 RARE 


 0-2  /HPF





 


Urine Bacteria    RARE  None Seen  /HPF


 


Test


 12/10/24


21:40 12/10/24


21:17 


 


 Range/Units


 


 


Troponin I High Sensitivity 29     4-50  ng/L


 


B-Type Natriuretic Peptide 1430 H    0-100  pg/mL


 


Lipase 40     16-77  U/L


 


Procalcitonin 23.66 H    0.05-0.5  ng/mL


 


Influenza Type A Antigen


 


 Negative For


Type A 


 


 NEGATIVE  





 


Influenza Type B Antigen


 


 Negative For


Type B 


 


 NEGATIVE  





 


SARS-CoV-2 Antigen (Rapid)


 


 PRESUMPTIVE


NEGATIVE 


 


 NEGATIVE  





 


Group A Streptococcus Rapid  negative    NEGATIVE  











                               Current Medications








 Medications


  (Trade)  Dose


 Ordered  Sig/Adenike


 Route


 PRN Reason  Start Time


 Stop Time Status Last Admin


Dose Admin


 


 Acetaminophen


  (TYLenol 325MG


 TAB)  650 mg  Q6H  PRN


 PO


 FEVER/MILD PAIN LEVEL 1-3  12/11/24 00:30


 1/10/25 00:29   





 


 Albuterol Sulfate


  (Proventil


 0.083% 2.5mg/3ml)  2.5 mg  [id number]  PRN


 IH


 SHORTNESS OF BREATH  12/11/24 00:30


 1/10/25 00:29   





 


 Hydralazine HCl


  (APRESOLine 20MG


 INJ)  10 mg  Q6H  PRN


 IV


 SBP GREATER THAN 180  12/11/24 00:30


 1/10/25 00:29   





 


 Hydromorphone HCl


  (DiLAUDid 0.5MG


 INJ)  0.2 mg  Q4H  PRN


 IVP


 SEVERE PAIN (7-10)  12/11/24 00:30


 12/16/24 00:29  12/11/24 10:44


0.2 MG


 


 Labetalol HCl


  (TRANdate 20MG


 SYG)  10 mg  Q2H  PRN


 IV


 SBP GREATER THAN 180  12/11/24 00:30


 1/10/25 00:29   





 


 Lactulose


  (Constulose 20gm/


 30ml Udcup)  20 gm  Q6H  PRN


 PO


 CONSTIPATION  12/11/24 00:30


 1/10/25 00:29   





 


 Ondansetron HCl


  (zoFRAN 4MG INJ)  4 mg  Q6H  PRN


 IVP


 NAUSEA/VOMITING  12/11/24 00:30


 1/10/25 00:29   





 


 Pharmacy Profile


 Note


  (Pharmacy


 Communication)  1 each  ONCE


 MISC


   12/11/24 00:30


 12/11/24 00:26 DC  





 


 Piperacillin Sod/


 Tazobactam Sod


  (Zosyn


 3.375gm+NS 50ml)  3.375 gm  Q12H


 IV


   12/11/24 00:30


 12/21/24 00:29  12/11/24 12:45


3.375 GM


 


 Vancomycin HCl


  (Vancomycin


 750mg)  750 mg  TTHSPHD


 IVPB


   12/12/24 16:00


 12/22/24 15:59   





 


 Vancomycin HCl


  (Vancomycin


 Protocol)  1 each  AD


 IV


   12/11/24 00:30


 12/11/24 00:25 DC  





 


 Vancomycin HCl


  (Vancomycin


 Protocol)  1 each  AD


 IV


   12/11/24 00:30


 12/25/24 00:29   





 


 Vitamin B Complex/


 Vit C/Folic Acid


  (Nephrovite


 Tablet)  1 cap  DAILY


 PO


   12/12/24 09:00


 1/11/25 08:59   














Diagnostics / Radiology:


[COPY/PASTE HERE IF NO REPORTS PLEASE DELETE SECTION]





Assessment:  


Liver abscess





Plan:











ENRIQUETA GARCIA FNP            Dec 11, 2024 23:37

## 2024-12-11 NOTE — HMCIMG
MRCP(ABDWWO)CHOLANGIOPANCREATO



REASON: R/O ASCENDING CHOLANGITIS



COMPARISON: CT abdomen and pelvis 12/10/2024



TECHNIQUE: Routine imaging protocol was performed. Images were also

obtained pre and postgadolinium contrast infusion, 20 cc Clariscan IV. 



FINDINGS: 



There is a 3 x 3.4 cm fluid collection in the anterior segment of the

right lobe of the liver, relatively high under the diaphragm. This has a

short air-fluid level. There is peripheral contrast enhancement.

Findings are consistent with a hepatic abscess.



There are no other focal liver lesions. Portal and hepatic veins appear

patent. Gallbladder is absent. There is a small amount of air in the

biliary tree, probably from previous S sphincterotomy. Intrahepatic

biliary tree does not appear distended. Common duct measures between 4

and 5 mm near the head of the pancreas.



There is renal cortical thinning moderate in degree. There are small

bilateral renal cysts. Spleen and pancreas appear normal. Pancreatic

duct is not dilated. There are no focal fluid collections. There is no

free air or fluid. Anterior abdominal wall appears intact.



IMPRESSION: 

  

1. 3 x 3.4 cm focal fluid collection in the anterior segment right lobe

of the liver, with an air-fluid level, consistent with abscess.

2. Absent gallbladder

3. Moderate bilateral renal cortical thinning, there are also bilateral

renal cysts.

## 2024-12-11 NOTE — HMCIMG
CHEST 1VW



REASON: cp



COMPARISON: 12/7/2024



FINDINGS:  

Single view of the chest was obtained. Lungs are clear.  There is mild

cardiac megaly, unchanged.  There is no pulmonary vascular congestion.

Mediastinum and bony thorax appear unremarkable.



IMPRESSION:



1.  Mild cardiomegaly, unchanged no acute finding.

## 2024-12-11 NOTE — NUR
SPOKE TO ALON FLOREZ BENCHMARK CLARIFIED ORDER,  PT WILL HAVE MRCP PROCEDURE THIS 
MORNING, IS AWARE OF PT GFR, DENISSE [facility] RAD DEPT.

## 2024-12-11 NOTE — PN
[facility] PROGRESS NOTE 





Date Patient Seen: Dec 11, 2024 


Time of Visit: 16:19


Supervising Physician: JUAN DIAZ





Primary Care Physician: [Dr. Delgado Isaacs ]


Outpatient Specialists: [ Dr. Swan, nephro ]]


Inpatient Consults: [Dr. Ramirez, surgery, Dr. Swan, nephro ]





PROBLEM LIST:


Sepsis secondary to 3x3.4 Hepatic Abscess 


ESRD HD TT HS 


Chronic Congestive Diastolic Heart Failure , Echo 2023 EF of 65% 


Primary hypertension 


HLD 


Diabetes mellitus II Hg A1c of 7.2 % from 2019





INTERVAL HISTORY:


Patient was this time is status post MRCP revealing findings consistent with a3 

x 3.4 hepatic abscess.  Leukocytosis is trending down.  Procalcitonin is at 

23.66, total bilirubin is now at 1.0, AST 34, ALT 48.  Alkaline phosphatase 

remains elevated at 243.  


We have consulted General surgery as well as GI for further recommendations 

regarding abscess.  At this time patient will continue with Zosyn, blood 

cultures have been drawn and pending.  


T-max of 101.1 overnight.


Infectious disease physician will also be consulted. 


Medication bag at bedside reviewed and plan of care discussed with Her brother.





REVIEW OF SYSTEMS: 


12 point ROS reviewed with patient. Pertinent positives mentioned above. 

Otherwise negative.





PHYSICAL EXAM: 


GENERAL: alert, weak, awake oriented x 3


HEENT: EOMI, Sclera non icteric, moist mucosa 


NECK: Supple, no JVD, trachea midline 


LUNGS: Clear breath sounds bilaterally. No wheezes


HEART: Regular rate and rhythm. Normal S1 and S2, without murmurs 


ABD: Abdomen soft but tender on RUQ. Bowel sounds present, RUQ is swollen, 

negative for ischemia or peritonitis


EXT: No clubbing cyanosis or edema


NEURO: Alert and oriented to person, follows commands





                             Vital Signs (last 8hr)








  Date Time  Temp Pulse Resp B/P (MAP) Pulse Ox O2 Delivery O2 Flow Rate FiO2


 


12/11/24 10:22 100.6 61 13 131/42 97 Nasal Cannula* 2.0 N/A











LABS:





                                Hematology Labs:








Test


 12/11/24


06:53 Range/Units


 


 


White Blood Count 12.7 H 4.8-10.8  K/uL


 


Red Blood Count


 2.80 L


 4.00-5.50


MIL/uL


 


Hemoglobin 9.7 L 12.0-16.0  g/dL


 


Hematocrit 28.9 L 36-48  %


 


Mean Corpuscular Volume 103.2 H 79-99  fL


 


Mean Corpuscular Hemoglobin 34.6 H 27.0-33.0  pg


 


Mean Corpuscular Hemoglobin


Concent 33.6 


 32.0-36.0  g/dL





 


Red Cell Distribution Width 12.4  11.0-15.5  %


 


Platelet Count 141  130-400  K/uL


 


Mean Platelet Volume 10.3  7.5-10.5  fL


 


Immature Granulocyte % (Auto) 0.6  0-1  %


 


Neutrophils (%) (Auto) 77.3 H 40.0-77.0  %


 


Lymphocytes (%) (Auto) 10.7 L 21.0-51.0  %


 


Monocytes (%) (Auto) 11.0  3.0-13.0  %


 


Eosinophils (%) (Auto) 0.2  0.0-8.0  %


 


Basophils (%) (Auto) 0.2  0.0-5.0  %


 


Neutrophils # (Auto) 9.8 H 1.8-7.7  K/uL


 


Lymphocytes # (Auto) 1.4  1.0-4.8  K/uL


 


Monocytes # (Auto) 1.4 H 0.1-1.0  K/uL


 


Eosinophils # (Auto) 0.03  0.00-0.70  K/uL


 


Basophils # (Auto) 0.02  0.00-0.20  K/uL


 


Absolute Immature Granulocyte


(auto 0.08 


 0-1  K/uL





 


Nucleated Red Blood Cells 0.0  0.0-0.19  %








                                 Chemistry Labs:








Test


 12/11/24


06:53 12/11/24


01:03 12/10/24


21:40 Range/Units


 


 


Sodium Level 138    136-145  mmol/L


 


Potassium Level 4.0    3.5-5.1  mmol/L


 


Chloride Level 101    101-111  mmol/L


 


Carbon Dioxide Level 29    21-32  mmol/L


 


Blood Urea Nitrogen 38 H   7-18  mg/dL


 


Creatinine 4.3 H   0.5-1.0  mg/dL


 


Glomerular Filtration Rate


Calc 10 


 


 


 >90  mL/min





 


Random Glucose 116 H     mg/dL


 


Total Calcium 8.0 L   8.5-10.1  mg/dL


 


Ionized Calcium


 1.01 L


 


 


 1.16-1.32


MMOL/L


 


Phosphorus Level 5.1 H   2.5-4.9  mg/dL


 


Magnesium Level


 1.80 


 


 


 1.80-2.40


mg/dL


 


Total Bilirubin 1.0    0.2-1.0  mg/dL


 


Direct Bilirubin 0.6 H   0.0-0.3  mg/dL


 


Aspartate Amino Transf


(AST/SGOT) 34 


 


 


 10-37  U/L





 


Alanine Aminotransferase


(ALT/SGPT) 48 


 


 


 12-78  U/L





 


Alkaline Phosphatase 243 H     U/L


 


Total Protein 6.7    6.0-8.3  g/dL


 


Albumin 2.5 L   3.5-5.0  g/dL


 


Lactic Acid Level  0.7 L  0.8-2.5  mmol/L


 


Troponin I High Sensitivity   29  4-50  ng/L


 


B-Type Natriuretic Peptide   1430 H 0-100  pg/mL


 


Lipase   40  16-77  U/L


 


Procalcitonin   23.66 H 0.05-0.5  ng/mL











DIAGNOSTICS / RADIOLOGY RESULTS:


MRCP(ABDWWO)CHOLANGIOPANCREATO





REASON: R/O ASCENDING CHOLANGITIS





COMPARISON: CT abdomen and pelvis 12/10/2024





TECHNIQUE: Routine imaging protocol was performed. Images were also


obtained pre and postgadolinium contrast infusion, 20 cc Clariscan IV. 





FINDINGS: 





There is a 3 x 3.4 cm fluid collection in the anterior segment of the


right lobe of the liver, relatively high under the diaphragm. This has a


short air-fluid level. There is peripheral contrast enhancement.


Findings are consistent with a hepatic abscess.





There are no other focal liver lesions. Portal and hepatic veins appear


patent. Gallbladder is absent. There is a small amount of air in the


biliary tree, probably from previous S sphincterotomy. Intrahepatic


biliary tree does not appear distended. Common duct measures between 4


and 5 mm near the head of the pancreas.





There is renal cortical thinning moderate in degree. There are small


bilateral renal cysts. Spleen and pancreas appear normal. Pancreatic


duct is not dilated. There are no focal fluid collections. There is no


free air or fluid. Anterior abdominal wall appears intact.





IMPRESSION: 


  


1. 3 x 3.4 cm focal fluid collection in the anterior segment right lobe


of the liver, with an air-fluid level, consistent with abscess.


2. Absent gallbladder


3. Moderate bilateral renal cortical thinning, there are also bilateral


renal cysts.








PLAN 





NEURO: 


Minimize central acting medications as possible. 


Maintain fall precautions, adequate lighting during the day





PULMONARY: 


Supplemental 02 as needed. 


Maintain aspiration precautions at all times


IS , nebs prn 





CARDIOVASCULAR: 


Follow hemodynamics. 


Vital signs per facility protocol


Several anti HTN medications at bedside, will reconcile into system . 


Hydralazine Iv prn for now. 





GI & NUTRITION:


Continue with nutritional support.- Clear liquid diet


Consult General surgery and GI regarding Liver abscess. 


Ir for drainage 


Continue stool softeners and laxatives as needed.





KIDNEYS & ELECTROLYTES: 


HD per nephrology recs 





ENDOCRINE:


Maintain blood glucose between 100-180 at all times.


Hypoglycemia protocol in place





INFECTIOUS DISEASE: 


Trend temperature, WBC and procalcitonin level- zosyn 


Follow cultures, deescalate antibiotics as soon as possible.


Panculture if new onset fever





ONCOLOGY/HEMATOLOGY/COAGULATION: 


Monitor for s/s of bleeding


Monitor hemoglobin, coagulation studies as needed





SKIN:


Pressure ulcer prevention per facility protocol


Specialty mattress





ORTHO/REHAB:


Continue PT/OT 





Prophylaxis:


Continue GI and DVT prophylaxis





Code Status: 


Full Resuscitation





Disposition:


TBD





Other:


Total patient care time :  45 minutes











ALON GOMEZ              Dec 11, 2024 16:29

## 2024-12-11 NOTE — EKG
[facility]

                                       

Test Date:    2024-12-10               Test Time:    21:41:52

Pat Name:     BELLA SEGUNDO              Department:   [facility]

Patient ID:   [id number]           Room:         330

Gender:       F                        Technician:   [id number]

:          1950               Requested By: BONNIE THOMPSON

Order Number: [id number]        Reading MD:   Elder Sprague

                                 Measurements

Intervals                              Axis          

Rate:         73                       P:            23

SC:           130                      QRS:          21

QRSD:         91                       T:            84

QT:           414                                    

QTc:          450                                    

                           Interpretive Statements

Sinus rhythm

Atrial premature complex

Low voltage, extremity leads

Compared to ECG 2024 16:45:54

Atrial premature complex(es) now present

Ventricular premature complex(es) no longer present

Myocardial infarct finding no longer present

Electronically Signed On 2024 14:00:07 CST by Elder Sprague



Please click the below link to view image of tracing.

## 2024-12-12 VITALS
TEMPERATURE: 98.1 F | SYSTOLIC BLOOD PRESSURE: 122 MMHG | RESPIRATION RATE: 16 BRPM | DIASTOLIC BLOOD PRESSURE: 51 MMHG | HEART RATE: 66 BPM

## 2024-12-12 VITALS
TEMPERATURE: 98.5 F | RESPIRATION RATE: 18 BRPM | SYSTOLIC BLOOD PRESSURE: 136 MMHG | DIASTOLIC BLOOD PRESSURE: 55 MMHG | HEART RATE: 61 BPM

## 2024-12-12 VITALS — OXYGEN SATURATION: 94 %

## 2024-12-12 VITALS — HEART RATE: 67 BPM | RESPIRATION RATE: 18 BRPM | OXYGEN SATURATION: 93 %

## 2024-12-12 VITALS
TEMPERATURE: 97.9 F | RESPIRATION RATE: 16 BRPM | SYSTOLIC BLOOD PRESSURE: 130 MMHG | DIASTOLIC BLOOD PRESSURE: 48 MMHG | HEART RATE: 64 BPM

## 2024-12-12 VITALS — SYSTOLIC BLOOD PRESSURE: 129 MMHG | DIASTOLIC BLOOD PRESSURE: 48 MMHG | HEART RATE: 71 BPM | RESPIRATION RATE: 16 BRPM

## 2024-12-12 VITALS
HEART RATE: 71 BPM | DIASTOLIC BLOOD PRESSURE: 48 MMHG | SYSTOLIC BLOOD PRESSURE: 124 MMHG | TEMPERATURE: 98.4 F | RESPIRATION RATE: 18 BRPM

## 2024-12-12 VITALS — RESPIRATION RATE: 16 BRPM | SYSTOLIC BLOOD PRESSURE: 132 MMHG | HEART RATE: 69 BPM | DIASTOLIC BLOOD PRESSURE: 86 MMHG

## 2024-12-12 VITALS
HEART RATE: 84 BPM | SYSTOLIC BLOOD PRESSURE: 145 MMHG | DIASTOLIC BLOOD PRESSURE: 48 MMHG | TEMPERATURE: 98.1 F | RESPIRATION RATE: 16 BRPM

## 2024-12-12 VITALS — SYSTOLIC BLOOD PRESSURE: 144 MMHG | HEART RATE: 68 BPM | RESPIRATION RATE: 16 BRPM | DIASTOLIC BLOOD PRESSURE: 49 MMHG

## 2024-12-12 VITALS
RESPIRATION RATE: 16 BRPM | HEART RATE: 62 BPM | DIASTOLIC BLOOD PRESSURE: 50 MMHG | TEMPERATURE: 98.1 F | SYSTOLIC BLOOD PRESSURE: 129 MMHG

## 2024-12-12 VITALS — SYSTOLIC BLOOD PRESSURE: 130 MMHG | HEART RATE: 70 BPM | DIASTOLIC BLOOD PRESSURE: 83 MMHG

## 2024-12-12 VITALS
TEMPERATURE: 98.1 F | DIASTOLIC BLOOD PRESSURE: 47 MMHG | SYSTOLIC BLOOD PRESSURE: 130 MMHG | RESPIRATION RATE: 19 BRPM | HEART RATE: 72 BPM

## 2024-12-12 VITALS — SYSTOLIC BLOOD PRESSURE: 125 MMHG | DIASTOLIC BLOOD PRESSURE: 66 MMHG

## 2024-12-12 VITALS — SYSTOLIC BLOOD PRESSURE: 130 MMHG | RESPIRATION RATE: 16 BRPM | DIASTOLIC BLOOD PRESSURE: 44 MMHG | HEART RATE: 66 BPM

## 2024-12-12 VITALS — DIASTOLIC BLOOD PRESSURE: 48 MMHG | SYSTOLIC BLOOD PRESSURE: 140 MMHG | HEART RATE: 77 BPM

## 2024-12-12 VITALS — HEART RATE: 69 BPM | RESPIRATION RATE: 16 BRPM | DIASTOLIC BLOOD PRESSURE: 50 MMHG | SYSTOLIC BLOOD PRESSURE: 137 MMHG

## 2024-12-12 VITALS — SYSTOLIC BLOOD PRESSURE: 137 MMHG | DIASTOLIC BLOOD PRESSURE: 52 MMHG | HEART RATE: 67 BPM

## 2024-12-12 VITALS — HEART RATE: 69 BPM | OXYGEN SATURATION: 93 % | RESPIRATION RATE: 18 BRPM

## 2024-12-12 VITALS
DIASTOLIC BLOOD PRESSURE: 42 MMHG | RESPIRATION RATE: 16 BRPM | TEMPERATURE: 97.9 F | SYSTOLIC BLOOD PRESSURE: 128 MMHG | HEART RATE: 60 BPM

## 2024-12-12 VITALS
HEART RATE: 68 BPM | SYSTOLIC BLOOD PRESSURE: 126 MMHG | DIASTOLIC BLOOD PRESSURE: 37 MMHG | TEMPERATURE: 99.2 F | RESPIRATION RATE: 19 BRPM

## 2024-12-12 VITALS — SYSTOLIC BLOOD PRESSURE: 130 MMHG | DIASTOLIC BLOOD PRESSURE: 52 MMHG | HEART RATE: 74 BPM

## 2024-12-12 VITALS — OXYGEN SATURATION: 100 %

## 2024-12-12 VITALS — DIASTOLIC BLOOD PRESSURE: 44 MMHG | RESPIRATION RATE: 16 BRPM | HEART RATE: 67 BPM | SYSTOLIC BLOOD PRESSURE: 126 MMHG

## 2024-12-12 VITALS — SYSTOLIC BLOOD PRESSURE: 124 MMHG | RESPIRATION RATE: 16 BRPM | HEART RATE: 73 BPM | DIASTOLIC BLOOD PRESSURE: 66 MMHG

## 2024-12-12 VITALS — RESPIRATION RATE: 16 BRPM | DIASTOLIC BLOOD PRESSURE: 45 MMHG | SYSTOLIC BLOOD PRESSURE: 109 MMHG | HEART RATE: 72 BPM

## 2024-12-12 VITALS — SYSTOLIC BLOOD PRESSURE: 135 MMHG | DIASTOLIC BLOOD PRESSURE: 50 MMHG | RESPIRATION RATE: 16 BRPM | HEART RATE: 66 BPM

## 2024-12-12 VITALS — DIASTOLIC BLOOD PRESSURE: 50 MMHG | SYSTOLIC BLOOD PRESSURE: 134 MMHG | HEART RATE: 60 BPM

## 2024-12-12 VITALS — DIASTOLIC BLOOD PRESSURE: 51 MMHG | HEART RATE: 67 BPM | RESPIRATION RATE: 16 BRPM | SYSTOLIC BLOOD PRESSURE: 123 MMHG

## 2024-12-12 VITALS — DIASTOLIC BLOOD PRESSURE: 47 MMHG | SYSTOLIC BLOOD PRESSURE: 142 MMHG | HEART RATE: 84 BPM

## 2024-12-12 VITALS — TEMPERATURE: 99.5 F

## 2024-12-12 LAB
ALBUMIN SERPL-MCNC: 2.4 G/DL (ref 3.5–5)
ALT SERPL-CCNC: 52 U/L (ref 12–78)
APPEARANCE SPUN FLD: (no result)
APTT PPP: 28.2 SEC (ref 26.3–35.5)
AST SERPL-CCNC: 45 U/L (ref 10–37)
BASOPHILS # BLD AUTO: 0.03 K/UL (ref 0–0.2)
BASOPHILS NFR BLD AUTO: 0.2 % (ref 0–5)
BILIRUB SERPL-MCNC: 0.9 MG/DL (ref 0.2–1)
BODY FLD TYPE: (no result)
BUN SERPL-MCNC: 53 MG/DL (ref 7–18)
CELL CNT PNL FLD: 100
CHLORIDE SERPL-SCNC: 100 MMOL/L (ref 101–111)
CO2 SERPL-SCNC: 25 MMOL/L (ref 21–32)
COLOR FLD: (no result)
CREAT SERPL-MCNC: 5.5 MG/DL (ref 0.5–1)
EOSINOPHIL # BLD AUTO: 0.05 K/UL (ref 0–0.7)
EOSINOPHIL NFR BLD AUTO: 0.4 % (ref 0–8)
ERYTHROCYTE [DISTWIDTH] IN BLOOD BY AUTOMATED COUNT: 12.3 % (ref 11–15.5)
GFR SERPL CREATININE-BSD FRML MDRD: 8 ML/MIN (ref 90–?)
GLUCOSE SERPL-MCNC: 120 MG/DL (ref 70–105)
HCT VFR BLD AUTO: 29.5 % (ref 36–48)
IMM GRANULOCYTES # BLD: 0.09 K/UL (ref 0–1)
INR PPP: 1.08 (ref 0.85–1.15)
LYMPHOCYTES # SPEC AUTO: 0.8 K/UL (ref 1–4.8)
LYMPHOCYTES NFR BLD MANUAL: 21 %
LYMPHOCYTES NFR SPEC AUTO: 6.5 % (ref 21–51)
MAGNESIUM SERPL-MCNC: 2.1 MG/DL (ref 1.8–2.4)
MCH RBC QN AUTO: 34.6 PG (ref 27–33)
MCHC RBC AUTO-ENTMCNC: 33.2 G/DL (ref 32–36)
MCV RBC AUTO: 104.2 FL (ref 79–99)
MONOCYTES # BLD AUTO: 1.2 K/UL (ref 0.1–1)
MONOCYTES NFR BLD AUTO: 9.6 % (ref 3–13)
NEUTROPHILS # BLD AUTO: 10.1 K/UL (ref 1.8–7.7)
NEUTROPHILS NFR BLD AUTO: 82.6 % (ref 40–77)
NEUTROPHILS NFR FLD: 79 %
NRBC BLD MANUAL-RTO: 0 % (ref 0–0.19)
PHOSPHATE SERPL-MCNC: 6.9 MG/DL (ref 2.5–4.9)
PLATELET # BLD AUTO: 153 K/UL (ref 130–400)
POTASSIUM SERPL-SCNC: 4.7 MMOL/L (ref 3.5–5.1)
PROT SERPL-MCNC: 6.7 G/DL (ref 6–8.3)
PROTHROMBIN TIME: 11.6 SEC (ref 9.6–11.6)
RBC # BLD AUTO: 2.83 MIL/UL (ref 4–5.5)
SODIUM SERPL-SCNC: 137 MMOL/L (ref 136–145)
SPECIMEN VOL FLD: 9 ML
TSH SERPL DL<=0.05 MIU/L-ACNC: 1.11 UIU/ML (ref 0.36–3.74)
WBC # BLD AUTO: 12.2 K/UL (ref 4.8–10.8)

## 2024-12-12 PROCEDURE — 0F9130Z DRAINAGE OF RIGHT LOBE LIVER WITH DRAINAGE DEVICE, PERCUTANEOUS APPROACH: ICD-10-PCS

## 2024-12-12 PROCEDURE — 5A1D70Z PERFORMANCE OF URINARY FILTRATION, INTERMITTENT, LESS THAN 6 HOURS PER DAY: ICD-10-PCS | Performed by: INTERNAL MEDICINE

## 2024-12-12 RX ADMIN — SODIUM CHLORIDE ONE MLS/HR: 0.9 INJECTION, SOLUTION INTRAVENOUS at 06:11

## 2024-12-12 RX ADMIN — POLYETHYLENE GLYCOL 3350, SODIUM SULFATE ANHYDROUS, SODIUM BICARBONATE, SODIUM CHLORIDE, POTASSIUM CHLORIDE ONE ML: 236; 22.74; 6.74; 5.86; 2.97 POWDER, FOR SOLUTION ORAL at 17:25

## 2024-12-12 RX ADMIN — DOXAZOSIN MESYLATE SCH MG: 2 TABLET ORAL at 21:22

## 2024-12-12 RX ADMIN — ASCORBIC ACID, FOLIC ACID, NIACIN, THIAMINE, RIBOFLAVIN, PYRIDOXINE, CYANOCOBALAMIN, PANTOTHENIC ACID, BIOTIN SCH CAP: 100; 150; 6; 1; 20; 5; 10; 1.7; 1.5 CAPSULE, LIQUID FILLED ORAL at 09:11

## 2024-12-12 RX ADMIN — SODIUM CHLORIDE SCH MG: 9 INJECTION, SOLUTION INTRAVENOUS at 17:25

## 2024-12-12 NOTE — PN
SUBJECTIVE:  This patient has been evaluated and seen for dialysis and seen 

several times.  The patient has no fever, chills or rigors.  No cough, 

expectoration, or hemoptysis.  Has shortness of breath.  The patient has a liver

abscess and drain placement planned.  No other associated finding.  No other 

aggravating or relieving factors.  The patient is weak.  Otherwise unchanged 

review.



PHYSICAL EXAMINATION:

GENERAL:  Pale, no other distress or deformities, lying in bed.

VITAL SIGNS:  Blood pressure has been 130/52, pulse 74, respiratory rate is 18, 

afebrile.

HEENT:  Head is atraumatic.  Pupils are round and reactive.  Sclerae are 

anicteric.  Conjunctivae not pale.  Oral mucosa is not dry.

NECK:  Without masses or bruits.  Thyroid is palpable.  Neck has no bruits.



LABORATORY DATA:  Labs have been reviewed.  Old records reviewed.  Imaging 

studies are personally reviewed.



PLAN:  To continue dialysis support.  Continue monitoring of renal function.  

Continued monitoring of electrolytes.  We will be monitoring the overall status 

closely.  Overall condition is poor.  The patient was evaluated and seen for 

dialysis multiple times.



Thank you for this patient.







DD: 12/12/2024 08:25 PM

DT: 12/12/2024 08:32 PM

TID: [id number] RECEIPT: [id number]

## 2024-12-12 NOTE — CONS
INFECTIOUS DISEASE CONSULTATION



REQUESTING PHYSICIAN: Larisa Batres.



REASON FOR CONSULTATION:  Liver abscess.



HISTORY OF PRESENT ILLNESS:  A 74-year-old female with obesity, hypertension, 

and diabetes mellitus, who presented to the hospital with abdominal pain, 

generalized body weakness.  The patient also complained of fever.  Pain 

localized to the right upper quadrant.  Initial CT scan showed possible mass.  

MRCP was done, which confirmed abscess to the right lobe of  the liver.  WBC on 

admission was 14,000.



PAST MEDICAL HISTORY:

1.  ESRD.

2.  Hypertension.

3.  Dyslipidemia.

4.  Diabetes mellitus.

5.  Obesity.



PAST SURGICAL HISTORY:

1.  AV fistula surgery.

2.  Cholecystectomy.



ALLERGIES:  No known drug allergies.



CURRENT MEDICATIONS:  Reviewed.



SOCIAL HISTORY:  No alcohol, tobacco, or illicit drug use.



FAMILY HISTORY:  Positive for diabetes mellitus.



REVIEW OF SYSTEMS:  Greater than 10 systems were reviewed, negative except as 

documented above.



PHYSICAL EXAMINATION:

GENERAL:  Elderly female, awake.

VITAL SIGNS:  Temperature 98.4, pulse 67, respirations 16, /51.

EYES:  No icterus.  Pupils equal and reactive.

HENT:  No oral thrush seen.  Moist oral mucosa.

NECK:  Supple. No JVD or thyromegaly.

LUNGS:  Good air entry.  No rales, no rhonchi.

CARDIOVASCULAR SYSTEM:  S1, S2 regular.  No murmur heard.

ABDOMEN:  Obese, soft.  Bowel sounds present.  Tenderness to the right upper 

quadrant.

CENTRAL NERVOUS SYSTEM:  Awake, alert, oriented x 3.  No focal deficits.

SKIN:  No rashes, no itchiness.

LYMPHATIC:  No peripheral lymphadenopathy.

BACK:  No deformity, no pressure ulcer.

MUSCULOSKELETAL:  No joint swelling, erythema, or tenderness.



LABORATORY DATA:  Sodium 137, potassium 4.7, BUN 53, creatinine 5.1.  WBC 12.2, 

hemoglobin 9.3, platelets 153.  Blood culture: No growth for one day.



RADIOLOGY:  MRI of the abdomen shows a 3.5 cm fluid collection in the right lobe

of the liver.



ASSESSMENT:  A 74-year-old female presenting with abdominal pain and fever.  

Current problems include:

1.  _____ gram-negative sepsis.

2.  Liver abscess.

3.  End-stage renal disease, on dialysis.

4.  Obesity.

5.  Hypertension.

6.  Diabetes mellitus.

7.  Anemia.



PLAN:

1.  Continue Zosyn.

2.  Continue vancomycin.

3.  The patient will need percutaneous drainage of abscess.

4.  Continue dialysis.

5.  Continue pain management.

6.  Follow up cultures.

7.  Monitor electrolytes.

8.  The patient will be followed up closely.



Thank you for allowing me to participate in the care of this patient.







DD: 12/12/2024 06:13 PM

DT: 12/12/2024 07:31 PM

TID: [id number] RECEIPT: [id number]

## 2024-12-12 NOTE — PRN
US PERC DRN, LIVER W IMG IR





REASON: RT HEPATIC FLUID COLLECTION abscess





COMPARISON: Previous CT 12/10/2024. 





TECHNIQUE: Ultrasound-guided drain placement within right lobe hepatic abscess. 





FINDINGS: Ultrasound demonstrates complex fluid collection within the right lobe

of the liver with air foci present.





PROCEDURE:





Informed consent obtained from the patient following explanation of risk, 

benefits, complications.  Timeout performed by radiology nursing staff.  Right 

abdomen was prepped and draped in sterile fashion.  Patient received intravenous

titrated doses of Versed and fentanyl administered by radiology nursing 

personnel with continuous monitoring of the oxygen saturation, chronic status, 

respiratory status.  10 mL of 1% lidocaine subcutaneous utilized.  Under direct 

ultrasound guidance, access gained into the abscess collection utilizing 18-

gauge needle.  Purulent fluid was aspirated and submitted for Gram stain and 

cultures.  Guidewire was placed and visualized on ultrasound.  Fascial 

dilatation performed, 8 Haitian drain was placed within the fluid collection 

under direct ultrasound guidance.  Aspirate yielded purulent fluid.  Specimen 

submitted for Gram stain and cultures.  Catheter connected to external drainage 

bag.  Retention suture and sterile dressing applied.  Patient tolerated 

procedure well without evidence of complication.





IMPRESSION: 


  Ultrasound-guided drain placement within right lobe hepatic abscess.  Cultures

pending.











FELIPE WHITFIELD DO           Dec 12, 2024 16:14

## 2024-12-12 NOTE — PN
SUBJECTIVE:  The patient has been evaluated and seen for dialysis, seen several 

times.  The patient is critically ill.  No other localizing finding.  No fever, 

chills, or rigors.  No cough, expectoration, or hemoptysis.  No other associated

finding.  No other aggravating or relieving factors.  The patient has 

generalized weakness.  The patient is critically ill with these problems.



PHYSICAL EXAMINATION:

GENERAL:  Pale, no other distress or deformities, lying in bed.

VITAL SIGNS:  Blood pressure is 130/83, pulse 70, respiratory rate is 18.

HEENT:  Head is atraumatic.  Pupils are round and reactive.  Sclerae are 

anicteric.  Conjunctivae not pale.  Oral mucosa is not dry.



LABORATORY DATA:  Have been reviewed.  Old records have been reviewed.



PROBLEMS:  Renal failure, anemia, multiple other comorbidities.



PLAN:  To continue monitoring of renal function.  Continued monitoring of 

electrolytes.  The patient has been evaluated and seen for dialysis and seen 

several times today.  Overall, condition remained guarded.  I have discussed 

with other team members.



Thank you for this patient.







DD: 12/12/2024 08:16 PM

DT: 12/12/2024 08:22 PM

TID: [id number] RECEIPT: [id number]

## 2024-12-12 NOTE — PN
GASTROENTEROLOGY PROGRESS NOTE








Date of Visit: Dec 12, 2024 


Time of Visit: 10:14





Events / Notes:


No acute events overnight.  Patient underwent IR drainage of liver abscess.





Review of Systems:


CONSTITUTIONAL: No malaise or change in sensation of wellbeing.


ENMT: No rhinorrhea, otorrhea, sinus pain, ear ache.


CARDIOVASCULAR: No angina, palpitations, orthopnea or paroxysmal dyspnea.


RESPIRATORY: No SOB.


GASTROINTESTINAL: No abdominal pain, nausea, vomiting, diarrhea, hematemesis, 

melena or change in the patient's habitual bowel movements consistency/number.


GENITOURINARY: No dysuria, hematuria or change in bladder continence.


MUSCULOSKELETAL: No new muscle pain or decrease in muscular strength. No new 

joint swelling, redness or tenderness.


SKIN: No new rash.





Physical Exam:


GEN: Awake, alert, oriented in person, time and place, and in no acute distress.


HEENT: No sinus tenderness. Tympanic membranes were not examined. No rhinorrhea.

Oral pharyngeal mucosa is pink, moist and within normal limits. Neck is supple 

with no cervical lymphadenopathy, thyromegaly or JVD.


CHEST: Inspection, palpation and percussion of the chest were unremarkable. Lung

auscultation revealed normal breath sounds bilaterally.


CARDIAC: PMI is within normal limits. Heart sounds are regular. Normal S1, S2. 

No gallop or murmur.


ABD: Soft, non-tender and not distended. No peritoneal signs on palpation. No 

organomegaly. Normal bowel sounds.


EXT: No cyanosis or clubbing. No edema.


SKIN: Intact. No rashes.


JOINTS: No evidence of synovitis or acute arthritis.


NEURO: Alert and oriented to name, place and person. Cranial nerve examination 

is unremarkable. No focal motor deficits. Normal speech. Gait is normal. 

Strength is normal.





                             Vital Signs (last 8hr)








  Date Time  Temp Pulse Resp B/P (MAP) Pulse Ox O2 Delivery O2 Flow Rate FiO2


 


12/12/24 09:29 98.1 62 16 129/50  Room Air  


 


12/12/24 09:15 98.1 66 16 122/51  Room Air  


 


12/12/24 09:00  67 16 123/51  Room Air  


 


12/12/24 08:45  69 16 132/86  Room Air  


 


12/12/24 08:30  67 16 126/44  Room Air  


 


12/12/24 08:15  72 16 109/45  Room Air  


 


12/12/24 08:00  69 16 127/48  Room Air  


 


12/12/24 08:00 98.4 71 18 124/48 94 Room Air  


 


12/12/24 07:45  73 16 124/66  Room Air  


 


12/12/24 07:30  66 16 135/50  Room Air  


 


12/12/24 07:23  69 18   N/Cannula Low lpm 2.0 28


 


12/12/24 07:15  69 16 137/50  Room Air  


 


12/12/24 07:00  71 16 129/48  Room Air  


 


12/12/24 06:45  68 16 144/49  Room Air  


 


12/12/24 06:30  66 16 130/44  Room Air  


 


12/12/24 06:15 97.9 64 16 130/48  Room Air  


 


12/12/24 05:50 97.9 60 16 128/42  Room Air  


 


12/12/24 03:00 99.1 68 19 126/37 96 Room Air  











Laboratory:


[ ]








                                   Laboratory:








Test


 12/12/24


05:09 12/12/24


03:54 12/11/24


06:53 12/11/24


01:03 Range/Units


 


 


Whole Blood Glucose 127 H      MG/DL


 


White Blood Count  12.2 H   4.8-10.8  K/uL


 


Red Blood Count


 


 2.83 L


 


 


 4.00-5.50


MIL/uL


 


Hemoglobin  9.8 L   12.0-16.0  g/dL


 


Hematocrit  29.5 L   36-48  %


 


Mean Corpuscular Volume  104.2 H   79-99  fL


 


Mean Corpuscular Hemoglobin  34.6 H   27.0-33.0  pg


 


Mean Corpuscular Hemoglobin


Concent 


 33.2 


 


 


 32.0-36.0  g/dL





 


Red Cell Distribution Width  12.3    11.0-15.5  %


 


Platelet Count  153    130-400  K/uL


 


Mean Platelet Volume  10.8 H   7.5-10.5  fL


 


Immature Granulocyte % (Auto)  0.7    0-1  %


 


Neutrophils (%) (Auto)  82.6 H   40.0-77.0  %


 


Lymphocytes (%) (Auto)  6.5 L   21.0-51.0  %


 


Monocytes (%) (Auto)  9.6    3.0-13.0  %


 


Eosinophils (%) (Auto)  0.4    0.0-8.0  %


 


Basophils (%) (Auto)  0.2    0.0-5.0  %


 


Neutrophils # (Auto)  10.1 H   1.8-7.7  K/uL


 


Lymphocytes # (Auto)  0.8 L   1.0-4.8  K/uL


 


Monocytes # (Auto)  1.2 H   0.1-1.0  K/uL


 


Eosinophils # (Auto)  0.05    0.00-0.70  K/uL


 


Basophils # (Auto)  0.03    0.00-0.20  K/uL


 


Absolute Immature Granulocyte


(auto 


 0.09 


 


 


 0-1  K/uL





 


Nucleated Red Blood Cells  0.0    0.0-0.19  %


 


Sodium Level  137    136-145  mmol/L


 


Potassium Level  4.7    3.5-5.1  mmol/L


 


Chloride Level  100 L   101-111  mmol/L


 


Carbon Dioxide Level  25    21-32  mmol/L


 


Blood Urea Nitrogen  53 H   7-18  mg/dL


 


Creatinine  5.5 H   0.5-1.0  mg/dL


 


Glomerular Filtration Rate


Calc 


 8 


 


 


 >90  mL/min





 


Random Glucose  120 H     mg/dL


 


Total Calcium  8.0 L   8.5-10.1  mg/dL


 


Phosphorus Level  6.9 H   2.5-4.9  mg/dL


 


Magnesium Level


 


 2.10 


 


 


 1.80-2.40


mg/dL


 


Total Bilirubin  0.9    0.2-1.0  mg/dL


 


Aspartate Amino Transf


(AST/SGOT) 


 45 H


 


 


 10-37  U/L





 


Alanine Aminotransferase


(ALT/SGPT) 


 52 


 


 


 12-78  U/L





 


Alkaline Phosphatase  252 H     U/L


 


Total Protein  6.7    6.0-8.3  g/dL


 


Albumin  2.4 L   3.5-5.0  g/dL


 


Thyroid Stimulating Hormone


(TSH) 


 1.11 #


 


 


 0.36-3.74


uIU/mL


 


Hemoglobin A1c   5.4   4.0-6.0  %


 


Estimated Average Glucose


(eAG) 


 


 108 


 


   mg/dL





 


Ionized Calcium


 


 


 1.01 L


 


 1.16-1.32


MMOL/L


 


Direct Bilirubin   0.6 H  0.0-0.3  mg/dL


 


Tumor Marker Alpha Fetoprotein   1.8   0.0-9.2  ng/mL


 


Carcinoembryonic Antigen   2.3   0.0-4.7  ng/mL


 


CA 19-9 Antigen   4   0-35  U/mL


 


 Antigen   8.5   0.0-38.1  U/mL


 


Lactic Acid Level    0.7 L 0.8-2.5  mmol/L


 


Test


 12/10/24


22:15 12/10/24


21:40 12/10/24


21:17 


 Range/Units


 


 


Urine Color YELLOW     YELLOW  


 


Urine Appearance CLOUDY H    CLEAR  


 


Urine pH 6.0     5.0-8.0  


 


Urine Specific Gravity 1.017     1.001-1.031  


 


Urine Protein 600 H    NEGATIVE  mg/dL


 


Urine Glucose (UA) 200 H    NEGATIVE  mg/dL


 


Urine Ketones NEGATIVE     NEGATIVE  mg/dL


 


Urine Occult Blood SMALL H    NEGATIVE  


 


Urine Nitrate NEGATIVE     NEGATIVE  


 


Urine Bilirubin 0.5 H    NEGATIVE  mg/dL


 


Urine Urobilinogen 2.0 H    0.2-1.0  mg/dL


 


Urine Leukocyte Esterase


 NEGATIVE 


 


 


 


 NEGATIVE


Erika/uL


 


Urine RBC 0-1     0-1  /HPF


 


Urine WBC 2-5 H    0-1  /HPF


 


Urine Squamous Epithelial


Cells RARE 


 


 


 


 0-2  /HPF





 


Urine Bacteria RARE     None Seen  /HPF


 


Troponin I High Sensitivity  29    4-50  ng/L


 


B-Type Natriuretic Peptide  1430 H   0-100  pg/mL


 


Lipase  40    16-77  U/L


 


Procalcitonin  23.66 H   0.05-0.5  ng/mL


 


Influenza Type A Antigen


 


 


 Negative For


Type A 


 NEGATIVE  





 


Influenza Type B Antigen


 


 


 Negative For


Type B 


 NEGATIVE  





 


SARS-CoV-2 Antigen (Rapid)


 


 


 PRESUMPTIVE


NEGATIVE 


 NEGATIVE  





 


Group A Streptococcus Rapid   negative   NEGATIVE  











                               Current Medications








 Medications


  (Trade)  Dose


 Ordered  Sig/Adenike


 Route


 PRN Reason  Start Time


 Stop Time Status Last Admin


Dose Admin


 


 Acetaminophen


  (TYLenol 325MG


 TAB)  650 mg  Q6H  PRN


 PO


 FEVER/MILD PAIN LEVEL 1-3  12/11/24 00:30


 1/10/25 00:29  12/12/24 09:12


650 MG


 


 Albuterol Sulfate


  (Proventil


 0.083% 2.5mg/3ml)  2.5 mg  [id number]  PRN


 IH


 SHORTNESS OF BREATH  12/11/24 00:30


 1/10/25 00:29   





 


 Hydralazine HCl


  (APRESOLine 20MG


 INJ)  10 mg  Q6H  PRN


 IV


 SBP GREATER THAN 180  12/11/24 00:30


 1/10/25 00:29   





 


 Hydromorphone HCl


  (DiLAUDid 0.5MG


 INJ)  0.2 mg  Q4H  PRN


 IVP


 SEVERE PAIN (7-10)  12/11/24 00:30


 12/16/24 00:29  12/12/24 00:53


0.2 MG


 


 Labetalol HCl


  (TRANdate 20MG


 SYG)  10 mg  Q2H  PRN


 IV


 SBP GREATER THAN 180  12/11/24 00:30


 1/10/25 00:29   





 


 Lactulose


  (Constulose 20gm/


 30ml Udcup)  20 gm  Q6H  PRN


 PO


 CONSTIPATION  12/11/24 00:30


 1/10/25 00:29   





 


 Ondansetron HCl


  (zoFRAN 4MG INJ)  4 mg  Q6H  PRN


 IVP


 NAUSEA/VOMITING  12/11/24 00:30


 1/10/25 00:29   





 


 Pharmacy Profile


 Note


  (Pharmacy


 Communication)  1 each  ONCE


 MISC


   12/11/24 00:30


 12/11/24 00:26 DC  





 


 Piperacillin Sod/


 Tazobactam Sod


  (Zosyn


 3.375gm+NS 50ml)  3.375 gm  Q12H


 IV


   12/11/24 00:30


 12/21/24 00:29  12/12/24 00:46


3.375 GM


 


 Vancomycin HCl


  (Vancomycin


 750mg)  750 mg  TTHSPHD


 IVPB


   12/12/24 16:00


 12/22/24 15:59   





 


 Vancomycin HCl


  (Vancomycin


 Protocol)  1 each  AD


 IV


   12/11/24 00:30


 12/11/24 00:25 DC  





 


 Vancomycin HCl


  (Vancomycin


 Protocol)  1 each  AD


 IV


   12/11/24 00:30


 12/25/24 00:29   





 


 Vitamin B Complex/


 Vit C/Folic Acid


  (Nephrovite


 Tablet)  1 cap  DAILY


 PO


   12/12/24 09:00


 1/11/25 08:59  12/12/24 09:11


1 CAP











Diagnostics / Radiology:


[COPY/PASTE HERE IF NO REPORTS PLEASE DELETE SECTION]





Assessment:  


Liver abscess


Abdominal pain





Plan: 


Tentative plan for egd/colonoscopy in [facility]            Dec 12, 2024 10:14

## 2024-12-12 NOTE — PN
This is a 74-year-old female concerns of hepatic abscess





Interval history:


This 54-year-old female seen in her room resting


Patient remains NPO


White count relatively unchanged


Vitals stable


No acute events reported overnight





Physical exam


General: Awake alert and oriented


Heart: Regular rate and rhythm}


Lungs: Clear to auscultation no distress


Abdomen: [Soft, nontender, nondistended








Assessment :


This is a 74-year-old female with concerns of hepatic abscess





Plan:


At this point in time we will await for IR possible percutaneous drain placement


No immediate surgical intervention at this time


Dr. Herzog to be updated in patient's status


Nursing report any further acute events


Surgical team follow patient closely


Vitals/Labs





Vital Signs








  Date Time  Temp Pulse Resp B/P (MAP) Pulse Ox O2 Delivery O2 Flow Rate FiO2


 


12/12/24 09:29 98.1 62 16 129/50  Room Air  


 


12/12/24 08:00     94   


 


12/12/24 07:23       2.0 28





Laboratory Tests


12/12/24 03:54








Medications





Current Medications


Acetaminophen 650 mg ONCE  ONCE PO Last administered on 12/10/24at 22:29;  Start

 12/10/24 at 22:00;  Stop 12/10/24 at 22:01;  Status DC


Ondansetron HCl 4 mg ONCE  ONCE IVP Last administered on 12/10/24at 22:28;  

Start 12/10/24 at 22:00;  Stop 12/10/24 at 22:01;  Status DC


Ceftriaxone Sodium 2 gm ONCE  ONCE IVPB Last administered on 12/10/24at 22:28;  

Start 12/10/24 at 22:00;  Stop 12/10/24 at 22:01;  Status DC


Piperacillin Sod/ Tazobactam Sod 3.375 gm Q12H IV Last administered on 

12/12/24at 00:46;  Start 12/11/24 at 00:30;  Stop 12/21/24 at 00:29


Vancomycin HCl 1 each AD IV;  Start 12/11/24 at 00:30;  Stop 12/25/24 at 00:29


Pharmacy Profile Note 1 each ONCE MISC;  Start 12/11/24 at 00:30;  Stop 12/11/24

 at 00:26;  Status DC


Albuterol Sulfate 2.5 mg [id number]  PRN IH;  Start 12/11/24 at 00:30;  Stop 

1/10/25 at 00:29


Vancomycin HCl 1 each AD IV;  Start 12/11/24 at 00:30;  Stop 12/11/24 at 00:25; 

 Status DC


Acetaminophen 650 mg Q6H  PRN PO Last administered on 12/12/24at 09:12;  Start 

12/11/24 at 00:30;  Stop 1/10/25 at 00:29


Lactulose 20 gm Q6H  PRN PO;  Start 12/11/24 at 00:30;  Stop 1/10/25 at 00:29


Ondansetron HCl 4 mg Q6H  PRN IVP;  Start 12/11/24 at 00:30;  Stop 1/10/25 at 

00:29


Hydralazine HCl 10 mg Q6H  PRN IV;  Start 12/11/24 at 00:30;  Stop 1/10/25 at 

00:29


Labetalol HCl 10 mg Q2H  PRN IV;  Start 12/11/24 at 00:30;  Stop 1/10/25 at 

00:29


Hydromorphone HCl 0.2 mg Q4H  PRN IVP Last administered on 12/12/24at 00:53;  

Start 12/11/24 at 00:30;  Stop 12/16/24 at 00:29


Vancomycin HCl 250 ml @  125 mls/hr ONCE  ONCE IV Last administered on 

12/11/24at 01:29;  Start 12/11/24 at 01:30;  Stop 12/11/24 at 03:29;  Status DC


Vancomycin HCl 750 mg TTHSPHD IVPB;  Start 12/12/24 at 16:00;  Stop 12/22/24 at 

15:59


Acetaminophen 650 mg ONCE  ONCE RC Last administered on 12/11/24at 05:44;  Start

 12/11/24 at 06:00;  Stop 12/11/24 at 06:01;  Status DC


Gadoterate Meglumine 10 mmol STK-MED ONCE IV;  Start 12/11/24 at 13:44;  Stop 

12/11/24 at 13:45;  Status DC


Vitamin B Complex/ Vit C/Folic Acid 1 cap DAILY PO Last administered on 

12/12/24at 09:11;  Start 12/12/24 at 09:00;  Stop 1/11/25 at 08:59


Sodium Chloride 1,000 ml @  0 mls/hr ONCE  ONCE IV Last administered on 

12/12/24at 06:11;  Start 12/12/24 at 06:00;  Stop 12/12/24 at 06:01;  Status DC


Aspirin 81 mg DAILY PO;  Start 12/13/24 at 09:00;  Stop 1/12/25 at 08:59;  

Status UNV


Carvedilol 3.125 mg BID PO;  Start 12/12/24 at 21:00;  Stop 1/11/25 at 20:59;  

Status UNV


Cinacalcet 30 mg DAILY PO;  Start 12/13/24 at 09:00;  Stop 1/12/25 at 08:59;  

Status UNV


Gabapentin 100 mg DAILY PO;  Start 12/13/24 at 09:00;  Stop 1/12/25 at 08:59;  

Status UNV


Miscellaneous Medication 10 mg DAILY PO;  Start 12/13/24 at 09:00;  Stop 1/12/25

 at 08:59;  Status UNV


Miscellaneous Medication 1 tab HS PO;  Start 12/12/24 at 21:00;  Stop 1/11/25 at

 20:59;  Status UNV


Miscellaneous Medication 0.8 mg DAILY PO;  Start 12/13/24 at 09:00;  Stop 

1/12/25 at 08:59;  Status UNV


Miscellaneous Medication 88 mcg DAILY PO;  Start 12/13/24 at 09:00;  Stop 

1/12/25 at 08:59;  Status UNV











KESHIA CASAS Jr.       Dec 12, 2024 10:45

## 2024-12-12 NOTE — CONS
NEPHROLOGY NOTE



This patient is brought to the emergency room and found to have a positive blood

culture and in dialysis with septic features and we have sent her to the ER for 

that reason.



HISTORY OF PRESENT ILLNESS:  This patient has multiple medical problems, a 

74-year-old with underlying possible liver abscess.  This patient has underlying

type 2 diabetes, hypertension.  The patient has previous cholecystectomy.  The 

patient has end-stage renal disease, hypertension, anemia, hyperlipidemia.  The 

patient is critically ill.  Yesterday, I saw her in dialysis and she was having 

some chills.  She has been recently to the emergency room for abdominal pain.  

The patient now on workup has detected a liver mass versus abscess.  No other 

associated finding.



PAST MEDICAL HISTORY:  Diabetes, end-stage renal disease, hypertension, anemia 

and multiple others as per records.



PAST SURGICAL HISTORY:  AV access, cholecystectomy apparently.



ALLERGIES:  None.



SOCIAL HISTORY:  The patient has appendicectomy and eye surgery also.



FAMILY HISTORY:  Negative for kidney cyst and stone and family members have 

end-stage renal disease, diabetes in the family.



REVIEW OF SYSTEMS:

CONSTITUTIONAL:  Has fever, chills reported.  No rigors.

HEENT:  With no headache, oral ulcers, sore throat or difficulty swallowing.

RESPIRATORY:  With no cough, expectoration, hemoptysis or pleuritic pain.

CARDIOVASCULAR:  No orthopnea or PND.

GASTROINTESTINAL:  No nausea, vomiting, diarrhea reported.

GENITOURINARY:  Negative for dysuria or hematuria.

DERMATOLOGICAL:  No rashes, pruritus or skin lesion.

ENDOCRINE:  No polyuria, polydipsia or polyphagia.

PSYCHIATRIC:  Negative for anxiety, depression and hallucinations.



PHYSICAL EXAMINATION:

GENERAL:  Pale, no other distress or deformities, lying in bed.

VITAL SIGNS:  The patient has a temperature up to 100.6, pulse 60, respiratory 

rate 13.

HEENT:  Head is atraumatic.  Pupils are round and reactive.  Sclerae are 

anicteric.  Conjunctivae not pale.  Oral mucosa is not dry.

NECK:  Without masses or bruits.  Thyroid is palpable.  Neck has no bruits.

CHEST:  Shows equal thoracic percussion note being resonant in all areas.

CARDIAC:  Regular rhythm, no rub, no S3, S4.  No parasternal heave.

ABDOMEN:  No guarding or tenderness.  Bowel sounds are normoactive.

EXTREMITIES:  With no edema and no cyanosis or clubbing.

BACK:  No tenderness or back deformities.



LABORATORY, DIAGNOSTIC AND IMAGING DATA:  We have reviewed the laboratories in 

detail.  Hemoglobin is 9.7, BUN of 38, creatinine is 4.3.  Imaging studies are 

reviewed.  Serologies are reviewed.  Negative SARS and influenza.  The patient 

has recently blood culture positive.  Imaging studies personally reviewed with 

abscess in the liver detected by MRCP.  Abdominal pelvic CT was done.  Right 

hepatic mass.



PROBLEMS:  Multiple.

   * The patient is septic.

   * The patient has liver abscess.

   * Underlying end-stage renal disease.

   * Underlying diabetic nephropathy.

   * Underlying anemia.

   * Underlying hypertension.

   * Underlying hyperlipidemia, diabetes and multiple other comorbidities.  The 

     patient is critically ill.



PLAN:

   * The patient is being admitted.

   * Broad-spectrum antibiotic coverage.

   * Blood cultures results will be obtained from the dialysis.

   * Nephro-Warner one a day.

   * The patient will be on IV Dilaudid for pain 0.2 q. 4 hours.

   * Zosyn.

   * Repeat cultures.

   * Dialysis on schedule on Tuesday, Thursday and Saturday.

   * The patient will have one Nephro-Warner daily.

   * We have reviewed the external and old records.

   * All the laboratories, x-rays were personally reviewed and interpreted.

   * Follow up CBC, CMP ordered.

   * I have discussed with other team physicians.  We will continue monitoring. 

     Condition remains critical and guarded.  Seen several times.



Thank you for this patient.







DD: 12/11/2024 10:23 PM

DT: 12/11/2024 11:10 PM

TID: [id number] RECEIPT: [id number]

## 2024-12-12 NOTE — HMCIMG
US PERC DRN, LIVER W IMG IR



REASON: RT HEPATIC FLUID COLLECTION abscess



COMPARISON: Previous CT 12/10/2024. 



TECHNIQUE: Ultrasound-guided drain placement within right lobe hepatic

abscess. 



FINDINGS: Ultrasound demonstrates complex fluid collection within the

right lobe of the liver with air foci present.



PROCEDURE:



Informed consent obtained from the patient following explanation of

risk, benefits, complications.  Timeout performed by radiology nursing

staff.  Right abdomen was prepped and draped in sterile fashion. 

Patient received intravenous titrated doses of Versed and fentanyl

administered by radiology nursing personnel with continuous monitoring

of the oxygen saturation, chronic status, respiratory status.  10 mL of

1% lidocaine subcutaneous utilized.  Under direct ultrasound guidance,

access gained into the abscess collection utilizing 18-gauge needle. 

Purulent fluid was aspirated and submitted for Gram stain and cultures. 

Guidewire was placed and visualized on ultrasound.  Fascial dilatation

performed, 8 Malay drain was placed within the fluid collection under

direct ultrasound guidance.  Aspirate yielded purulent fluid.  Specimen

submitted for Gram stain and cultures.  Catheter connected to external

drainage bag.  Retention suture and sterile dressing applied.  Patient

tolerated procedure well without evidence of complication.



IMPRESSION: 

  Ultrasound-guided drain placement within right lobe hepatic abscess. 

Cultures pending.

## 2024-12-12 NOTE — PN
[facility] PROGRESS NOTE 





Date Patient Seen: Dec 12, 2024 


Time of Visit: 10:33


Supervising Physician: NORBERT ROQUE MD





Primary Care Physician: [Dr. Delgado Isaacs ]


Outpatient Specialists: [ Dr. Swan, nephro ]]


Inpatient Consults: [Dr. Ramirez, surgery, Dr. Swan, nephro ]





PROBLEM LIST:


Sepsis secondary to 3x3.4 Hepatic Abscess 


ESRD HD TT HS 


Chronic Congestive Diastolic Heart Failure , Echo 2023 EF of 65% 


Primary hypertension 


HLD 


Diabetes mellitus II Hg A1c of 7.2 % from 2019





INTERVAL HISTORY:


Patient  is pending IR for aspiration and drain placement as recommended by 

General Surgery given her  MRCP revealing findings consistent with a3 x 3.4 

hepatic abscess.  Leukocytosis is trending down. Alk phos trending down.


Tumor markers negative. 


Will continue with Zosyn, blood cultures have been drawn and negative x 1 day. 


No further fevers. 


Infectious disease consulted.





REVIEW OF SYSTEMS: 


12 point ROS reviewed with patient. Pertinent positives mentioned above. 

Otherwise negative.





PHYSICAL EXAM: 


GENERAL: alert, weak, awake oriented x 3


HEENT: EOMI, Sclera non icteric, moist mucosa 


NECK: Supple, no JVD, trachea midline 


LUNGS: Clear breath sounds bilaterally. No wheezes


HEART: Regular rate and rhythm. Normal S1 and S2, without murmurs 


ABD: Abdomen soft but tender on RUQ. Bowel sounds present,


EXT: No clubbing cyanosis or edema


NEURO: Alert and oriented to person, follows commands





                             Vital Signs (last 8hr)








  Date Time  Temp Pulse Resp B/P (MAP) Pulse Ox O2 Delivery O2 Flow Rate FiO2


 


12/12/24 09:29 98.1 62 16 129/50  Room Air  


 


12/12/24 09:15 98.1 66 16 122/51  Room Air  


 


12/12/24 09:00  67 16 123/51  Room Air  


 


12/12/24 08:45  69 16 132/86  Room Air  


 


12/12/24 08:30  67 16 126/44  Room Air  


 


12/12/24 08:15  72 16 109/45  Room Air  


 


12/12/24 08:00  69 16 127/48  Room Air  


 


12/12/24 08:00 98.4 71 18 124/48 94 Room Air  


 


12/12/24 07:45  73 16 124/66  Room Air  


 


12/12/24 07:30  66 16 135/50  Room Air  


 


12/12/24 07:23  69 18   N/Cannula Low lpm 2.0 28


 


12/12/24 07:15  69 16 137/50  Room Air  


 


12/12/24 07:00  71 16 129/48  Room Air  


 


12/12/24 06:45  68 16 144/49  Room Air  


 


12/12/24 06:30  66 16 130/44  Room Air  


 


12/12/24 06:15 97.9 64 16 130/48  Room Air  


 


12/12/24 05:50 97.9 60 16 128/42  Room Air  


 


12/12/24 03:00 99.1 68 19 126/37 96 Room Air  











LABS:





                                Hematology Labs:








Test


 12/12/24


03:54 Range/Units


 


 


White Blood Count 12.2 H 4.8-10.8  K/uL


 


Red Blood Count


 2.83 L


 4.00-5.50


MIL/uL


 


Hemoglobin 9.8 L 12.0-16.0  g/dL


 


Hematocrit 29.5 L 36-48  %


 


Mean Corpuscular Volume 104.2 H 79-99  fL


 


Mean Corpuscular Hemoglobin 34.6 H 27.0-33.0  pg


 


Mean Corpuscular Hemoglobin


Concent 33.2 


 32.0-36.0  g/dL





 


Red Cell Distribution Width 12.3  11.0-15.5  %


 


Platelet Count 153  130-400  K/uL


 


Mean Platelet Volume 10.8 H 7.5-10.5  fL


 


Immature Granulocyte % (Auto) 0.7  0-1  %


 


Neutrophils (%) (Auto) 82.6 H 40.0-77.0  %


 


Lymphocytes (%) (Auto) 6.5 L 21.0-51.0  %


 


Monocytes (%) (Auto) 9.6  3.0-13.0  %


 


Eosinophils (%) (Auto) 0.4  0.0-8.0  %


 


Basophils (%) (Auto) 0.2  0.0-5.0  %


 


Neutrophils # (Auto) 10.1 H 1.8-7.7  K/uL


 


Lymphocytes # (Auto) 0.8 L 1.0-4.8  K/uL


 


Monocytes # (Auto) 1.2 H 0.1-1.0  K/uL


 


Eosinophils # (Auto) 0.05  0.00-0.70  K/uL


 


Basophils # (Auto) 0.03  0.00-0.20  K/uL


 


Absolute Immature Granulocyte


(auto 0.09 


 0-1  K/uL





 


Nucleated Red Blood Cells 0.0  0.0-0.19  %








                                 Chemistry Labs:








Test


 12/12/24


05:09 12/12/24


03:54 12/11/24


06:53 12/11/24


01:03 Range/Units


 


 


Whole Blood Glucose 127 H      MG/DL


 


Sodium Level  137    136-145  mmol/L


 


Potassium Level  4.7    3.5-5.1  mmol/L


 


Chloride Level  100 L   101-111  mmol/L


 


Carbon Dioxide Level  25    21-32  mmol/L


 


Blood Urea Nitrogen  53 H   7-18  mg/dL


 


Creatinine  5.5 H   0.5-1.0  mg/dL


 


Glomerular Filtration Rate


Calc 


 8 


 


 


 >90  mL/min





 


Random Glucose  120 H     mg/dL


 


Total Calcium  8.0 L   8.5-10.1  mg/dL


 


Phosphorus Level  6.9 H   2.5-4.9  mg/dL


 


Magnesium Level


 


 2.10 


 


 


 1.80-2.40


mg/dL


 


Total Bilirubin  0.9    0.2-1.0  mg/dL


 


Aspartate Amino Transf


(AST/SGOT) 


 45 H


 


 


 10-37  U/L





 


Alanine Aminotransferase


(ALT/SGPT) 


 52 


 


 


 12-78  U/L





 


Alkaline Phosphatase  252 H     U/L


 


Total Protein  6.7    6.0-8.3  g/dL


 


Albumin  2.4 L   3.5-5.0  g/dL


 


Thyroid Stimulating Hormone


(TSH) 


 1.11 #


 


 


 0.36-3.74


uIU/mL


 


Hemoglobin A1c   5.4   4.0-6.0  %


 


Estimated Average Glucose


(eAG) 


 


 108 


 


   mg/dL





 


Ionized Calcium


 


 


 1.01 L


 


 1.16-1.32


MMOL/L


 


Direct Bilirubin   0.6 H  0.0-0.3  mg/dL


 


Tumor Marker Alpha Fetoprotein   1.8   0.0-9.2  ng/mL


 


Carcinoembryonic Antigen   2.3   0.0-4.7  ng/mL


 


CA 19-9 Antigen   4   0-35  U/mL


 


 Antigen   8.5   0.0-38.1  U/mL


 


Lactic Acid Level    0.7 L 0.8-2.5  mmol/L


 


Test


 12/10/24


21:40 


 


 


 Range/Units


 


 


Troponin I High Sensitivity 29     4-50  ng/L


 


B-Type Natriuretic Peptide 1430 H    0-100  pg/mL


 


Lipase 40     16-77  U/L


 


Procalcitonin 23.66 H    0.05-0.5  ng/mL











DIAGNOSTICS / RADIOLOGY RESULTS:


[ ]





PLAN 





NEURO: 


Minimize central acting medications as possible. 


Maintain fall precautions, adequate lighting during the day





PULMONARY: 


Supplemental 02 as needed. 


Maintain aspiration precautions at all times


IS , nebs prn 





CARDIOVASCULAR: 


Follow hemodynamics. 


Vital signs per facility protocol


Several anti HTN medications at bedside, will reconcile into system . 


BP marginal only resume carvedilol and amlodine with parameters to hold


Hold Lasix due to septic state


Hold statin given abnormal Lfts 


Hydralazine Iv prn 





GI & NUTRITION:


Npo for procedure 


Consult General surgery and GI regarding Liver abscess. 


Ir for drainage 


Continue stool softeners and laxatives as needed.





KIDNEYS & ELECTROLYTES: 


HD per nephrology recs 





ENDOCRINE:


Maintain blood glucose between 100-180 at all times.


Hypoglycemia protocol in place





INFECTIOUS DISEASE: 


Trend temperature, WBC and procalcitonin level- zosyn 


Follow cultures, deescalate antibiotics as soon as possible.


Panculture if new onset fever





ONCOLOGY/HEMATOLOGY/COAGULATION: 


Monitor for s/s of bleeding


Monitor hemoglobin, coagulation studies as needed





SKIN:


Pressure ulcer prevention per facility protocol


Specialty mattress





ORTHO/REHAB:


Continue PT/OT 





Prophylaxis:


Continue GI and DVT prophylaxis





Code Status: 


Full Resuscitation





Disposition:


TBD





Other:


Total patient care time :  35 minutes











ALON GOMEZ              Dec 12, 2024 10:38

## 2024-12-13 VITALS
HEART RATE: 67 BPM | TEMPERATURE: 97.6 F | DIASTOLIC BLOOD PRESSURE: 58 MMHG | SYSTOLIC BLOOD PRESSURE: 151 MMHG | RESPIRATION RATE: 20 BRPM

## 2024-12-13 VITALS
RESPIRATION RATE: 19 BRPM | TEMPERATURE: 98.8 F | HEART RATE: 70 BPM | SYSTOLIC BLOOD PRESSURE: 155 MMHG | DIASTOLIC BLOOD PRESSURE: 53 MMHG

## 2024-12-13 VITALS
RESPIRATION RATE: 19 BRPM | SYSTOLIC BLOOD PRESSURE: 153 MMHG | HEART RATE: 63 BPM | TEMPERATURE: 98.7 F | DIASTOLIC BLOOD PRESSURE: 55 MMHG

## 2024-12-13 VITALS
DIASTOLIC BLOOD PRESSURE: 47 MMHG | RESPIRATION RATE: 20 BRPM | HEART RATE: 77 BPM | SYSTOLIC BLOOD PRESSURE: 159 MMHG | TEMPERATURE: 98.8 F

## 2024-12-13 VITALS — OXYGEN SATURATION: 97 % | RESPIRATION RATE: 18 BRPM | HEART RATE: 65 BPM

## 2024-12-13 VITALS
SYSTOLIC BLOOD PRESSURE: 164 MMHG | RESPIRATION RATE: 20 BRPM | DIASTOLIC BLOOD PRESSURE: 51 MMHG | OXYGEN SATURATION: 96 % | TEMPERATURE: 98.5 F | HEART RATE: 78 BPM

## 2024-12-13 VITALS
SYSTOLIC BLOOD PRESSURE: 145 MMHG | TEMPERATURE: 98.6 F | RESPIRATION RATE: 21 BRPM | HEART RATE: 62 BPM | DIASTOLIC BLOOD PRESSURE: 62 MMHG

## 2024-12-13 VITALS — OXYGEN SATURATION: 91 %

## 2024-12-13 LAB
ALBUMIN SERPL-MCNC: 2.4 G/DL (ref 3.5–5)
ALT SERPL-CCNC: 57 U/L (ref 12–78)
AST SERPL-CCNC: 41 U/L (ref 10–37)
BASOPHILS # BLD AUTO: 0.03 K/UL (ref 0–0.2)
BASOPHILS NFR BLD AUTO: 0.3 % (ref 0–5)
BILIRUB SERPL-MCNC: 0.9 MG/DL (ref 0.2–1)
BUN SERPL-MCNC: 45 MG/DL (ref 7–18)
CHLORIDE SERPL-SCNC: 99 MMOL/L (ref 101–111)
CO2 SERPL-SCNC: 30 MMOL/L (ref 21–32)
CREAT SERPL-MCNC: 5 MG/DL (ref 0.5–1)
EOSINOPHIL # BLD AUTO: 0 K/UL (ref 0–0.7)
EOSINOPHIL NFR BLD AUTO: 0 % (ref 0–8)
ERYTHROCYTE [DISTWIDTH] IN BLOOD BY AUTOMATED COUNT: 12.5 % (ref 11–15.5)
GFR SERPL CREATININE-BSD FRML MDRD: 9 ML/MIN (ref 90–?)
GLUCOSE SERPL-MCNC: 139 MG/DL (ref 70–105)
HCT VFR BLD AUTO: 30.6 % (ref 36–48)
IMM GRANULOCYTES # BLD: 0.14 K/UL (ref 0–1)
INR PPP: 1.11 (ref 0.85–1.15)
LYMPHOCYTES # SPEC AUTO: 0.6 K/UL (ref 1–4.8)
LYMPHOCYTES NFR SPEC AUTO: 5.5 % (ref 21–51)
MAGNESIUM SERPL-MCNC: 2.1 MG/DL (ref 1.8–2.4)
MCH RBC QN AUTO: 34.6 PG (ref 27–33)
MCHC RBC AUTO-ENTMCNC: 33.7 G/DL (ref 32–36)
MCV RBC AUTO: 102.7 FL (ref 79–99)
MONOCYTES # BLD AUTO: 1 K/UL (ref 0.1–1)
MONOCYTES NFR BLD AUTO: 9.2 % (ref 3–13)
NEUTROPHILS # BLD AUTO: 9.3 K/UL (ref 1.8–7.7)
NEUTROPHILS NFR BLD AUTO: 83.7 % (ref 40–77)
NRBC BLD MANUAL-RTO: 0.2 % (ref 0–0.19)
PHOSPHATE SERPL-MCNC: 7.1 MG/DL (ref 2.5–4.9)
PLATELET # BLD AUTO: 178 K/UL (ref 130–400)
POTASSIUM SERPL-SCNC: 4.1 MMOL/L (ref 3.5–5.1)
PROT SERPL-MCNC: 7 G/DL (ref 6–8.3)
PROTHROMBIN TIME: 11.9 SEC (ref 9.6–11.6)
RBC # BLD AUTO: 2.98 MIL/UL (ref 4–5.5)
SODIUM SERPL-SCNC: 141 MMOL/L (ref 136–145)
WBC # BLD AUTO: 11.1 K/UL (ref 4.8–10.8)

## 2024-12-13 RX ADMIN — ASCORBIC ACID, FOLIC ACID, NIACIN, THIAMINE, RIBOFLAVIN, PYRIDOXINE, CYANOCOBALAMIN, PANTOTHENIC ACID, BIOTIN SCH CAP: 100; 150; 6; 1; 20; 5; 10; 1.7; 1.5 CAPSULE, LIQUID FILLED ORAL at 09:00

## 2024-12-13 RX ADMIN — CINACALCET HYDROCHLORIDE SCH MG: 30 TABLET, COATED ORAL at 09:00

## 2024-12-13 RX ADMIN — ASPIRIN SCH MG: 81 TABLET, CHEWABLE ORAL at 09:00

## 2024-12-13 RX ADMIN — POLYETHYLENE GLYCOL 3350, SODIUM SULFATE ANHYDROUS, SODIUM BICARBONATE, SODIUM CHLORIDE, POTASSIUM CHLORIDE SCH ML: 236; 22.74; 6.74; 5.86; 2.97 POWDER, FOR SOLUTION ORAL at 17:59

## 2024-12-13 NOTE — PN
NEPHROLOGY PROGRESS NOTE








Date/Time Patient Seen: Dec 13, 2024





SUBJECTIVE:


The patient is a 74-year-old female with a history of diabetes, end stage renal 

disease on hemodialysis Tuesday Thursday Saturday, recent cholecystectomy 


She presents to the emergency department with complaints of generalized body 

weakness, bilateral flank pain, nausea, right upper abdominal pain onset two 

days ago.  


Blood cultures from outpatient dialysis clinic positive for gram negative 

bacilli 


Continues on antibiotics.


Blood cultures collected on 12/10/2024 have been negative


Pending body fluid cultures.


S/P percutaneous drain placed for hepatic abscess on 12/12


She tolerated this without difficulty.


Dialysis planned for tomorrow


She was seen in the medical floor, no acute distress





REVIEW OF SYSTEMS:


GENERAL: Negative for any nausea, vomiting, fevers, chills, or weight loss.


NEUROLOGIC: Negative for any blurry vision, blind spots, double vision, facial 

asymmetry, dysphagia, dysarthria, hemiparesis, hemisensory deficits, vertigo, 

ataxia.


HEENT: Negative for any head trauma, neck trauma, neck stiffness, photophobia, 

phonophobia, sinusitis, rhinitis.


CARDIAC: Negative for any chest pain, dyspnea on exertion, paroxysmal nocturnal 

dyspnea, peripheral edema.


PULMONARY: Negative for any shortness of breath, wheezing, COPD, or TB exposure.


GASTROINTESTINAL: Negative for any abdominal pain, nausea, vomiting, bright red 

blood per rectum, melena.


GENITOURINARY: Negative for any dysuria, hematuria, incontinence.


INTEGUMENTARY: Negative for any rashes, cuts, insect bites.


RHEUMATOLOGIC: Negative for any joint pains, photosensitive rashes, history of 

vasculitis or kidney problems.


HEMATOLOGIC: Negative for any abnormal bruising, frequent infections or 

bleeding.





                             Vital Signs (last 8hr)








  Date Time  Temp Pulse Resp B/P (MAP) Pulse Ox O2 Delivery O2 Flow Rate FiO2


 


12/13/24 12:00 98.8 63 19 153/55 94 Nasal Cannula 3.0 


 


12/13/24 08:00 98.8 77 20 159/47 91 Room Air  








PHYSICAL EXAM:


GENERAL: Alert and oriented x 3. No acute distress. Well-nourished.


EYES: EOMI. Anicteric.


HENT: Moist mucous membranes. No scleral icterus. No cervical lymphadenopathy.


LUNGS: Clear to auscultation bilaterally. No accessory muscle use.


CARDIOVASCULAR: Regular rate and rhythm. No murmur. No JVD.


ABDOMEN: Soft, non-tender and non-distended. No palpable masses.


EXTREMITIES: No edema. Non-tender.?SKIN: No rashes or lesions. Warm.


NEUROLOGIC: No focal neurological deficits. CN II-XII grossly intact, but not 

individually tested.


PSYCHIATRIC: Cooperative. Appropriate mood and affect.








                               Current Medications








 Medications


  (Trade)  Dose


 Ordered  Sig/Adenike


 Route


 PRN Reason  Start Time


 Stop Time Status Last Admin


Dose Admin


 


 Acetaminophen


  (TYLenol 325MG


 TAB)  650 mg  Q6H  PRN


 PO


 FEVER/MILD PAIN LEVEL 1-3  12/11/24 00:30


 1/10/25 00:29  12/12/24 09:12


650 MG


 


 Albuterol Sulfate


  (Proventil


 0.083% 2.5mg/3ml)  2.5 mg  [id number]  PRN


 IH


 SHORTNESS OF BREATH  12/11/24 00:30


 1/10/25 00:29   





 


 Amlodipine


 Besylate


  (NorvASC 5MG TAB)  10 mg  DAILY


 PO


   12/13/24 09:00


 1/12/25 08:59   





 


 Aspirin


  (Aspirin 81mg


 Chew Tab)  81 mg  DAILY


 PO


   12/13/24 09:00


 1/12/25 08:59   





 


 Carvedilol


  (Coreg 3.125MG)  3.125 mg  BID


 PO


   12/12/24 21:00


 1/11/25 20:59  12/12/24 21:23


3.125 MG


 


 Cinacalcet


  (Sensipar 30mg


 Tab)  30 mg  DAILY


 PO


   12/13/24 09:00


 1/12/25 08:59   





 


 Doxazosin Mesylate


  (Doxazosin


 Mesylate)  1 mg  HS


 PO


   12/12/24 21:00


 1/11/25 20:59  12/12/24 21:22


1 MG


 


 Gabapentin


  (NEURontin 100


 mg CAP)  100 mg  DAILY


 PO


   12/13/24 09:00


 1/12/25 08:59   





 


 Hydralazine HCl


  (APRESOLine 20MG


 INJ)  10 mg  Q6H  PRN


 IV


 SBP GREATER THAN 180  12/11/24 00:30


 1/10/25 00:29   





 


 Hydromorphone HCl


  (DiLAUDid 0.5MG


 INJ)  0.2 mg  Q4H  PRN


 IVP


 SEVERE PAIN (7-10)  12/11/24 00:30


 12/16/24 00:29  12/12/24 00:53


0.2 MG


 


 Labetalol HCl


  (TRANdate 20MG


 SYG)  10 mg  Q2H  PRN


 IV


 SBP GREATER THAN 180  12/11/24 00:30


 1/10/25 00:29   





 


 Lactulose


  (Constulose 20gm/


 30ml Udcup)  20 gm  Q6H  PRN


 PO


 CONSTIPATION  12/11/24 00:30


 1/10/25 00:29   





 


 Levothyroxine


 Sodium


  (SYNTHroid 88MCG


 TAB)  88 mcg  SYN


 PO


   12/13/24 06:30


 1/12/25 06:29   





 


 Ondansetron HCl


  (zoFRAN 4MG INJ)  4 mg  Q6H  PRN


 IVP


 NAUSEA/VOMITING  12/11/24 00:30


 1/10/25 00:29   





 


 Pharmacy Profile


 Note


  (Pharmacy


 Communication)  1 each  ONCE


 MISC


   12/11/24 00:30


 12/11/24 00:26 DC  





 


 Piperacillin Sod/


 Tazobactam Sod


  (Zosyn


 3.375gm+NS 50ml)  3.375 gm  Q12H


 IV


   12/11/24 00:30


 12/21/24 00:29  12/13/24 13:33


3.375 GM


 


 Vancomycin HCl


  (Vancomycin


 750mg)  750 mg  TTHSPHD


 IVPB


   12/12/24 16:00


 12/22/24 15:59  12/12/24 17:25


750 MG


 


 Vancomycin HCl


  (Vancomycin


 Protocol)  1 each  AD


 IV


   12/11/24 00:30


 12/11/24 00:25 DC  





 


 Vancomycin HCl


  (Vancomycin


 Protocol)  1 each  AD


 IV


   12/11/24 00:30


 12/25/24 00:29   





 


 Vitamin B Complex/


 Vit C/Folic Acid


  (Nephrovite


 Tablet)  1 cap  DAILY


 PO


   12/12/24 09:00


 1/11/25 08:59  12/12/24 09:11


1 CAP


 


 Vitamin B Complex/


 Vit C/Folic Acid


  (Nephrovite


 Tablet)  1 cap  DAILY


 PO


   12/13/24 09:00


 1/12/25 08:59   














LABORATORY:


[ ]








                                Hematology Labs:








Test


 12/13/24


03:00 Range/Units


 


 


White Blood Count 11.1 H 4.8-10.8  K/uL


 


Red Blood Count


 2.98 L


 4.00-5.50


MIL/uL


 


Hemoglobin 10.3 L 12.0-16.0  g/dL


 


Hematocrit 30.6 L 36-48  %


 


Mean Corpuscular Volume 102.7 H 79-99  fL


 


Mean Corpuscular Hemoglobin 34.6 H 27.0-33.0  pg


 


Mean Corpuscular Hemoglobin


Concent 33.7 


 32.0-36.0  g/dL





 


Red Cell Distribution Width 12.5  11.0-15.5  %


 


Platelet Count 178  130-400  K/uL


 


Mean Platelet Volume 10.6 H 7.5-10.5  fL


 


Immature Granulocyte % (Auto) 1.3 H 0-1  %


 


Neutrophils (%) (Auto) 83.7 H 40.0-77.0  %


 


Lymphocytes (%) (Auto) 5.5 L 21.0-51.0  %


 


Monocytes (%) (Auto) 9.2  3.0-13.0  %


 


Eosinophils (%) (Auto) 0.0  0.0-8.0  %


 


Basophils (%) (Auto) 0.3  0.0-5.0  %


 


Neutrophils # (Auto) 9.3 H 1.8-7.7  K/uL


 


Lymphocytes # (Auto) 0.6 L 1.0-4.8  K/uL


 


Monocytes # (Auto) 1.0  0.1-1.0  K/uL


 


Eosinophils # (Auto) 0.00  0.00-0.70  K/uL


 


Basophils # (Auto) 0.03  0.00-0.20  K/uL


 


Absolute Immature Granulocyte


(auto 0.14 


 0-1  K/uL





 


Nucleated Red Blood Cells 0.2 H 0.0-0.19  %








                                 Chemistry Labs:








Test


 12/13/24


11:13 12/13/24


03:00 12/12/24


03:54 Range/Units


 


 


Whole Blood Glucose 108      MG/DL


 


Sodium Level  141   136-145  mmol/L


 


Potassium Level  4.1   3.5-5.1  mmol/L


 


Chloride Level  99 L  101-111  mmol/L


 


Carbon Dioxide Level  30   21-32  mmol/L


 


Blood Urea Nitrogen  45 H  7-18  mg/dL


 


Creatinine  5.0 H  0.5-1.0  mg/dL


 


Glomerular Filtration Rate


Calc 


 9 


 


 >90  mL/min





 


Random Glucose  139 H    mg/dL


 


Total Calcium  8.0 L  8.5-10.1  mg/dL


 


Phosphorus Level  7.1 H  2.5-4.9  mg/dL


 


Magnesium Level


 


 2.10 


 


 1.80-2.40


mg/dL


 


Total Bilirubin  0.9   0.2-1.0  mg/dL


 


Aspartate Amino Transf


(AST/SGOT) 


 41 H


 


 10-37  U/L





 


Alanine Aminotransferase


(ALT/SGPT) 


 57 


 


 12-78  U/L





 


Alkaline Phosphatase  262 H    U/L


 


Total Protein  7.0   6.0-8.3  g/dL


 


Albumin  2.4 L  3.5-5.0  g/dL


 


Thyroid Stimulating Hormone


(TSH) 


 


 1.11 #


 0.36-3.74


uIU/mL








                                Coagulation Labs:








Test


 12/13/24


03:00 12/12/24


13:43 Range/Units


 


 


Prothrombin Time 11.9 H  9.6-11.6  SEC


 


Prothromb Time International


Ratio 1.11 


 


 0.85-1.15  





 


Activated Partial


Thromboplast Time 


 28.2 


 26.3-35.5  SEC














DIAGNOSTICS / RADIOLOGY:


REASON: RT HEPATIC FLUID COLLECTION


ORDERING PHYSICIAN: ALON GOMEZ


PROCEDURE: DRNG LIVR  -  US PERC DRN, LIVER W IMG IR





US PERC DRN, LIVER W IMG IR





REASON: RT HEPATIC FLUID COLLECTION abscess





COMPARISON: Previous CT 12/10/2024. 





TECHNIQUE: Ultrasound-guided drain placement within right lobe hepatic


abscess. 





FINDINGS: Ultrasound demonstrates complex fluid collection within the


right lobe of the liver with air foci present.





PROCEDURE:





Informed consent obtained from the patient following explanation of


risk, benefits, complications.  Timeout performed by radiology nursing


staff.  Right abdomen was prepped and draped in sterile fashion. 


Patient received intravenous titrated doses of Versed and fentanyl


administered by radiology nursing personnel with continuous monitoring


of the oxygen saturation, chronic status, respiratory status.  10 mL of


1% lidocaine subcutaneous utilized.  Under direct ultrasound guidance,


access gained into the abscess collection utilizing 18-gauge needle. 


Purulent fluid was aspirated and submitted for Gram stain and cultures. 


Guidewire was placed and visualized on ultrasound.  Fascial dilatation


performed, 8 French drain was placed within the fluid collection under


direct ultrasound guidance.  Aspirate yielded purulent fluid.  Specimen


submitted for Gram stain and cultures.  Catheter connected to external


drainage bag.  Retention suture and sterile dressing applied.  Patient


tolerated procedure well without evidence of complication.





IMPRESSION: 


  Ultrasound-guided drain placement within right lobe hepatic abscess. 


Cultures pending.











DICTATED BY: FELIPE WHITFIELD DO


DATE: 12/12/24 1606





REASON: R/O ASCENDING CHOLANGITIS


ORDERING PHYSICIAN: RADHA ZUNIGA [facility]


PROCEDURE: MRCP WWO  -  MRCP(ABDWWO)CHOLANGIOPANCREATO





MRCP(ABDWWO)CHOLANGIOPANCREATO





REASON: R/O ASCENDING CHOLANGITIS





COMPARISON: CT abdomen and pelvis 12/10/2024





TECHNIQUE: Routine imaging protocol was performed. Images were also


obtained pre and postgadolinium contrast infusion, 20 cc Clariscan IV. 





FINDINGS: 





There is a 3 x 3.4 cm fluid collection in the anterior segment of the


right lobe of the liver, relatively high under the diaphragm. This has a


short air-fluid level. There is peripheral contrast enhancement.


Findings are consistent with a hepatic abscess.





There are no other focal liver lesions. Portal and hepatic veins appear


patent. Gallbladder is absent. There is a small amount of air in the


biliary tree, probably from previous S sphincterotomy. Intrahepatic


biliary tree does not appear distended. Common duct measures between 4


and 5 mm near the head of the pancreas.





There is renal cortical thinning moderate in degree. There are small


bilateral renal cysts. Spleen and pancreas appear normal. Pancreatic


duct is not dilated. There are no focal fluid collections. There is no


free air or fluid. Anterior abdominal wall appears intact.





IMPRESSION: 


  


1. 3 x 3.4 cm focal fluid collection in the anterior segment right lobe


of the liver, with an air-fluid level, consistent with abscess.


2. Absent gallbladder


3. Moderate bilateral renal cortical thinning, there are also bilateral


renal cysts.











DICTATED BY: JOSE CERVANTES MD


DATE: 12/11/24 1434





REASON: bilateral flank pain, ruq pain


ORDERING PHYSICIAN: BONNIE THOMPSON FNMARY


PROCEDURE: ABD PEL WO  -  CT ABDOMEN/PELVIS W/O CONTRAST





CT ABDOMEN/PELVIS W/O CONTRAST





HISTORY:  Bilateral flank pain





COMPARISON: 11/5/2016





TECHNIQUE: Multiple sequential axial images of the abdomen and pelvis


were obtained from the dome of the diaphragm through symphysis pubis.


Patient was not given contrast through intravenous route. Oral contrast


was not given.





FINDINGS:  No pleural effusion is seen bilaterally.  There is no


evidence of parenchymal disease or pulmonary nodule of the visualized


lower lungs.  Degenerative changes of the thoracolumbar spine are


present. The heart is not enlarged.





There is 3.7 cm right hepatic nodule. Intrahepatic biliary air is seen.


Postcholecystectomy changes are seen. There are bilateral renal cortical


scarring. Bilateral renal vascular calcifications are seen. The liver,


spleen, adrenal glands and pancreas are unremarkable.  There is no


evidence of hydronephrosis bilaterally.  No evidence of renal stone is


seen. There is diverticulosis.  Fecal material is seen in the colon. 


There are normal size retroperitoneal and mesenteric lymph nodes.  No


ascites is seen.  Atherosclerotic changes are present. Uterus is


enlarged with calcifications suggestive of fibroid uterus.





Pelvic sidewalls are symmetric bilaterally. Bladder is poorly distended.





IMPRESSION:


1.  There is 3.7 cm right hepatic mass with neoplastic process not


excluded. Intrahepatic biliary air is seen with postcholecystectomy


changes. Fibroid uterus. Diverticulosis.

















CT was performed with one or more following dose reduction techniques:


automated exposure control, adjustment of the mA and kv according to


patient's size, or use of a iterative reconstruction technique.








DICTATED BY: SVEN GONZALEZ MD


DATE: 12/10/24 2220..REASON: cp


ORDERING PHYSICIAN: BONNIE THOMPSON


PROCEDURE: CXR1VW  -  CHEST 1VW





CHEST 1VW





REASON: cp





COMPARISON: 12/7/2024





FINDINGS:  


Single view of the chest was obtained. Lungs are clear.  There is mild


cardiac megaly, unchanged.  There is no pulmonary vascular congestion.


Mediastinum and bony thorax appear unremarkable.





IMPRESSION:





1.  Mild cardiomegaly, unchanged no acute finding.








DICTATED BY: JOSE CERVANTES MD


DATE: 12/11/24 0929





ASSESSMENT:  


End stage renal disease 


Anemia 


Sepsis


Hepatic Abscess 


Chronic Congestive Diastolic Heart Failure , Echo 2023 EF of 65% 


Primary hypertension 


HLD 


Diabetes mellitus II 





PLAN: 


Labs and Diagnostics/ Radiology personally reviewed and interpreted by myself 

and supervising physician


We have reviewed dialysis and external records in detail


Continue dialysis schedule Tuesday Thursday Saturday 


Obtain Sensitivity from outpatient clinic 


1.5 L fluid restriction


Continue to monitor H&H


Epogen on dialysis days, as needed 


Continue with frequent monitoring of renal function, anemia, and electrolytes


Order CBC, BMP, and electrolytes in the morning


May use Dilaudid 0.5 mg IV every 6 hours as needed for severe pain


Maintain glucose between 100-180mg/dl, AccuCheks QC and HS


Monitor blood pressure adjust medication doses as needed


Maintain normotensive state; keep systolic blood pressure between 110-160


Strict intake, output, and daily weight should be monitored


Please renally adjust medications.


Avoid nephrotoxics and nonsteroidal drugs.


We will continue to monitor the patient closely


We have discussed with the other team physicians in detail about the care plan


ATTESTATION BY PHYSICIAN


I have seen and examined the patient. I reviewed the documentation, medical 

decision making, and treatment plan as noted by the mid-level provider above. I 

agree with the findings and plan of care.











PRISCA ASHER MD, [facility]             Dec 13, 2024 14:17

## 2024-12-13 NOTE — PN
INFECTIOUS DISEASE  PROGRESS NOTE








Date of Service: Dec 13, 2024





SUBJECTIVE:


This is a 74-year-old female patient with past medical history of diabetes 

mellitus and end-stage renal disease, on dialysis who presented to the hospital 

with chief complaint of generalized body weakness, and right upper abdominal 

quadrant pain.  An MRCP showed fluid collection in the anterior segment of the 

right lobe of the liver consistent with abscess and the reason for this consult.


Patient was seen and examined at bedside in room 330.  Patient is status post 

percutaneous drain placement day #1.  No fever, temperature is 98.8 and a WBC 

of 11.1.  We will follow up on the aspirate cultures.  Currently continues on 

vancomycin per pharmacy protocol and Zosyn IV.  Patient was dialyzed yesterday 

and 2.5 L removed.  We will continue to monitor patient's care.





PHYSICAL EXAM


EYES:  Anicteric.  Pupils equal and reactive.


HENT: No oral thrush seen, moist Oral mucosa.


NECK:  Supple, no JVD or thyromegaly.


LUNGS:  Good air entry.  No rales, no rhonchi.


CARDIOVASCULAR:  S1, S2 regular.  No murmur heard.


ABDOMEN:  Soft, non tender, bowel sounds present, no organomegaly.  Right 

percutaneous drainage catheter.


CENTRAL NERVOUS SYSTEM:  Awake, alert, oriented x 3.  


SKIN:  No rashes, no swelling.


LYMPHATICS: No peripheral lymphadenopathy.


MUSCULOSKELETAL:  No joint swelling, erythema or tenderness.


EXTREMITIES:  No cyanosis or clubbing.


BACK:  No deformity, no pressure ulcer.


GENITOURINARY:  No dysuria or hematuria.








Vital Sign (Last 12 Hours)








 12/13/24 12/13/24 12/13/24





 04:00 08:00 12:00


 


Temp 97.5 98.8 98.8


 


Pulse 67 77 63


 


Resp 20 20 19


 


B/P (MAP) 151/58 159/47 153/55


 


Pulse Ox 91 91 94


 


O2 Delivery Nasal Cannula Room Air Nasal Cannula


 


O2 Flow Rate 2.0  3.0














Intake & Output (last 24hrs)   


 


 12/12/24 12/12/24 12/13/24





 14:59 22:59 06:59


 


Intake Total   800 ml


 


Output Total 2500 ml  


 


Balance -2500 ml  800 ml











LABS:








                                   Laboratory:








Test


 12/13/24


11:13 12/13/24


03:00 12/12/24


15:00 12/12/24


13:43 Range/Units


 


 


Whole Blood Glucose 108       MG/DL


 


White Blood Count  11.1 H   4.8-10.8  K/uL


 


Red Blood Count


 


 2.98 L


 


 


 4.00-5.50


MIL/uL


 


Hemoglobin  10.3 L   12.0-16.0  g/dL


 


Hematocrit  30.6 L   36-48  %


 


Mean Corpuscular Volume  102.7 H   79-99  fL


 


Mean Corpuscular Hemoglobin  34.6 H   27.0-33.0  pg


 


Mean Corpuscular Hemoglobin


Concent 


 33.7 


 


 


 32.0-36.0  g/dL





 


Red Cell Distribution Width  12.5    11.0-15.5  %


 


Platelet Count  178    130-400  K/uL


 


Mean Platelet Volume  10.6 H   7.5-10.5  fL


 


Immature Granulocyte % (Auto)  1.3 H   0-1  %


 


Neutrophils (%) (Auto)  83.7 H   40.0-77.0  %


 


Lymphocytes (%) (Auto)  5.5 L   21.0-51.0  %


 


Monocytes (%) (Auto)  9.2    3.0-13.0  %


 


Eosinophils (%) (Auto)  0.0    0.0-8.0  %


 


Basophils (%) (Auto)  0.3    0.0-5.0  %


 


Neutrophils # (Auto)  9.3 H   1.8-7.7  K/uL


 


Lymphocytes # (Auto)  0.6 L   1.0-4.8  K/uL


 


Monocytes # (Auto)  1.0    0.1-1.0  K/uL


 


Eosinophils # (Auto)  0.00    0.00-0.70  K/uL


 


Basophils # (Auto)  0.03    0.00-0.20  K/uL


 


Absolute Immature Granulocyte


(auto 


 0.14 


 


 


 0-1  K/uL





 


Nucleated Red Blood Cells  0.2 H   0.0-0.19  %


 


Prothrombin Time  11.9 H   9.6-11.6  SEC


 


Prothromb Time International


Ratio 


 1.11 


 


 


 0.85-1.15  





 


Sodium Level  141    136-145  mmol/L


 


Potassium Level  4.1    3.5-5.1  mmol/L


 


Chloride Level  99 L   101-111  mmol/L


 


Carbon Dioxide Level  30    21-32  mmol/L


 


Blood Urea Nitrogen  45 H   7-18  mg/dL


 


Creatinine  5.0 H   0.5-1.0  mg/dL


 


Glomerular Filtration Rate


Calc 


 9 


 


 


 >90  mL/min





 


Random Glucose  139 H     mg/dL


 


Total Calcium  8.0 L   8.5-10.1  mg/dL


 


Phosphorus Level  7.1 H   2.5-4.9  mg/dL


 


Magnesium Level


 


 2.10 


 


 


 1.80-2.40


mg/dL


 


Total Bilirubin  0.9    0.2-1.0  mg/dL


 


Aspartate Amino Transf


(AST/SGOT) 


 41 H


 


 


 10-37  U/L





 


Alanine Aminotransferase


(ALT/SGPT) 


 57 


 


 


 12-78  U/L





 


Alkaline Phosphatase  262 H     U/L


 


Total Protein  7.0    6.0-8.3  g/dL


 


Albumin  2.4 L   3.5-5.0  g/dL


 


Body Fluid Source   ASPIRATE    


 


Body Fluid Volume   9    mL


 


Body Fluid Color   BROWN H  LT YELLOW  


 


Body Fluid Supernatant


Appearance 


 


 TURBID H


 


 CLEAR  





 


Body Fluid WBC   686617    /cu. mm.


 


Body Fluid RBC   01539    /cu. mm.


 


Body Fluid Neutrophils   79.0    %


 


Body Fluid Lymphocytes   21    %


 


Body Fluid ALT   966    U/L


 


Body Fluid Total Bilirubin   6.8    mg/dL


 


Body Fluid Lactate


Dehydrogenase 


 


 17805 


 


  U/L





 


Activated Partial


Thromboplast Time 


 


 


 28.2 


 26.3-35.5  SEC





 


Test


 12/12/24


06:45 12/12/24


03:54 


 


 Range/Units


 


 


Hepatitis B Surface Antigen. Non-Reactive     Nonreactive  


 


Hepatitis B Surface Antibody. Negative L    Reactive  


 


Hepatitis B Core Total


Antibody. Non-Reactive 


 


 


 


 Nonreactive  





 


Thyroid Stimulating Hormone


(TSH) 


 1.11 #


 


 


 0.36-3.74


uIU/mL














ASSESSMENT:


Liver abscess status post percutaneous drain placement.  


Leukocytosis.


End-stage renal disease, on dialysis.  


Diabetes mellitus.  


Anemia.  





PLAN:


Continue vancomycin per pharmacy protocol.  


Continue Zosyn IV.


Continue pain management.


We will follow up on the culture results.


Monitoring drain output.


Monitor electrolytes.





This case was reviewed and discussed with my supervising physician and the above

assessment and plan was formulated and agreed upon.


ATTESTATION BY PHYSICIAN


I have seen and examined the patient. I reviewed the documentation, medical de

cision making, and treatment plan as noted by the mid-level provider above. I 

agree with the findings and plan of care.











DULCE MARIA GARZA MD, [facility]             Dec 13, 2024 13:10

## 2024-12-13 NOTE — PN
[facility] PROGRESS NOTE 





Date Patient Seen: Dec 13, 2024 


Time of Visit: 16:27


Supervising Physician: Aurora Mauricio MD





Primary Care Physician: [Dr. Delgado Isaacs ]


Outpatient Specialists: [ Dr. Swan, nephro ]]


Inpatient Consults: [Dr. Ramirez, surgery, Dr. Swan, nephro ]





PROBLEM LIST:


Sepsis secondary to 3x3.4 Hepatic Abscess 


   S/P IR drain placement within rt lobe heaptic abscess cultures pending 

24   


ESRD HD TT HS 


Chronic Congestive Diastolic Heart Failure , Echo  EF of 65% 


Primary hypertension 


HLD 


Diabetes mellitus II Hg A1c of 7.2 % from 2019





INTERVAL HISTORY:


-patient awaiting IR for aspiration and drain placement as recommended per 

General surgery for this morning.  GI has planned EGD and colonoscopy for 

tomorrow morning.  No further fevers in the last 24 hours with a T-max of 98.8,

hemodynamically stable in no apparent respiratory distress respiratory rate of 

19 saturating 94% with 3 L via nasal cannula.  Patient has good urine output and

a bowel movement today.  White count trending down today 11.1 H&H is 10.3/30.6 

slightly improved from yesterday which was 9.8/29.5 platelet count is normal.  

On chemistry chloride 99 BUN is 45 creatinine is 5.0 with a GFR of 9.  Total 

calcium is eight phosphorus 7.1 AST is 41 alkaline phosphatase 262 albumin 2.4. 

Antibiotics per ID.





REVIEW OF SYSTEMS: 


12 point ROS reviewed with patient. Pertinent positives mentioned above. 

Otherwise negative.





PHYSICAL EXAM: 


GENERAL: alert, weak, awake oriented x 3


HEENT: EOMI, Sclera non icteric, moist mucosa 


NECK: Supple, no JVD, trachea midline 


LUNGS: Clear breath sounds bilaterally. No wheezes


HEART: Regular rate and rhythm. Normal S1 and S2, without murmurs 


ABD: Abdomen soft but tender on RUQ. Bowel sounds present,


EXT: No clubbing cyanosis or edema


NEURO: Alert and oriented to person, follows commands





                             Vital Signs (last 8hr)








  Date Time  Temp Pulse Resp B/P (MAP) Pulse Ox O2 Delivery O2 Flow Rate FiO2


 


24 12:00 98.8 63 19 153/55 94 Nasal Cannula 3.0 











LABS:





                                Hematology Labs:








Test


 24


03:00 Range/Units


 


 


White Blood Count 11.1 H 4.8-10.8  K/uL


 


Red Blood Count


 2.98 L


 4.00-5.50


MIL/uL


 


Hemoglobin 10.3 L 12.0-16.0  g/dL


 


Hematocrit 30.6 L 36-48  %


 


Mean Corpuscular Volume 102.7 H 79-99  fL


 


Mean Corpuscular Hemoglobin 34.6 H 27.0-33.0  pg


 


Mean Corpuscular Hemoglobin


Concent 33.7 


 32.0-36.0  g/dL





 


Red Cell Distribution Width 12.5  11.0-15.5  %


 


Platelet Count 178  130-400  K/uL


 


Mean Platelet Volume 10.6 H 7.5-10.5  fL


 


Immature Granulocyte % (Auto) 1.3 H 0-1  %


 


Neutrophils (%) (Auto) 83.7 H 40.0-77.0  %


 


Lymphocytes (%) (Auto) 5.5 L 21.0-51.0  %


 


Monocytes (%) (Auto) 9.2  3.0-13.0  %


 


Eosinophils (%) (Auto) 0.0  0.0-8.0  %


 


Basophils (%) (Auto) 0.3  0.0-5.0  %


 


Neutrophils # (Auto) 9.3 H 1.8-7.7  K/uL


 


Lymphocytes # (Auto) 0.6 L 1.0-4.8  K/uL


 


Monocytes # (Auto) 1.0  0.1-1.0  K/uL


 


Eosinophils # (Auto) 0.00  0.00-0.70  K/uL


 


Basophils # (Auto) 0.03  0.00-0.20  K/uL


 


Absolute Immature Granulocyte


(auto 0.14 


 0-1  K/uL





 


Nucleated Red Blood Cells 0.2 H 0.0-0.19  %








                                 Chemistry Labs:








Test


 24


15:52 24


03:00 24


03:54 Range/Units


 


 


Whole Blood Glucose 112 H     MG/DL


 


Sodium Level  141   136-145  mmol/L


 


Potassium Level  4.1   3.5-5.1  mmol/L


 


Chloride Level  99 L  101-111  mmol/L


 


Carbon Dioxide Level  30   21-32  mmol/L


 


Blood Urea Nitrogen  45 H  7-18  mg/dL


 


Creatinine  5.0 H  0.5-1.0  mg/dL


 


Glomerular Filtration Rate


Calc 


 9 


 


 >90  mL/min





 


Random Glucose  139 H    mg/dL


 


Total Calcium  8.0 L  8.5-10.1  mg/dL


 


Phosphorus Level  7.1 H  2.5-4.9  mg/dL


 


Magnesium Level


 


 2.10 


 


 1.80-2.40


mg/dL


 


Total Bilirubin  0.9   0.2-1.0  mg/dL


 


Aspartate Amino Transf


(AST/SGOT) 


 41 H


 


 10-37  U/L





 


Alanine Aminotransferase


(ALT/SGPT) 


 57 


 


 12-78  U/L





 


Alkaline Phosphatase  262 H    U/L


 


Total Protein  7.0   6.0-8.3  g/dL


 


Albumin  2.4 L  3.5-5.0  g/dL


 


Thyroid Stimulating Hormone


(TSH) 


 


 1.11 #


 0.36-3.74


uIU/mL








                                Coagulation Labs:








Test


 24


03:00 24


13:43 Range/Units


 


 


Prothrombin Time 11.9 H  9.6-11.6  SEC


 


Prothromb Time International


Ratio 1.11 


 


 0.85-1.15  





 


Activated Partial


Thromboplast Time 


 28.2 


 26.3-35.5  SEC














DIAGNOSTICS / RADIOLOGY RESULTS:


                                     Signed





PATIENT: BELLA SEGUNDO                                MR#: [id number]


ACCOUNT#: [id number]                              : 1950


SEX: F AGE: 74                                      LOCATION: 3AH


ORDER DT: 24 1343                             STATUS: ADM IN


ACCESSION#: [id number]                            REPORT#: [id number]


SERVICE DT: 24 1400





REASON: RT HEPATIC FLUID COLLECTION


ORDERING PHYSICIAN: ALON GOMEZ


PROCEDURE: DRNG LIVR  -  US PERC DRN, LIVER W IMG IR





US PERC DRN, LIVER W IMG IR





REASON: RT HEPATIC FLUID COLLECTION abscess





COMPARISON: Previous CT 12/10/2024. 





TECHNIQUE: Ultrasound-guided drain placement within right lobe hepatic


abscess. 





FINDINGS: Ultrasound demonstrates complex fluid collection within the


right lobe of the liver with air foci present.





PROCEDURE:





Informed consent obtained from the patient following explanation of


risk, benefits, complications.  Timeout performed by radiology nursing


staff.  Right abdomen was prepped and draped in sterile fashion. 


Patient received intravenous titrated doses of Versed and fentanyl


administered by radiology nursing personnel with continuous monitoring


of the oxygen saturation, chronic status, respiratory status.  10 mL of


1% lidocaine subcutaneous utilized.  Under direct ultrasound guidance,


access gained into the abscess collection utilizing 18-gauge needle. 


Purulent fluid was aspirated and submitted for Gram stain and cultures. 


Guidewire was placed and visualized on ultrasound.  Fascial dilatation


performed, 8 French drain was placed within the fluid collection under


direct ultrasound guidance.  Aspirate yielded purulent fluid.  Specimen


submitted for Gram stain and cultures.  Catheter connected to external


drainage bag.  Retention suture and sterile dressing applied.  Patient


tolerated procedure well without evidence of complication.





IMPRESSION: 


  Ultrasound-guided drain placement within right lobe hepatic abscess. 


Cultures pending.











DICTATED BY: FELIPE WHITFIELD DO


DATE: 24 1606





ELECTRONICALLY SIGNED BY: FELIPE WHITFIELD DO


DATE: 24 1616








PLAN 


PER GI recs, EGD and colonoscopy in am


ABX per ID


Follow nephrology HD recs 





NEURO: 


Minimize central acting medications as possible. 


Maintain fall precautions, adequate lighting during the day





PULMONARY: 


Supplemental 02 as needed. 


Maintain aspiration precautions at all times


IS , nebs prn 





CARDIOVASCULAR: 


Follow hemodynamics. 


Vital signs per facility protocol


Several anti HTN medications at bedside, will reconcile into system . 


BP marginal only resume carvedilol and amlodine with parameters to hold


Hold Lasix due to septic state


Hold statin given abnormal Lfts 


Hydralazine Iv prn 





GI & NUTRITION:


Npo for procedure 


Consult General surgery and GI regarding Liver abscess. 


Ir for drainage 


Continue stool softeners and laxatives as needed.





KIDNEYS & ELECTROLYTES: 


HD per nephrology recs 





ENDOCRINE:


Maintain blood glucose between 100-180 at all times.


Hypoglycemia protocol in place





INFECTIOUS DISEASE: 


Trend temperature, WBC and procalcitonin level- zosyn 


Follow cultures, deescalate antibiotics as soon as possible.


Panculture if new onset fever





ONCOLOGY/HEMATOLOGY/COAGULATION: 


Monitor for s/s of bleeding


Monitor hemoglobin, coagulation studies as needed





SKIN:


Pressure ulcer prevention per facility protocol


Specialty mattress





ORTHO/REHAB:


Continue PT/OT 





Prophylaxis:


Continue GI and DVT prophylaxis





Code Status: 


Full Resuscitation





Disposition:


TBD





Other:


Total patient care time :  35 minutes











GIANNI BLOOD              Dec 13, 2024 16:27

## 2024-12-13 NOTE — NUR
RE: LIVER ABSCESS DRAIN PLACEMENT PLACED 12/12/24 AT 1520

PROCEDURE PERFORMED BY DR BANDAR WHITFIELD IN RADIOLOGY.  PUNCTURE SITE RUQ.  PATIENT TOLERATE 
PROCEDURE FAIR.  8.5FR  DRAIN PLACED TO LIVER ABSCESS COLLECTION AND SUTURED IN PLACE. 
DRESSING DRY AND INTACT.  SPECIMEN COLLECTED AND SENT TO LAB.  END OF PROCEDURE AT 1500.  
DILAUDID 2MG GIVEN FOR POST PROCEDURE PAIN MANAGEMENT.  REPORT GIVEN TO ALEJANDRO CAO LVN AND 
PATIENT TRANSPORTED TO [facility] VIA BED WITH O2 3L NC.  PATENT COMFORTABLE WITH NO C/O PAIN ON 
ARRIVAL TO ROOM.

## 2024-12-13 NOTE — PN
This is a 74-year-old female with concerns of hepatic abscess with recent IR 

drain placed





Interval history:


This 74-year-old female seen in her room resting


IR able to place percutaneous drain yesterday with no complications


WBCs 11hemoglobin stable.


Patient tolerating diet


No abdominal pain reported at time of exam patient is hemodynamically stable





Physical exam


General: Awake alert and oriented


Heart: Regular rate and rhythm}


Lungs: Clear to auscultation no distress


Abdomen: [Soft, nontender, nondistended percutaneous drain in place








Assessment :


This is a 74-year-old female status post percutaneous drain placed for hepatic 

abscess





Plan:


From surgical standpoint no further intervention planned


Patient to continue with antibiotic recommendations by primary team


Patient to follow up in outpatient setting for evaluation and removal of drain


Dr. Osborn to be updated in patient's status and surgical team sign off at this

 time.  Thank you


Vitals/Labs





Vital Signs








  Date Time  Temp Pulse Resp B/P (MAP) Pulse Ox O2 Delivery O2 Flow Rate FiO2


 


12/13/24 08:00 98.8 77 20 159/47 91 Room Air  


 


12/13/24 04:00       2.0 


 


12/12/24 19:10        32





Laboratory Tests


12/13/24 03:00








Medications





Current Medications


Acetaminophen 650 mg ONCE  ONCE PO Last administered on 12/10/24at 22:29;  Start

 12/10/24 at 22:00;  Stop 12/10/24 at 22:01;  Status DC


Ondansetron HCl 4 mg ONCE  ONCE IVP Last administered on 12/10/24at 22:28;  

Start 12/10/24 at 22:00;  Stop 12/10/24 at 22:01;  Status DC


Ceftriaxone Sodium 2 gm ONCE  ONCE IVPB Last administered on 12/10/24at 22:28;  

Start 12/10/24 at 22:00;  Stop 12/10/24 at 22:01;  Status DC


Piperacillin Sod/ Tazobactam Sod 3.375 gm Q12H IV Last administered on 

12/13/24at 01:44;  Start 12/11/24 at 00:30;  Stop 12/21/24 at 00:29


Vancomycin HCl 1 each AD IV;  Start 12/11/24 at 00:30;  Stop 12/25/24 at 00:29


Pharmacy Profile Note 1 each ONCE MISC;  Start 12/11/24 at 00:30;  Stop 12/11/24

 at 00:26;  Status DC


Albuterol Sulfate 2.5 mg [id number]  PRN IH;  Start 12/11/24 at 00:30;  Stop 

1/10/25 at 00:29


Vancomycin HCl 1 each AD IV;  Start 12/11/24 at 00:30;  Stop 12/11/24 at 00:25; 

 Status DC


Acetaminophen 650 mg Q6H  PRN PO Last administered on 12/12/24at 09:12;  Start 

12/11/24 at 00:30;  Stop 1/10/25 at 00:29


Lactulose 20 gm Q6H  PRN PO;  Start 12/11/24 at 00:30;  Stop 1/10/25 at 00:29


Ondansetron HCl 4 mg Q6H  PRN IVP;  Start 12/11/24 at 00:30;  Stop 1/10/25 at 

00:29


Hydralazine HCl 10 mg Q6H  PRN IV;  Start 12/11/24 at 00:30;  Stop 1/10/25 at 

00:29


Labetalol HCl 10 mg Q2H  PRN IV;  Start 12/11/24 at 00:30;  Stop 1/10/25 at 

00:29


Hydromorphone HCl 0.2 mg Q4H  PRN IVP Last administered on 12/12/24at 00:53;  

Start 12/11/24 at 00:30;  Stop 12/16/24 at 00:29


Vancomycin HCl 250 ml @  125 mls/hr ONCE  ONCE IV Last administered on 

12/11/24at 01:29;  Start 12/11/24 at 01:30;  Stop 12/11/24 at 03:29;  Status DC


Vancomycin HCl 750 mg TTHSPHD IVPB Last administered on 12/12/24at 17:25;  Start

 12/12/24 at 16:00;  Stop 12/22/24 at 15:59


Acetaminophen 650 mg ONCE  ONCE RC Last administered on 12/11/24at 05:44;  Start

 12/11/24 at 06:00;  Stop 12/11/24 at 06:01;  Status DC


Gadoterate Meglumine 10 mmol STK-MED ONCE IV;  Start 12/11/24 at 13:44;  Stop 

12/11/24 at 13:45;  Status DC


Vitamin B Complex/ Vit C/Folic Acid 1 cap DAILY PO Last administered on 

12/12/24at 09:11;  Start 12/12/24 at 09:00;  Stop 1/11/25 at 08:59


Sodium Chloride 1,000 ml @  0 mls/hr ONCE  ONCE IV Last administered on 

12/12/24at 06:11;  Start 12/12/24 at 06:00;  Stop 12/12/24 at 06:01;  Status DC


Aspirin 81 mg DAILY PO;  Start 12/13/24 at 09:00;  Stop 1/12/25 at 08:59


Carvedilol 3.125 mg BID PO Last administered on 12/12/24at 21:23;  Start 

12/12/24 at 21:00;  Stop 1/11/25 at 20:59


Cinacalcet 30 mg DAILY PO;  Start 12/13/24 at 09:00;  Stop 1/12/25 at 08:59


Gabapentin 100 mg DAILY PO;  Start 12/13/24 at 09:00;  Stop 1/12/25 at 08:59


Amlodipine Besylate 10 mg DAILY PO;  Start 12/13/24 at 09:00;  Stop 1/12/25 at 

08:59


Doxazosin Mesylate 1 mg HS PO Last administered on 12/12/24at 21:22;  Start 

12/12/24 at 21:00;  Stop 1/11/25 at 20:59


Vitamin B Complex/ Vit C/Folic Acid 1 cap DAILY PO;  Start 12/13/24 at 09:00;  

Stop 1/12/25 at 08:59


Levothyroxine Sodium 88 mcg SYN PO;  Start 12/13/24 at 06:30;  Stop 1/12/25 at 

06:29


Fentanyl Citrate 100 mcg STK-MED ONCE .ROUTE;  Start 12/12/24 at 14:04;  Stop 

12/12/24 at 14:05;  Status DC


Midazolam HCl 2 mg STK-MED ONCE .ROUTE;  Start 12/12/24 at 14:04;  Stop 12/12/24

 at 14:05;  Status DC


Hydromorphone HCl 2 mg STK-MED ONCE .ROUTE Last administered on 12/12/24at 

15:38;  Start 12/12/24 at 15:31;  Stop 12/12/24 at 15:32;  Status DC


Polyethylene Glycol/ Electrolytes 4,000 ml ONCE  ONCE PO Last administered on 

12/12/24at 17:25;  Start 12/12/24 at 16:00;  Stop 12/12/24 at 16:01;  Status DC











KESHIA CASAS Jr.       Dec 13, 2024 10:32

## 2024-12-13 NOTE — PN
GASTROENTEROLOGY PROGRESS NOTE








Date of Visit: Dec 13, 2024 


Time of Visit: 14:10





Events / Notes:


No acute events overnight.  Patient underwent IR drainage of liver abscess.





Review of Systems:


CONSTITUTIONAL: No malaise or change in sensation of wellbeing.


ENMT: No rhinorrhea, otorrhea, sinus pain, ear ache.


CARDIOVASCULAR: No angina, palpitations, orthopnea or paroxysmal dyspnea.


RESPIRATORY: No SOB.


GASTROINTESTINAL: No abdominal pain, nausea, vomiting, diarrhea, hematemesis, 

melena or change in the patient's habitual bowel movements consistency/number.


GENITOURINARY: No dysuria, hematuria or change in bladder continence.


MUSCULOSKELETAL: No new muscle pain or decrease in muscular strength. No new 

joint swelling, redness or tenderness.


SKIN: No new rash.





Physical Exam:


GEN: Awake, alert, oriented in person, time and place, and in no acute distress.


HEENT: No sinus tenderness. Tympanic membranes were not examined. No rhinorrhea.

Oral pharyngeal mucosa is pink, moist and within normal limits. Neck is supple 

with no cervical lymphadenopathy, thyromegaly or JVD.


CHEST: Inspection, palpation and percussion of the chest were unremarkable. Lung

auscultation revealed normal breath sounds bilaterally.


CARDIAC: PMI is within normal limits. Heart sounds are regular. Normal S1, S2. 

No gallop or murmur.


ABD: Soft, non-tender and not distended. No peritoneal signs on palpation. No 

organomegaly. Normal bowel sounds.


EXT: No cyanosis or clubbing. No edema.


SKIN: Intact. No rashes.


JOINTS: No evidence of synovitis or acute arthritis.


NEURO: Alert and oriented to name, place and person. Cranial nerve examination 

is unremarkable. No focal motor deficits. Normal speech. Gait is normal. 

Strength is normal.





                             Vital Signs (last 8hr)








  Date Time  Temp Pulse Resp B/P (MAP) Pulse Ox O2 Delivery O2 Flow Rate FiO2


 


12/13/24 12:00 98.8 63 19 153/55 94 Nasal Cannula 3.0 


 


12/13/24 08:00 98.8 77 20 159/47 91 Room Air  











Laboratory:


[ ]








                                   Laboratory:








Test


 12/13/24


11:13 12/13/24


03:00 12/12/24


15:00 12/12/24


13:43 Range/Units


 


 


Whole Blood Glucose 108       MG/DL


 


White Blood Count  11.1 H   4.8-10.8  K/uL


 


Red Blood Count


 


 2.98 L


 


 


 4.00-5.50


MIL/uL


 


Hemoglobin  10.3 L   12.0-16.0  g/dL


 


Hematocrit  30.6 L   36-48  %


 


Mean Corpuscular Volume  102.7 H   79-99  fL


 


Mean Corpuscular Hemoglobin  34.6 H   27.0-33.0  pg


 


Mean Corpuscular Hemoglobin


Concent 


 33.7 


 


 


 32.0-36.0  g/dL





 


Red Cell Distribution Width  12.5    11.0-15.5  %


 


Platelet Count  178    130-400  K/uL


 


Mean Platelet Volume  10.6 H   7.5-10.5  fL


 


Immature Granulocyte % (Auto)  1.3 H   0-1  %


 


Neutrophils (%) (Auto)  83.7 H   40.0-77.0  %


 


Lymphocytes (%) (Auto)  5.5 L   21.0-51.0  %


 


Monocytes (%) (Auto)  9.2    3.0-13.0  %


 


Eosinophils (%) (Auto)  0.0    0.0-8.0  %


 


Basophils (%) (Auto)  0.3    0.0-5.0  %


 


Neutrophils # (Auto)  9.3 H   1.8-7.7  K/uL


 


Lymphocytes # (Auto)  0.6 L   1.0-4.8  K/uL


 


Monocytes # (Auto)  1.0    0.1-1.0  K/uL


 


Eosinophils # (Auto)  0.00    0.00-0.70  K/uL


 


Basophils # (Auto)  0.03    0.00-0.20  K/uL


 


Absolute Immature Granulocyte


(auto 


 0.14 


 


 


 0-1  K/uL





 


Nucleated Red Blood Cells  0.2 H   0.0-0.19  %


 


Prothrombin Time  11.9 H   9.6-11.6  SEC


 


Prothromb Time International


Ratio 


 1.11 


 


 


 0.85-1.15  





 


Sodium Level  141    136-145  mmol/L


 


Potassium Level  4.1    3.5-5.1  mmol/L


 


Chloride Level  99 L   101-111  mmol/L


 


Carbon Dioxide Level  30    21-32  mmol/L


 


Blood Urea Nitrogen  45 H   7-18  mg/dL


 


Creatinine  5.0 H   0.5-1.0  mg/dL


 


Glomerular Filtration Rate


Calc 


 9 


 


 


 >90  mL/min





 


Random Glucose  139 H     mg/dL


 


Total Calcium  8.0 L   8.5-10.1  mg/dL


 


Phosphorus Level  7.1 H   2.5-4.9  mg/dL


 


Magnesium Level


 


 2.10 


 


 


 1.80-2.40


mg/dL


 


Total Bilirubin  0.9    0.2-1.0  mg/dL


 


Aspartate Amino Transf


(AST/SGOT) 


 41 H


 


 


 10-37  U/L





 


Alanine Aminotransferase


(ALT/SGPT) 


 57 


 


 


 12-78  U/L





 


Alkaline Phosphatase  262 H     U/L


 


Total Protein  7.0    6.0-8.3  g/dL


 


Albumin  2.4 L   3.5-5.0  g/dL


 


Body Fluid Source   ASPIRATE    


 


Body Fluid Volume   9    mL


 


Body Fluid Color   BROWN H  LT YELLOW  


 


Body Fluid Supernatant


Appearance 


 


 TURBID H


 


 CLEAR  





 


Body Fluid WBC   756955    /cu. mm.


 


Body Fluid RBC   79442    /cu. mm.


 


Body Fluid Neutrophils   79.0    %


 


Body Fluid Lymphocytes   21    %


 


Body Fluid ALT   966    U/L


 


Body Fluid Total Bilirubin   6.8    mg/dL


 


Body Fluid Lactate


Dehydrogenase 


 


 46482 


 


  U/L





 


Activated Partial


Thromboplast Time 


 


 


 28.2 


 26.3-35.5  SEC





 


Test


 12/12/24


06:45 12/12/24


03:54 


 


 Range/Units


 


 


Hepatitis B Surface Antigen. Non-Reactive     Nonreactive  


 


Hepatitis B Surface Antibody. Negative L    Reactive  


 


Hepatitis B Core Total


Antibody. Non-Reactive 


 


 


 


 Nonreactive  





 


Thyroid Stimulating Hormone


(TSH) 


 1.11 #


 


 


 0.36-3.74


uIU/mL











                               Current Medications








 Medications


  (Trade)  Dose


 Ordered  Sig/Adenike


 Route


 PRN Reason  Start Time


 Stop Time Status Last Admin


Dose Admin


 


 Acetaminophen


  (TYLenol 325MG


 TAB)  650 mg  Q6H  PRN


 PO


 FEVER/MILD PAIN LEVEL 1-3  12/11/24 00:30


 1/10/25 00:29  12/12/24 09:12


650 MG


 


 Albuterol Sulfate


  (Proventil


 0.083% 2.5mg/3ml)  2.5 mg  [id number]  PRN


 IH


 SHORTNESS OF BREATH  12/11/24 00:30


 1/10/25 00:29   





 


 Amlodipine


 Besylate


  (NorvASC 5MG TAB)  10 mg  DAILY


 PO


   12/13/24 09:00


 1/12/25 08:59   





 


 Aspirin


  (Aspirin 81mg


 Chew Tab)  81 mg  DAILY


 PO


   12/13/24 09:00


 1/12/25 08:59   





 


 Carvedilol


  (Coreg 3.125MG)  3.125 mg  BID


 PO


   12/12/24 21:00


 1/11/25 20:59  12/12/24 21:23


3.125 MG


 


 Cinacalcet


  (Sensipar 30mg


 Tab)  30 mg  DAILY


 PO


   12/13/24 09:00


 1/12/25 08:59   





 


 Doxazosin Mesylate


  (Doxazosin


 Mesylate)  1 mg  HS


 PO


   12/12/24 21:00


 1/11/25 20:59  12/12/24 21:22


1 MG


 


 Gabapentin


  (NEURontin 100


 mg CAP)  100 mg  DAILY


 PO


   12/13/24 09:00


 1/12/25 08:59   





 


 Hydralazine HCl


  (APRESOLine 20MG


 INJ)  10 mg  Q6H  PRN


 IV


 SBP GREATER THAN 180  12/11/24 00:30


 1/10/25 00:29   





 


 Hydromorphone HCl


  (DiLAUDid 0.5MG


 INJ)  0.2 mg  Q4H  PRN


 IVP


 SEVERE PAIN (7-10)  12/11/24 00:30


 12/16/24 00:29  12/12/24 00:53


0.2 MG


 


 Labetalol HCl


  (TRANdate 20MG


 SYG)  10 mg  Q2H  PRN


 IV


 SBP GREATER THAN 180  12/11/24 00:30


 1/10/25 00:29   





 


 Lactulose


  (Constulose 20gm/


 30ml Udcup)  20 gm  Q6H  PRN


 PO


 CONSTIPATION  12/11/24 00:30


 1/10/25 00:29   





 


 Levothyroxine


 Sodium


  (SYNTHroid 88MCG


 TAB)  88 mcg  SYN


 PO


   12/13/24 06:30


 1/12/25 06:29   





 


 Ondansetron HCl


  (zoFRAN 4MG INJ)  4 mg  Q6H  PRN


 IVP


 NAUSEA/VOMITING  12/11/24 00:30


 1/10/25 00:29   





 


 Pharmacy Profile


 Note


  (Pharmacy


 Communication)  1 each  ONCE


 MISC


   12/11/24 00:30


 12/11/24 00:26 DC  





 


 Piperacillin Sod/


 Tazobactam Sod


  (Zosyn


 3.375gm+NS 50ml)  3.375 gm  Q12H


 IV


   12/11/24 00:30


 12/21/24 00:29  12/13/24 13:33


3.375 GM


 


 Vancomycin HCl


  (Vancomycin


 750mg)  750 mg  TTHSPHD


 IVPB


   12/12/24 16:00


 12/22/24 15:59  12/12/24 17:25


750 MG


 


 Vancomycin HCl


  (Vancomycin


 Protocol)  1 each  AD


 IV


   12/11/24 00:30


 12/11/24 00:25 DC  





 


 Vancomycin HCl


  (Vancomycin


 Protocol)  1 each  AD


 IV


   12/11/24 00:30


 12/25/24 00:29   





 


 Vitamin B Complex/


 Vit C/Folic Acid


  (Nephrovite


 Tablet)  1 cap  DAILY


 PO


   12/12/24 09:00


 1/11/25 08:59  12/12/24 09:11


1 CAP


 


 Vitamin B Complex/


 Vit C/Folic Acid


  (Nephrovite


 Tablet)  1 cap  DAILY


 PO


   12/13/24 09:00


 1/12/25 08:59   














Diagnostics / Radiology:


[COPY/PASTE HERE IF NO REPORTS PLEASE DELETE SECTION]





Assessment:  


Liver abscess


Abdominal pain





Plan: 


Tentative plan for egd/colonoscopy in [facility]            Dec 13, 2024 14:10

## 2024-12-14 VITALS
RESPIRATION RATE: 18 BRPM | SYSTOLIC BLOOD PRESSURE: 164 MMHG | TEMPERATURE: 98.6 F | HEART RATE: 63 BPM | DIASTOLIC BLOOD PRESSURE: 59 MMHG | OXYGEN SATURATION: 94 %

## 2024-12-14 VITALS
TEMPERATURE: 98.8 F | HEART RATE: 71 BPM | RESPIRATION RATE: 18 BRPM | SYSTOLIC BLOOD PRESSURE: 150 MMHG | DIASTOLIC BLOOD PRESSURE: 52 MMHG

## 2024-12-14 VITALS
SYSTOLIC BLOOD PRESSURE: 151 MMHG | RESPIRATION RATE: 19 BRPM | HEART RATE: 60 BPM | DIASTOLIC BLOOD PRESSURE: 58 MMHG | TEMPERATURE: 97.5 F

## 2024-12-14 VITALS
RESPIRATION RATE: 16 BRPM | SYSTOLIC BLOOD PRESSURE: 158 MMHG | TEMPERATURE: 97.5 F | DIASTOLIC BLOOD PRESSURE: 63 MMHG | HEART RATE: 60 BPM

## 2024-12-14 VITALS — RESPIRATION RATE: 16 BRPM | HEART RATE: 63 BPM | SYSTOLIC BLOOD PRESSURE: 98 MMHG | DIASTOLIC BLOOD PRESSURE: 53 MMHG

## 2024-12-14 VITALS — SYSTOLIC BLOOD PRESSURE: 145 MMHG | DIASTOLIC BLOOD PRESSURE: 63 MMHG | HEART RATE: 74 BPM | RESPIRATION RATE: 16 BRPM

## 2024-12-14 VITALS
TEMPERATURE: 97.5 F | OXYGEN SATURATION: 94 % | SYSTOLIC BLOOD PRESSURE: 145 MMHG | DIASTOLIC BLOOD PRESSURE: 49 MMHG | RESPIRATION RATE: 18 BRPM | HEART RATE: 63 BPM

## 2024-12-14 VITALS
SYSTOLIC BLOOD PRESSURE: 149 MMHG | TEMPERATURE: 98.2 F | DIASTOLIC BLOOD PRESSURE: 54 MMHG | HEART RATE: 62 BPM | RESPIRATION RATE: 17 BRPM

## 2024-12-14 VITALS — HEART RATE: 56 BPM | DIASTOLIC BLOOD PRESSURE: 55 MMHG | SYSTOLIC BLOOD PRESSURE: 124 MMHG | RESPIRATION RATE: 16 BRPM

## 2024-12-14 VITALS — HEART RATE: 60 BPM | SYSTOLIC BLOOD PRESSURE: 155 MMHG | DIASTOLIC BLOOD PRESSURE: 61 MMHG | RESPIRATION RATE: 18 BRPM

## 2024-12-14 VITALS — DIASTOLIC BLOOD PRESSURE: 59 MMHG | RESPIRATION RATE: 19 BRPM | SYSTOLIC BLOOD PRESSURE: 158 MMHG | HEART RATE: 60 BPM

## 2024-12-14 VITALS — SYSTOLIC BLOOD PRESSURE: 164 MMHG | RESPIRATION RATE: 18 BRPM | HEART RATE: 63 BPM | DIASTOLIC BLOOD PRESSURE: 69 MMHG

## 2024-12-14 VITALS — DIASTOLIC BLOOD PRESSURE: 53 MMHG | RESPIRATION RATE: 16 BRPM | HEART RATE: 63 BPM | SYSTOLIC BLOOD PRESSURE: 151 MMHG

## 2024-12-14 VITALS — RESPIRATION RATE: 18 BRPM | HEART RATE: 62 BPM | SYSTOLIC BLOOD PRESSURE: 161 MMHG | DIASTOLIC BLOOD PRESSURE: 67 MMHG

## 2024-12-14 VITALS — DIASTOLIC BLOOD PRESSURE: 58 MMHG | SYSTOLIC BLOOD PRESSURE: 135 MMHG | RESPIRATION RATE: 18 BRPM | HEART RATE: 58 BPM

## 2024-12-14 VITALS — HEART RATE: 60 BPM | RESPIRATION RATE: 18 BRPM | SYSTOLIC BLOOD PRESSURE: 157 MMHG | DIASTOLIC BLOOD PRESSURE: 62 MMHG

## 2024-12-14 VITALS — SYSTOLIC BLOOD PRESSURE: 150 MMHG | RESPIRATION RATE: 16 BRPM | DIASTOLIC BLOOD PRESSURE: 69 MMHG | HEART RATE: 60 BPM

## 2024-12-14 VITALS — SYSTOLIC BLOOD PRESSURE: 104 MMHG | RESPIRATION RATE: 16 BRPM | DIASTOLIC BLOOD PRESSURE: 47 MMHG | HEART RATE: 60 BPM

## 2024-12-14 VITALS
SYSTOLIC BLOOD PRESSURE: 135 MMHG | HEART RATE: 68 BPM | DIASTOLIC BLOOD PRESSURE: 50 MMHG | TEMPERATURE: 98.3 F | RESPIRATION RATE: 18 BRPM

## 2024-12-14 VITALS — HEART RATE: 64 BPM | RESPIRATION RATE: 16 BRPM | DIASTOLIC BLOOD PRESSURE: 69 MMHG | SYSTOLIC BLOOD PRESSURE: 164 MMHG

## 2024-12-14 VITALS — DIASTOLIC BLOOD PRESSURE: 57 MMHG | SYSTOLIC BLOOD PRESSURE: 155 MMHG | RESPIRATION RATE: 18 BRPM | HEART RATE: 59 BPM

## 2024-12-14 VITALS
TEMPERATURE: 98.1 F | SYSTOLIC BLOOD PRESSURE: 151 MMHG | DIASTOLIC BLOOD PRESSURE: 80 MMHG | HEART RATE: 75 BPM | OXYGEN SATURATION: 96 % | RESPIRATION RATE: 18 BRPM

## 2024-12-14 VITALS — DIASTOLIC BLOOD PRESSURE: 53 MMHG | HEART RATE: 67 BPM | SYSTOLIC BLOOD PRESSURE: 146 MMHG | RESPIRATION RATE: 21 BRPM

## 2024-12-14 VITALS — RESPIRATION RATE: 16 BRPM | SYSTOLIC BLOOD PRESSURE: 165 MMHG | DIASTOLIC BLOOD PRESSURE: 91 MMHG | HEART RATE: 57 BPM

## 2024-12-14 VITALS — HEART RATE: 56 BPM | RESPIRATION RATE: 16 BRPM | DIASTOLIC BLOOD PRESSURE: 55 MMHG | SYSTOLIC BLOOD PRESSURE: 124 MMHG

## 2024-12-14 VITALS — RESPIRATION RATE: 18 BRPM | SYSTOLIC BLOOD PRESSURE: 166 MMHG | HEART RATE: 58 BPM | DIASTOLIC BLOOD PRESSURE: 61 MMHG

## 2024-12-14 VITALS — DIASTOLIC BLOOD PRESSURE: 59 MMHG | SYSTOLIC BLOOD PRESSURE: 156 MMHG | RESPIRATION RATE: 19 BRPM | HEART RATE: 62 BPM

## 2024-12-14 VITALS
SYSTOLIC BLOOD PRESSURE: 149 MMHG | TEMPERATURE: 98.6 F | HEART RATE: 55 BPM | RESPIRATION RATE: 18 BRPM | DIASTOLIC BLOOD PRESSURE: 73 MMHG

## 2024-12-14 VITALS — HEART RATE: 68 BPM | RESPIRATION RATE: 16 BRPM | DIASTOLIC BLOOD PRESSURE: 59 MMHG | SYSTOLIC BLOOD PRESSURE: 123 MMHG

## 2024-12-14 VITALS — HEART RATE: 61 BPM | SYSTOLIC BLOOD PRESSURE: 148 MMHG | DIASTOLIC BLOOD PRESSURE: 70 MMHG | RESPIRATION RATE: 18 BRPM

## 2024-12-14 VITALS
HEART RATE: 66 BPM | RESPIRATION RATE: 16 BRPM | DIASTOLIC BLOOD PRESSURE: 53 MMHG | TEMPERATURE: 97.7 F | SYSTOLIC BLOOD PRESSURE: 151 MMHG

## 2024-12-14 VITALS — HEART RATE: 62 BPM | OXYGEN SATURATION: 96 % | RESPIRATION RATE: 18 BRPM

## 2024-12-14 VITALS — DIASTOLIC BLOOD PRESSURE: 64 MMHG | SYSTOLIC BLOOD PRESSURE: 164 MMHG | HEART RATE: 64 BPM | RESPIRATION RATE: 16 BRPM

## 2024-12-14 VITALS — RESPIRATION RATE: 18 BRPM | DIASTOLIC BLOOD PRESSURE: 66 MMHG | SYSTOLIC BLOOD PRESSURE: 177 MMHG | HEART RATE: 66 BPM

## 2024-12-14 VITALS — RESPIRATION RATE: 16 BRPM | SYSTOLIC BLOOD PRESSURE: 117 MMHG | DIASTOLIC BLOOD PRESSURE: 60 MMHG | HEART RATE: 68 BPM

## 2024-12-14 VITALS — DIASTOLIC BLOOD PRESSURE: 59 MMHG | RESPIRATION RATE: 18 BRPM | SYSTOLIC BLOOD PRESSURE: 123 MMHG | TEMPERATURE: 98.1 F

## 2024-12-14 VITALS — HEART RATE: 68 BPM | DIASTOLIC BLOOD PRESSURE: 58 MMHG | RESPIRATION RATE: 16 BRPM | SYSTOLIC BLOOD PRESSURE: 135 MMHG

## 2024-12-14 VITALS — SYSTOLIC BLOOD PRESSURE: 125 MMHG | DIASTOLIC BLOOD PRESSURE: 59 MMHG | HEART RATE: 61 BPM | RESPIRATION RATE: 16 BRPM

## 2024-12-14 VITALS — RESPIRATION RATE: 21 BRPM | SYSTOLIC BLOOD PRESSURE: 159 MMHG | HEART RATE: 59 BPM | DIASTOLIC BLOOD PRESSURE: 57 MMHG

## 2024-12-14 VITALS
HEART RATE: 56 BPM | RESPIRATION RATE: 16 BRPM | DIASTOLIC BLOOD PRESSURE: 61 MMHG | TEMPERATURE: 97.7 F | SYSTOLIC BLOOD PRESSURE: 155 MMHG

## 2024-12-14 LAB
ALBUMIN SERPL-MCNC: 2.1 G/DL (ref 3.5–5)
ALT SERPL-CCNC: 43 U/L (ref 12–78)
AST SERPL-CCNC: 22 U/L (ref 10–37)
BASOPHILS # BLD AUTO: 0.03 K/UL (ref 0–0.2)
BASOPHILS NFR BLD AUTO: 0.4 % (ref 0–5)
BILIRUB SERPL-MCNC: 0.6 MG/DL (ref 0.2–1)
BUN SERPL-MCNC: 52 MG/DL (ref 7–18)
CHLORIDE SERPL-SCNC: 101 MMOL/L (ref 101–111)
CO2 SERPL-SCNC: 31 MMOL/L (ref 21–32)
CREAT SERPL-MCNC: 5.5 MG/DL (ref 0.5–1)
EOSINOPHIL # BLD AUTO: 0.08 K/UL (ref 0–0.7)
EOSINOPHIL NFR BLD AUTO: 1.1 % (ref 0–8)
ERYTHROCYTE [DISTWIDTH] IN BLOOD BY AUTOMATED COUNT: 12.4 % (ref 11–15.5)
GFR SERPL CREATININE-BSD FRML MDRD: 8 ML/MIN (ref 90–?)
GLUCOSE SERPL-MCNC: 89 MG/DL (ref 70–105)
HCT VFR BLD AUTO: 28.6 % (ref 36–48)
IMM GRANULOCYTES # BLD: 0.14 K/UL (ref 0–1)
LYMPHOCYTES # SPEC AUTO: 1 K/UL (ref 1–4.8)
LYMPHOCYTES NFR SPEC AUTO: 14.5 % (ref 21–51)
MCH RBC QN AUTO: 34.5 PG (ref 27–33)
MCHC RBC AUTO-ENTMCNC: 33.2 G/DL (ref 32–36)
MCV RBC AUTO: 104 FL (ref 79–99)
MONOCYTES # BLD AUTO: 0.6 K/UL (ref 0.1–1)
MONOCYTES NFR BLD AUTO: 8.9 % (ref 3–13)
NEUTROPHILS # BLD AUTO: 5.2 K/UL (ref 1.8–7.7)
NEUTROPHILS NFR BLD AUTO: 73.1 % (ref 40–77)
NRBC BLD MANUAL-RTO: 0.3 % (ref 0–0.19)
PHOSPHATE SERPL-MCNC: 5.9 MG/DL (ref 2.5–4.9)
PLATELET # BLD AUTO: 176 K/UL (ref 130–400)
POTASSIUM SERPL-SCNC: 3.5 MMOL/L (ref 3.5–5.1)
PROT SERPL-MCNC: 6.2 G/DL (ref 6–8.3)
RBC # BLD AUTO: 2.75 MIL/UL (ref 4–5.5)
SODIUM SERPL-SCNC: 146 MMOL/L (ref 136–145)
WBC # BLD AUTO: 7.1 K/UL (ref 4.8–10.8)

## 2024-12-14 PROCEDURE — 5A1D70Z PERFORMANCE OF URINARY FILTRATION, INTERMITTENT, LESS THAN 6 HOURS PER DAY: ICD-10-PCS | Performed by: INTERNAL MEDICINE

## 2024-12-14 PROCEDURE — 0DBM8ZX EXCISION OF DESCENDING COLON, VIA NATURAL OR ARTIFICIAL OPENING ENDOSCOPIC, DIAGNOSTIC: ICD-10-PCS | Performed by: INTERNAL MEDICINE

## 2024-12-14 PROCEDURE — 0DBK8ZX EXCISION OF ASCENDING COLON, VIA NATURAL OR ARTIFICIAL OPENING ENDOSCOPIC, DIAGNOSTIC: ICD-10-PCS | Performed by: INTERNAL MEDICINE

## 2024-12-14 PROCEDURE — 0DB68ZX EXCISION OF STOMACH, VIA NATURAL OR ARTIFICIAL OPENING ENDOSCOPIC, DIAGNOSTIC: ICD-10-PCS | Performed by: INTERNAL MEDICINE

## 2024-12-14 RX ADMIN — SODIUM CHLORIDE SCH MLS/HR: 0.9 INJECTION, SOLUTION INTRAVENOUS at 12:17

## 2024-12-14 NOTE — PN
INFECTIOUS DISEASE  PROGRESS NOTE








Date of Service: Dec 14, 2024





SUBJECTIVE:


This is a 74-year-old female patient with past medical history of diabetes 

mellitus and end-stage renal disease, on dialysis who presented to the hospital 

with chief complaint of generalized body weakness, and right upper abdominal 

quadrant pain.  An MRCP showed fluid collection in the anterior segment of the 

right lobe of the liver consistent with abscess and the reason for this consult.


Patient was seen and examined at bedside in room 330.  Patient is status post 

percutaneous drain placement on 12/12/2024.  The WBC has trended down to a 

normal level of 7.1 and patient remains afebrile with a temperature 98.1.  

Preliminary aspirate cultures growing Gram-negative rods.  We will follow up on 

the final cultures.  We will continue on vancomycin per pharmacy protocol and 

Zosyn IV.  Patient is scheduled to be dialyzed today. We will continue to 

monitor patient's care.





PHYSICAL EXAM


EYES:  Anicteric.  Pupils equal and reactive.


HENT: No oral thrush seen, moist Oral mucosa.


NECK:  Supple, no JVD or thyromegaly.


LUNGS:  Good air entry.  No rales, no rhonchi.


CARDIOVASCULAR:  S1, S2 regular.  No murmur heard.


ABDOMEN:  Soft, non tender, bowel sounds present, no organomegaly.  Right 

percutaneous drainage catheter.


CENTRAL NERVOUS SYSTEM:  Awake, alert, oriented x 3.  


SKIN:  No rashes, no swelling.


LYMPHATICS: No peripheral lymphadenopathy.


MUSCULOSKELETAL:  No joint swelling, erythema or tenderness.


EXTREMITIES:  No cyanosis or clubbing.


BACK:  No deformity, no pressure ulcer.


GENITOURINARY:  No dysuria or hematuria.








Vital Sign (Last 12 Hours)








 12/14/24 12/14/24 12/14/24 12/14/24





 07:07 07:07 07:35 07:35


 


Temp    97.5


 


Pulse   60 63


 


Resp   18 19


 


B/P (MAP)    145/49


 


Pulse Ox    91


 


O2 Delivery Mask Mask N/Cannula Low lpm SIMPLE FACE MASK


 


O2 Flow Rate 10.0  2.0 10.0


 


FiO2   28 


 


    





 12/14/24 12/14/24 12/14/24 12/14/24





 07:40 07:45 07:50 07:55


 


Pulse 67 62 60 59


 


Resp 21 19 19 18


 


B/P (MAP) 146/53 156/59 158/59 155/57


 


Pulse Ox 95 95 94 94


 


O2 Delivery SIMPLE FACE MASK SIMPLE FACE MASK Nasal Cannula Nasal Cannula


 


O2 Flow Rate 10.0 10.0 4.0 4.0





 12/14/24 12/14/24 12/14/24 12/14/24





 08:00 08:05 08:10 08:25


 


Temp  97.5 98.6 98.6


 


Pulse 59 60 63 55


 


Resp 21 19 18 18


 


B/P (MAP) 159/57 151/58 164/59 149/73


 


Pulse Ox 95 95 96 94


 


O2 Delivery Nasal Cannula Nasal Cannula Room Air Nasal Cannula


 


O2 Flow Rate 4.0 4.0  4.0


 


    





 12/14/24 12/14/24 12/14/24 12/14/24





 08:40 08:49 08:55 09:10


 


Temp  97.7  


 


Pulse 66 56 60 60


 


Resp 18 16 18 18


 


B/P (MAP) 177/66 155/61 155/61 157/62


 


Pulse Ox 94  94 94


 


O2 Delivery Nasal Cannula Room Air Nasal Cannula Nasal Cannula


 


O2 Flow Rate 4.0  4.0 4.0


 


    





 12/14/24 12/14/24 12/14/24 12/14/24





 09:25 09:30 09:40 09:45


 


Temp 97.7   


 


Pulse 66 63 58 64


 


Resp 16 16 18 16


 


B/P (MAP) 151/53 151/53 166/61 164/64


 


Pulse Ox   94 


 


O2 Delivery Nasal Cannula Nasal Cannula Nasal Cannula Nasal Cannula


 


O2 Flow Rate 4.0 4.0 4.0 4.0


 


    





 12/14/24 12/14/24 12/14/24 12/14/24





 10:00 10:10 10:15 10:30


 


Pulse 57 63 64 61


 


Resp 16 18 16 16


 


B/P (MAP) 165/91 164/69 164/69 125/59


 


Pulse Ox 94 95 94 94


 


O2 Delivery Nasal Cannula Nasal Cannula Nasal Cannula Nasal Cannula


 


O2 Flow Rate 4.0 4.0 4.0 4.0





 12/14/24 12/14/24 12/14/24 12/14/24





 10:45 11:00 11:10 11:15


 


Temp   98.1 


 


Pulse 74 68  68


 


Resp 16 16 18 16


 


B/P (MAP) 145/63 117/60 123/59 123/59


 


Pulse Ox 94 94 95 94


 


O2 Delivery Nasal Cannula Nasal Cannula Nasal Cannula Nasal Cannula


 


O2 Flow Rate 4.0 4.0 4.0 4.0


 


    





 12/14/24 12/14/24 12/14/24 12/14/24





 11:30 11:35 11:45 12:00


 


Pulse 63 60 56 56


 


Resp 16 16 16 16


 


B/P (MAP) 98/53 104/47 124/55 124/55


 


Pulse Ox 94 94 94 94


 


O2 Delivery Nasal Cannula Nasal Cannula Nasal Cannula Nasal Cannula


 


O2 Flow Rate 4.0 4.0 4.0 4.0





 12/14/24 12/14/24 12/14/24 12/14/24





 12:10 12:15 12:30 13:10


 


Pulse 58 68 60 61


 


Resp 18 16 16 18


 


B/P (MAP) 135/58 135/58 150/69 148/70


 


Pulse Ox 94 94 94 96


 


O2 Delivery Nasal Cannula Nasal Cannula Nasal Cannula Nasal Cannula


 


O2 Flow Rate 4.0 4.0 4.0 4.0





 12/14/24 12/14/24  





 14:10 14:19  


 


Temp  97.5  


 


Pulse 62 60  


 


Resp 18 16  


 


B/P (MAP) 161/67 158/63  


 


Pulse Ox 96   


 


O2 Delivery Nasal Cannula Room Air  


 


O2 Flow Rate 4.0   














Intake & Output (last 24hrs)   


 


 12/13/24 12/13/24 12/14/24





 15:00 23:00 07:00


 


Intake Total   4000 ml


 


Balance   4000 ml











LABS:








                                   Laboratory:








Test


 12/14/24


11:16 12/14/24


04:31 12/13/24


03:00 Range/Units


 


 


Whole Blood Glucose 107      MG/DL


 


White Blood Count  7.1   4.8-10.8  K/uL


 


Red Blood Count


 


 2.75 L


 


 4.00-5.50


MIL/uL


 


Hemoglobin  9.5 L  12.0-16.0  g/dL


 


Hematocrit  28.6 L  36-48  %


 


Mean Corpuscular Volume  104.0 H  79-99  fL


 


Mean Corpuscular Hemoglobin  34.5 H  27.0-33.0  pg


 


Mean Corpuscular Hemoglobin


Concent 


 33.2 


 


 32.0-36.0  g/dL





 


Red Cell Distribution Width  12.4   11.0-15.5  %


 


Platelet Count  176   130-400  K/uL


 


Mean Platelet Volume  10.5   7.5-10.5  fL


 


Immature Granulocyte % (Auto)  2.0 H  0-1  %


 


Neutrophils (%) (Auto)  73.1   40.0-77.0  %


 


Lymphocytes (%) (Auto)  14.5 L  21.0-51.0  %


 


Monocytes (%) (Auto)  8.9   3.0-13.0  %


 


Eosinophils (%) (Auto)  1.1   0.0-8.0  %


 


Basophils (%) (Auto)  0.4   0.0-5.0  %


 


Neutrophils # (Auto)  5.2   1.8-7.7  K/uL


 


Lymphocytes # (Auto)  1.0   1.0-4.8  K/uL


 


Monocytes # (Auto)  0.6   0.1-1.0  K/uL


 


Eosinophils # (Auto)  0.08   0.00-0.70  K/uL


 


Basophils # (Auto)  0.03   0.00-0.20  K/uL


 


Absolute Immature Granulocyte


(auto 


 0.14 


 


 0-1  K/uL





 


Nucleated Red Blood Cells  0.3 H  0.0-0.19  %


 


Sodium Level  146 H  136-145  mmol/L


 


Potassium Level  3.5   3.5-5.1  mmol/L


 


Chloride Level  101   101-111  mmol/L


 


Carbon Dioxide Level  31   21-32  mmol/L


 


Blood Urea Nitrogen  52 H  7-18  mg/dL


 


Creatinine  5.5 H  0.5-1.0  mg/dL


 


Glomerular Filtration Rate


Calc 


 8 


 


 >90  mL/min





 


Random Glucose  89     mg/dL


 


Total Calcium  8.2 L  8.5-10.1  mg/dL


 


Phosphorus Level  5.9 H  2.5-4.9  mg/dL


 


Total Bilirubin  0.6   0.2-1.0  mg/dL


 


Aspartate Amino Transf


(AST/SGOT) 


 22 


 


 10-37  U/L





 


Alanine Aminotransferase


(ALT/SGPT) 


 43 


 


 12-78  U/L





 


Alkaline Phosphatase  249 H    U/L


 


Total Protein  6.2   6.0-8.3  g/dL


 


Albumin  2.1 L  3.5-5.0  g/dL


 


Prothrombin Time   11.9 H 9.6-11.6  SEC


 


Prothromb Time International


Ratio 


 


 1.11 


 0.85-1.15  





 


Magnesium Level


 


 


 2.10 


 1.80-2.40


mg/dL














ASSESSMENT:


Liver abscess status post percutaneous drain placement on 12/12/2024.  


Leukocytosis, resolving.


End-stage renal disease, on dialysis.  


Diabetes mellitus.  


Anemia.  





PLAN:


Continue vancomycin per pharmacy protocol.  


Continue Zosyn IV.


Continue pain management.


We will follow up on the final aspirate culture results.


Monitoring drain output.


Monitor electrolytes.


Continue dialysis as recommended by nephrologist.





This case was reviewed and discussed with my supervising physician and the above

assessment and plan was formulated and agreed upon.


ATTESTATION BY PHYSICIAN


I have seen and examined the patient. I reviewed the documentation, medical 

decision making, and treatment plan as noted by the mid-level provider above. I 

agree with the findings and plan of care.











DULCE MARIA GARZA MD, [facility]             Dec 14, 2024 16:05

## 2024-12-14 NOTE — PN
[facility] PROGRESS NOTE 





Date Patient Seen: Dec 14, 2024 


Time of Visit: 11:30


Supervising Physician: [Ceferino Barron MD





Primary Care Physician: [Dr. Delgado Isaacs ]


Outpatient Specialists: [ Dr. Swan, nephro ]]


Inpatient Consults: [Dr. Ramirez, surgery, Dr. Swan, nephro ]





PROBLEM LIST:


Sepsis secondary to 3x3.4 Hepatic Abscess 


   S/P IR drain placement within rt lobe hepatic abscess cultures pending 

12/13/24   


ESRD HD TT HS 


Chronic Congestive Diastolic Heart Failure , Echo 2023 EF of 65% 


Primary hypertension 


HLD 


Diabetes mellitus II Hg A1c of 7.2 % from 2019


LA grade B reflux esophagitis w/ no bleeding on EGD 12/14/24


non bleeding gastric ulcers on EGD 12/14/24


2 mm polyp in cecum s/p resection on colonoscopy 12/14/24


3-5mm polyp in descending colon and transverse colon on colonoscopy 12/14/24


none bleeding internal hemorrhoids on colonoscopy 12/14/24





INTERVAL HISTORY:


12/13-patient awaiting IR for aspiration and drain placement as recommended per 

General surgery for this morning.  GI has planned EGD and colonoscopy for 

tomorrow morning.  No further fevers in the last 24 hours with a T-max of 98.8,

hemodynamically stable in no apparent respiratory distress respiratory rate of 

19 saturating 94% with 3 L via nasal cannula.  Patient has good urine output and

a bowel movement today.  White count trending down today 11.1 H&H is 10.3/30.6 

slightly improved from yesterday which was 9.8/29.5 platelet count is normal.  

On chemistry chloride 99 BUN is 45 creatinine is 5.0 with a GFR of 9.  Total 

calcium is eight phosphorus 7.1 AST is 41 alkaline phosphatase 262 albumin 2.4. 

Antibiotics per ID.





12/14/24-  pt is s/p EGD and colonoscopy in which they found all laid grade B 

reflux esophagitis with no bleed.  Nonbleeding gastric ulcers.  Normal duodenum.

 Colonoscopy 2 mm polyp in 2nd resected, 3-5 mm polyp in the descending colon 

and transverse colon.  Nonbleeding internal hemorrhoids also noted per report.  

We will advance diet today as tolerated and possibly DC in the next 24-48 hours.





REVIEW OF SYSTEMS: 


12 point ROS reviewed with patient. Pertinent positives mentioned above. 

Otherwise negative.





PHYSICAL EXAM: 


GENERAL: alert, weak, awake oriented x 3


HEENT: EOMI, Sclera non icteric, moist mucosa 


NECK: Supple, no JVD, trachea midline 


LUNGS: Clear breath sounds bilaterally. No wheezes


HEART: Regular rate and rhythm. Normal S1 and S2, without murmurs 


ABD: Abdomen soft but tender on RUQ. Bowel sounds present,


EXT: No clubbing cyanosis or edema


NEURO: Alert and oriented to person, follows commands





                             Vital Signs (last 8hr)








  Date Time  Temp Pulse Resp B/P (MAP) Pulse Ox O2 Delivery O2 Flow Rate FiO2


 


12/14/24 21:16    151/80    


 


12/14/24 20:00 98.1 75 18 151/80 95 Room Air  


 


12/14/24 20:00     96 Nasal Cannula* 3 32


 


12/14/24 18:43  62 18   N/Cannula Low lpm 2.0 28


 


12/14/24 17:01 98.2 68 18 135/50 98 Room Air  











LABS:





                                Hematology Labs:








Test


 12/14/24


04:31 Range/Units


 


 


White Blood Count 7.1  4.8-10.8  K/uL


 


Red Blood Count


 2.75 L


 4.00-5.50


MIL/uL


 


Hemoglobin 9.5 L 12.0-16.0  g/dL


 


Hematocrit 28.6 L 36-48  %


 


Mean Corpuscular Volume 104.0 H 79-99  fL


 


Mean Corpuscular Hemoglobin 34.5 H 27.0-33.0  pg


 


Mean Corpuscular Hemoglobin


Concent 33.2 


 32.0-36.0  g/dL





 


Red Cell Distribution Width 12.4  11.0-15.5  %


 


Platelet Count 176  130-400  K/uL


 


Mean Platelet Volume 10.5  7.5-10.5  fL


 


Immature Granulocyte % (Auto) 2.0 H 0-1  %


 


Neutrophils (%) (Auto) 73.1  40.0-77.0  %


 


Lymphocytes (%) (Auto) 14.5 L 21.0-51.0  %


 


Monocytes (%) (Auto) 8.9  3.0-13.0  %


 


Eosinophils (%) (Auto) 1.1  0.0-8.0  %


 


Basophils (%) (Auto) 0.4  0.0-5.0  %


 


Neutrophils # (Auto) 5.2  1.8-7.7  K/uL


 


Lymphocytes # (Auto) 1.0  1.0-4.8  K/uL


 


Monocytes # (Auto) 0.6  0.1-1.0  K/uL


 


Eosinophils # (Auto) 0.08  0.00-0.70  K/uL


 


Basophils # (Auto) 0.03  0.00-0.20  K/uL


 


Absolute Immature Granulocyte


(auto 0.14 


 0-1  K/uL





 


Nucleated Red Blood Cells 0.3 H 0.0-0.19  %








                                 Chemistry Labs:








Test


 12/14/24


19:19 12/14/24


04:31 12/13/24


03:00 Range/Units


 


 


Whole Blood Glucose 125 H     MG/DL


 


Sodium Level  146 H  136-145  mmol/L


 


Potassium Level  3.5   3.5-5.1  mmol/L


 


Chloride Level  101   101-111  mmol/L


 


Carbon Dioxide Level  31   21-32  mmol/L


 


Blood Urea Nitrogen  52 H  7-18  mg/dL


 


Creatinine  5.5 H  0.5-1.0  mg/dL


 


Glomerular Filtration Rate


Calc 


 8 


 


 >90  mL/min





 


Random Glucose  89     mg/dL


 


Total Calcium  8.2 L  8.5-10.1  mg/dL


 


Phosphorus Level  5.9 H  2.5-4.9  mg/dL


 


Total Bilirubin  0.6   0.2-1.0  mg/dL


 


Aspartate Amino Transf


(AST/SGOT) 


 22 


 


 10-37  U/L





 


Alanine Aminotransferase


(ALT/SGPT) 


 43 


 


 12-78  U/L





 


Alkaline Phosphatase  249 H    U/L


 


Total Protein  6.2   6.0-8.3  g/dL


 


Albumin  2.1 L  3.5-5.0  g/dL


 


Magnesium Level


 


 


 2.10 


 1.80-2.40


mg/dL








                                Coagulation Labs:








Test


 12/13/24


03:00 Range/Units


 


 


Prothrombin Time 11.9 H 9.6-11.6  SEC


 


Prothromb Time International


Ratio 1.11 


 0.85-1.15  














DIAGNOSTICS / RADIOLOGY RESULTS:


[ ]





PLAN 


advance diet as tolerated


ABX per ID


Follow nephrology HD recs 


follow drainage output


follow aspirate cultures


per general surgery pt can follow up in 1 week post discharge for drain 

management


Plan for DC in the next 24 -48 hrs


will reconsult case management for DC planning post drain placement and ABX, 

will benefit from SNF vs LTAC 





NEURO: 


Minimize central acting medications as possible. 


Maintain fall precautions, adequate lighting during the day





PULMONARY: 


Supplemental 02 as needed. 


Maintain aspiration precautions at all times


IS , nebs prn 





CARDIOVASCULAR: 


Follow hemodynamics. 


Vital signs per facility protocol


Several anti HTN medications at bedside, will reconcile into system . 


BP marginal only resume carvedilol and amlodine with parameters to hold


Hold Lasix due to septic state


Hold statin given abnormal Lfts 


Hydralazine Iv prn 





GI & NUTRITION:


Npo for procedure 


Consult General surgery and GI regarding Liver abscess. 


Ir for drainage 


Continue stool softeners and laxatives as needed.





KIDNEYS & ELECTROLYTES: 


HD per nephrology recs 





ENDOCRINE:


Maintain blood glucose between 100-180 at all times.


Hypoglycemia protocol in place





INFECTIOUS DISEASE: 


Trend temperature, WBC and procalcitonin level- zosyn 


Follow cultures, deescalate antibiotics as soon as possible.


Panculture if new onset fever





ONCOLOGY/HEMATOLOGY/COAGULATION: 


Monitor for s/s of bleeding


Monitor hemoglobin, coagulation studies as needed





SKIN:


Pressure ulcer prevention per facility protocol


Specialty mattress





ORTHO/REHAB:


Continue PT/OT 





Prophylaxis:


Continue GI and DVT prophylaxis





Code Status: 


Full Resuscitation





Disposition:


TBD





Other:


Total patient care time :  35 minutes











GIANNI BLOOD              Dec 14, 2024 23:03

## 2024-12-14 NOTE — PN
DIALYSIS NOTE



SUBJECTIVE:  The patient is seen and evaluated, on hemodialysis, prescription 

noted.



OBJECTIVE:

VITAL SIGNS:  Blood pressure is 123/59, pulse in the 60s.

CARDIOVASCULAR:  Regular.

LUNGS:  Coarse.



IMPRESSION:  End-stage renal disease.



PLAN:  The patient will continue with maximal ultrafiltration as blood pressure 

allows.  The patient remains on the IV antibiotics.  Repeat cultures have been 

negative.







DD: 12/14/2024 11:34 AM

DT: 12/14/2024 02:09 PM

TID: [id number] RECEIPT: [id number]

## 2024-12-15 VITALS — OXYGEN SATURATION: 92 %

## 2024-12-15 VITALS
SYSTOLIC BLOOD PRESSURE: 141 MMHG | OXYGEN SATURATION: 95 % | RESPIRATION RATE: 19 BRPM | DIASTOLIC BLOOD PRESSURE: 58 MMHG | HEART RATE: 74 BPM | TEMPERATURE: 98.1 F

## 2024-12-15 VITALS
DIASTOLIC BLOOD PRESSURE: 54 MMHG | SYSTOLIC BLOOD PRESSURE: 127 MMHG | HEART RATE: 70 BPM | RESPIRATION RATE: 17 BRPM | TEMPERATURE: 97.8 F

## 2024-12-15 VITALS
HEART RATE: 65 BPM | DIASTOLIC BLOOD PRESSURE: 58 MMHG | SYSTOLIC BLOOD PRESSURE: 133 MMHG | RESPIRATION RATE: 20 BRPM | TEMPERATURE: 97.9 F

## 2024-12-15 VITALS
DIASTOLIC BLOOD PRESSURE: 53 MMHG | TEMPERATURE: 97.8 F | RESPIRATION RATE: 18 BRPM | SYSTOLIC BLOOD PRESSURE: 138 MMHG | HEART RATE: 67 BPM

## 2024-12-15 VITALS
HEART RATE: 67 BPM | DIASTOLIC BLOOD PRESSURE: 63 MMHG | SYSTOLIC BLOOD PRESSURE: 132 MMHG | TEMPERATURE: 98.1 F | RESPIRATION RATE: 18 BRPM

## 2024-12-15 VITALS
SYSTOLIC BLOOD PRESSURE: 132 MMHG | HEART RATE: 61 BPM | DIASTOLIC BLOOD PRESSURE: 51 MMHG | TEMPERATURE: 98.1 F | RESPIRATION RATE: 20 BRPM

## 2024-12-15 LAB
ALBUMIN SERPL-MCNC: 2.2 G/DL (ref 3.5–5)
ALT SERPL-CCNC: 33 U/L (ref 12–78)
APTT PPP: 29.1 SEC (ref 26.3–35.5)
AST SERPL-CCNC: 16 U/L (ref 10–37)
BASOPHILS # BLD AUTO: 0.04 K/UL (ref 0–0.2)
BASOPHILS NFR BLD AUTO: 0.5 % (ref 0–5)
BILIRUB SERPL-MCNC: 0.5 MG/DL (ref 0.2–1)
BUN SERPL-MCNC: 37 MG/DL (ref 7–18)
CHLORIDE SERPL-SCNC: 98 MMOL/L (ref 101–111)
CO2 SERPL-SCNC: 32 MMOL/L (ref 21–32)
CREAT SERPL-MCNC: 4.5 MG/DL (ref 0.5–1)
EOSINOPHIL # BLD AUTO: 0.12 K/UL (ref 0–0.7)
EOSINOPHIL NFR BLD AUTO: 1.4 % (ref 0–8)
ERYTHROCYTE [DISTWIDTH] IN BLOOD BY AUTOMATED COUNT: 12.2 % (ref 11–15.5)
GFR SERPL CREATININE-BSD FRML MDRD: 10 ML/MIN (ref 90–?)
GLUCOSE SERPL-MCNC: 100 MG/DL (ref 70–105)
HCT VFR BLD AUTO: 31.9 % (ref 36–48)
IMM GRANULOCYTES # BLD: 0.29 K/UL (ref 0–1)
INR PPP: 0.98 (ref 0.85–1.15)
LYMPHOCYTES # SPEC AUTO: 1.5 K/UL (ref 1–4.8)
LYMPHOCYTES NFR SPEC AUTO: 17.7 % (ref 21–51)
MCH RBC QN AUTO: 34.1 PG (ref 27–33)
MCHC RBC AUTO-ENTMCNC: 32.9 G/DL (ref 32–36)
MCV RBC AUTO: 103.6 FL (ref 79–99)
MONOCYTES # BLD AUTO: 0.7 K/UL (ref 0.1–1)
MONOCYTES NFR BLD AUTO: 8.4 % (ref 3–13)
NEUTROPHILS # BLD AUTO: 5.9 K/UL (ref 1.8–7.7)
NEUTROPHILS NFR BLD AUTO: 68.6 % (ref 40–77)
NRBC BLD MANUAL-RTO: 0 % (ref 0–0.19)
PLATELET # BLD AUTO: 210 K/UL (ref 130–400)
POTASSIUM SERPL-SCNC: 3.2 MMOL/L (ref 3.5–5.1)
PROT SERPL-MCNC: 6.7 G/DL (ref 6–8.3)
PROTHROMBIN TIME: 11 SEC (ref 9.6–11.6)
RBC # BLD AUTO: 3.08 MIL/UL (ref 4–5.5)
SODIUM SERPL-SCNC: 138 MMOL/L (ref 136–145)
WBC # BLD AUTO: 8.6 K/UL (ref 4.8–10.8)

## 2024-12-15 RX ADMIN — WATER PRN GM: 1 INJECTION INTRAVENOUS at 12:05

## 2024-12-15 NOTE — NUR
CM NOTE

MET  with pt and discussed snf orders , pt in agreement, but requested i call her sister law 
Christofer Raymond [phone]. so call made to  christofer raymond and states is in agreement for snf referral 
and prefers [facility], she has been there before. choice letter obtained. and 
referral faxed to [facility] pending approval.

## 2024-12-15 NOTE — PN
SUBJECTIVE:  The patient is a 74-year-old female with a history of diabetes 

mellitus and hypertension.  The patient is with a history of end-stage renal 

disease, on dialysis three times per week.  The patient is status post recent 

cholecystectomy.  The patient presented to the hospital with bacteremia.  The 

patient remains on the antibiotics.  Repeat cultures thus far have been 

negative.  The patient is with a recent percutaneous drain placement for the 

abscess.  She is being seen as a followup visit for all the above.  She did 

receive dialysis yesterday without difficulty.



REVIEW OF SYSTEMS:

GENERAL:  She is feeling weak and tired.

HEENT:  No change in vision.  No change in hearing.

CARDIOVASCULAR:  There are no current chest pains or palpitations.

PULMONARY:  There is no shortness of breath.

GASTROINTESTINAL:  The patient is tolerating diet.

MUSCULOSKELETAL:  Complains of weakness.



PHYSICAL EXAMINATION:

VITAL SIGNS:  Blood pressure 132/51, pulse 60s.

GENERAL:  Chronically ill female, lying in bed on medical floor.

HEENT:  Head is atraumatic.  Pupils equal, roving to light.  Oropharynx is 

without exudate.  Nares clear.

NECK:  There is no JVP.  There is no thyromegaly, no mass.

CARDIOVASCULAR:  Regular.  There is no S3, S4, or gallop.

LUNGS:  Coarse with equal thoracic movement.

ABDOMEN:  Soft, nondistended, and nontender.

EXTREMITIES:  Reveal no clubbing, no cyanosis.



LABORATORY DATA:  Hemoglobin 10, hematocrit 31.  Sodium 138, potassium 3.2, BUN 

37, creatinine is 4.5.



IMPRESSION:

1.  Bacteremia.

2.  History of abdominal abscess, status post drainage.

3.  Diabetes mellitus.

4.  Hypertension.

5.  End-stage renal disease.



PLAN:  The patient continues with the IV antibiotics.  The patient's repeat 

cultures thus far have been negative.  The patient has the drain in place.  We 

will continue to follow closely.  She remains dialysis on a Tuesday, Thursday, 

and Saturday schedule.







DD: 12/15/2024 11:19 AM

DT: 12/15/2024 12:25 PM

TID: [id number] RECEIPT: [id number]

## 2024-12-15 NOTE — NUR
Patient seen and evaluated. No chair pad alarm available on the floor. Nurse aware. Patient 
sat in chair with family present.

## 2024-12-15 NOTE — PN
INFECTIOUS DISEASE  PROGRESS NOTE








Date of Service: Dec 15, 2024





SUBJECTIVE:


This is a 74-year-old female patient with past medical history of diabetes 

mellitus and end-stage renal disease, on dialysis who presented to the hospital 

with chief complaint of generalized body weakness, and right upper abdominal 

quadrant pain.  An MRCP showed fluid collection in the anterior segment of the 

right lobe of the liver consistent with abscess and the reason for this consult.


Patient was seen and examined at bedside in room 330.  


Patient is status post percutaneous drain placement on 12/12/2024.  Only about 

10 mL of drainage observe in the bag at this time.  The aspirate cultures from 

the aspirate came back positive for Enterococcus Raffinosus, E coli and 

Klebsiella oxytoca.  Patient reported that the abdominal pain is much less after

the insertion of the drain.  Patient remains afebrile, temperature is 98.1 and 

a WBC of 8.6. We will continue on vancomycin per pharmacy protocol and Zosyn IV.

 Patient was dialyzed yesterday and 2.8 L were removed.  We will continue to 

monitor patient's care.





PHYSICAL EXAM


EYES:  Anicteric.  Pupils equal and reactive.


HENT: No oral thrush seen, moist Oral mucosa.


NECK:  Supple, no JVD or thyromegaly.


LUNGS:  Good air entry.  No rales, no rhonchi.


CARDIOVASCULAR:  S1, S2 regular.  No murmur heard.


ABDOMEN:  Soft, non tender, bowel sounds present, no organomegaly.  Right 

percutaneous drainage catheter.


CENTRAL NERVOUS SYSTEM:  Awake, alert, oriented x 3.  


SKIN:  No rashes, no swelling.


LYMPHATICS: No peripheral lymphadenopathy.


MUSCULOSKELETAL:  No joint swelling, erythema or tenderness.


EXTREMITIES:  No cyanosis or clubbing.


BACK:  No deformity, no pressure ulcer.


GENITOURINARY:  No dysuria or hematuria.








Vital Sign (Last 12 Hours)








 12/15/24 12/15/24 12/15/24 12/15/24





 04:00 08:08 09:16 11:55


 


Temp 97.9 98.1  97.9


 


Pulse 70 61  67


 


Resp 17 20  18


 


B/P (MAP) 127/54 132/51 132/51 138/53


 


Pulse Ox 97 92  93


 


O2 Delivery Nasal Cannula Room Air  Room Air


 


O2 Flow Rate 4.0   














Intake & Output (last 24hrs)   


 


 12/14/24 12/14/24 12/15/24





 15:00 23:00 07:00


 


Intake Total 0 ml 150 ml 


 


Output Total 2800 ml  15 ml


 


Balance -2800 ml 150 ml -15 ml











LABS:








                                   Laboratory:








Test


 12/15/24


11:38 12/15/24


04:40 12/14/24


04:31 Range/Units


 


 


Whole Blood Glucose 152 #H     MG/DL


 


White Blood Count  8.6   4.8-10.8  K/uL


 


Red Blood Count


 


 3.08 L


 


 4.00-5.50


MIL/uL


 


Hemoglobin  10.5 L  12.0-16.0  g/dL


 


Hematocrit  31.9 L  36-48  %


 


Mean Corpuscular Volume  103.6 H  79-99  fL


 


Mean Corpuscular Hemoglobin  34.1 H  27.0-33.0  pg


 


Mean Corpuscular Hemoglobin


Concent 


 32.9 


 


 32.0-36.0  g/dL





 


Red Cell Distribution Width  12.2   11.0-15.5  %


 


Platelet Count  210   130-400  K/uL


 


Mean Platelet Volume  9.9   7.5-10.5  fL


 


Immature Granulocyte % (Auto)  3.4 H  0-1  %


 


Neutrophils (%) (Auto)  68.6   40.0-77.0  %


 


Lymphocytes (%) (Auto)  17.7 L  21.0-51.0  %


 


Monocytes (%) (Auto)  8.4   3.0-13.0  %


 


Eosinophils (%) (Auto)  1.4   0.0-8.0  %


 


Basophils (%) (Auto)  0.5   0.0-5.0  %


 


Neutrophils # (Auto)  5.9   1.8-7.7  K/uL


 


Lymphocytes # (Auto)  1.5   1.0-4.8  K/uL


 


Monocytes # (Auto)  0.7   0.1-1.0  K/uL


 


Eosinophils # (Auto)  0.12   0.00-0.70  K/uL


 


Basophils # (Auto)  0.04   0.00-0.20  K/uL


 


Absolute Immature Granulocyte


(auto 


 0.29 


 


 0-1  K/uL





 


Nucleated Red Blood Cells  0.0   0.0-0.19  %


 


Sodium Level  138   136-145  mmol/L


 


Potassium Level  3.2 L  3.5-5.1  mmol/L


 


Chloride Level  98 L  101-111  mmol/L


 


Carbon Dioxide Level  32   21-32  mmol/L


 


Blood Urea Nitrogen  37 H  7-18  mg/dL


 


Creatinine  4.5 H  0.5-1.0  mg/dL


 


Glomerular Filtration Rate


Calc 


 10 


 


 >90  mL/min





 


Random Glucose  100     mg/dL


 


Total Calcium  8.3 L  8.5-10.1  mg/dL


 


Total Bilirubin  0.5   0.2-1.0  mg/dL


 


Aspartate Amino Transf


(AST/SGOT) 


 16 


 


 10-37  U/L





 


Alanine Aminotransferase


(ALT/SGPT) 


 33 


 


 12-78  U/L





 


Alkaline Phosphatase  227 H    U/L


 


Total Protein  6.7   6.0-8.3  g/dL


 


Albumin  2.2 L  3.5-5.0  g/dL


 


Phosphorus Level   5.9 H 2.5-4.9  mg/dL














ASSESSMENT:


Liver abscess status post percutaneous drain placement on 12/12/2024.  


Leukocytosis, resolving.


End-stage renal disease, on dialysis.  


Diabetes mellitus.  


Anemia.  





PLAN:


Continue vancomycin per pharmacy protocol.  


Continue Zosyn IV.


Continue pain management.


We will follow up on the final aspirate culture results.


Monitoring drain output.


Monitor electrolytes.


Continue dialysis as recommended by nephrologist.





This case was reviewed and discussed with my supervising physician and the above

assessment and plan was formulated and agreed upon.


ATTESTATION BY PHYSICIAN


I have seen and examined the patient. I reviewed the documentation, medical 

decision making, and treatment plan as noted by the mid-level provider above. I 

agree with the findings and plan of care.











DULCE MARIA GARZA MD, [facility]             Dec 15, 2024 13:47

## 2024-12-15 NOTE — PN
[facility] PROGRESS NOTE 





Date Patient Seen: Dec 15, 2024 


Time of Visit: 10:23


Supervising Physician: Dr. Barron





Primary Care Physician: [Dr. Delgado Isaacs ]


Outpatient Specialists: [ Dr. Swan, nephro ]]


Inpatient Consults:  General surgery, Infectious Disease, GI, Nephrology





PROBLEM LIST:


Sepsis secondary to 3x3.4 Hepatic Abscess 


   S/P IR drain placement within rt lobe hepatic abscess, culture positive for 

polymicrobial infection   


ESRD HD TTS 


Chronic Congestive Diastolic Heart Failure , Echo 2023 EF of 65% 


Primary hypertension 


Hyperlipidemia


Diabetes mellitus II Hg A1c of 7.2 % from 2019


LA grade B reflux esophagitis w/ no bleeding on EGD 12/14/24


non bleeding gastric ulcers on EGD 12/14/24


2 mm polyp in cecum s/p resection on colonoscopy 12/14/24


3-5mm polyp in descending colon and transverse colon on colonoscopy 12/14/24


none bleeding internal hemorrhoids on colonoscopy 12/14/24





INTERVAL HISTORY:


Pt remains weak at this point. Drain in place to RUQ.  Continues on IV abx:  

Vancomycin and Zosyn.  


Will need SNF placement for abx/rehab/drain management


Denies any headache or dizziness. 


Denies any chest pain or palpitations.


Denies any cough or shortness of breath at rest.


Denies any nausea, vomiting, abdominal pain, or melena


Tolerating oral diet. Appetite is fair


Denies any dysuria or hematuria





REVIEW OF SYSTEMS: 


12 point ROS reviewed with patient. Pertinent positives mentioned above. 

Otherwise negative.





PHYSICAL EXAM: 


GENERAL: alert, weak, awake oriented x 3


HEENT: EOMI, Sclera non icteric, moist mucosa 


NECK: Supple, no JVD, trachea midline 


LUNGS: Clear breath sounds bilaterally. No wheezes


HEART: Regular rate and rhythm. Normal S1 and S2, without murmurs 


ABD: Abdomen soft but tender on RUQ. Bowel sounds present,


EXT: No clubbing cyanosis or edema


NEURO: Alert and oriented to person, follows commands





                             Vital Signs (last 8hr)








  Date Time  Temp Pulse Resp B/P (MAP) Pulse Ox O2 Delivery O2 Flow Rate FiO2


 


12/15/24 09:16    132/51    


 


12/15/24 08:08 98.1 61 20 132/51 92 Room Air  


 


12/15/24 04:00 97.9 70 17 127/54 97 Nasal Cannula 4.0 











LABS:





                                Hematology Labs:








Test


 12/15/24


04:40 Range/Units


 


 


White Blood Count 8.6  4.8-10.8  K/uL


 


Red Blood Count


 3.08 L


 4.00-5.50


MIL/uL


 


Hemoglobin 10.5 L 12.0-16.0  g/dL


 


Hematocrit 31.9 L 36-48  %


 


Mean Corpuscular Volume 103.6 H 79-99  fL


 


Mean Corpuscular Hemoglobin 34.1 H 27.0-33.0  pg


 


Mean Corpuscular Hemoglobin


Concent 32.9 


 32.0-36.0  g/dL





 


Red Cell Distribution Width 12.2  11.0-15.5  %


 


Platelet Count 210  130-400  K/uL


 


Mean Platelet Volume 9.9  7.5-10.5  fL


 


Immature Granulocyte % (Auto) 3.4 H 0-1  %


 


Neutrophils (%) (Auto) 68.6  40.0-77.0  %


 


Lymphocytes (%) (Auto) 17.7 L 21.0-51.0  %


 


Monocytes (%) (Auto) 8.4  3.0-13.0  %


 


Eosinophils (%) (Auto) 1.4  0.0-8.0  %


 


Basophils (%) (Auto) 0.5  0.0-5.0  %


 


Neutrophils # (Auto) 5.9  1.8-7.7  K/uL


 


Lymphocytes # (Auto) 1.5  1.0-4.8  K/uL


 


Monocytes # (Auto) 0.7  0.1-1.0  K/uL


 


Eosinophils # (Auto) 0.12  0.00-0.70  K/uL


 


Basophils # (Auto) 0.04  0.00-0.20  K/uL


 


Absolute Immature Granulocyte


(auto 0.29 


 0-1  K/uL





 


Nucleated Red Blood Cells 0.0  0.0-0.19  %








                                 Chemistry Labs:








Test


 12/15/24


04:49 12/15/24


04:40 12/14/24


04:31 Range/Units


 


 


Whole Blood Glucose 101      MG/DL


 


Sodium Level  138   136-145  mmol/L


 


Potassium Level  3.2 L  3.5-5.1  mmol/L


 


Chloride Level  98 L  101-111  mmol/L


 


Carbon Dioxide Level  32   21-32  mmol/L


 


Blood Urea Nitrogen  37 H  7-18  mg/dL


 


Creatinine  4.5 H  0.5-1.0  mg/dL


 


Glomerular Filtration Rate


Calc 


 10 


 


 >90  mL/min





 


Random Glucose  100     mg/dL


 


Total Calcium  8.3 L  8.5-10.1  mg/dL


 


Total Bilirubin  0.5   0.2-1.0  mg/dL


 


Aspartate Amino Transf


(AST/SGOT) 


 16 


 


 10-37  U/L





 


Alanine Aminotransferase


(ALT/SGPT) 


 33 


 


 12-78  U/L





 


Alkaline Phosphatase  227 H    U/L


 


Total Protein  6.7   6.0-8.3  g/dL


 


Albumin  2.2 L  3.5-5.0  g/dL


 


Phosphorus Level   5.9 H 2.5-4.9  mg/dL











DIAGNOSTICS / RADIOLOGY RESULTS:


Reviewed with supervising MD





Plan:


Neuro: 


Minimize central acting medications as possible. 


Maintain fall precautions, adequate lighting during the day





Cardiovascular: 


Follow hemodynamics. 


Vital signs per facility protocol





Pulmonary: 


Supplemental 02 as needed. 


Maintain aspiration precautions at all times





GI and nutrition: 


Continue with nutritional support.


Continue stool softeners and laxatives as needed.





Kidney and electrolytes:


Strict monitoring of intake, output and overall fluid balance. 


Avoid nephrotoxic medications to the extent possible. 


Medications to be dosed according to renal function.


Monitor electrolytes and replace as needed





Endocrine:


Maintain blood glucose between 100-180 at all times.


Hypoglycemia protocol in place





Infectious disease: 


Trend temperature, WBC and procalcitonin level


Follow cultures, deescalate antibiotics as soon as possible.


Panculture if new onset fever





Oncology/Hematology/Coagulation: 


Monitor for s/s of bleeding


Monitor hemoglobin, coagulation studies as needed





Skin:


Pressure ulcer prevention per facility protocol


Specialty mattress





Ortho/Rehab:


Continue PT/OT 





Prophylaxis:.


Continue GI and DVT prophylaxis as appropriate





Disposition:. 


SNF





Total patient care time excluding any procedures:  40 min











TASHA SKELTON NP        Dec 15, 2024 10:24

## 2024-12-15 NOTE — NUR
PLACED 20G ON RIGHT WRIST. PIV PATENT, FLUSHED WITH 10CC OF NORMAL SALINE. NOTIFY MRS. NANCY NESS ASSIGNED TO PATIENT.

## 2024-12-16 VITALS
SYSTOLIC BLOOD PRESSURE: 141 MMHG | RESPIRATION RATE: 18 BRPM | DIASTOLIC BLOOD PRESSURE: 67 MMHG | TEMPERATURE: 98.1 F | HEART RATE: 65 BPM

## 2024-12-16 VITALS
SYSTOLIC BLOOD PRESSURE: 122 MMHG | TEMPERATURE: 98 F | RESPIRATION RATE: 17 BRPM | HEART RATE: 64 BPM | DIASTOLIC BLOOD PRESSURE: 53 MMHG

## 2024-12-16 VITALS
TEMPERATURE: 98.3 F | DIASTOLIC BLOOD PRESSURE: 57 MMHG | HEART RATE: 60 BPM | SYSTOLIC BLOOD PRESSURE: 127 MMHG | RESPIRATION RATE: 17 BRPM

## 2024-12-16 VITALS — OXYGEN SATURATION: 95 %

## 2024-12-16 VITALS — OXYGEN SATURATION: 98 % | RESPIRATION RATE: 20 BRPM | HEART RATE: 65 BPM

## 2024-12-16 VITALS
HEART RATE: 63 BPM | DIASTOLIC BLOOD PRESSURE: 59 MMHG | RESPIRATION RATE: 17 BRPM | SYSTOLIC BLOOD PRESSURE: 147 MMHG | TEMPERATURE: 98.2 F

## 2024-12-16 VITALS
HEART RATE: 67 BPM | RESPIRATION RATE: 18 BRPM | TEMPERATURE: 98.7 F | SYSTOLIC BLOOD PRESSURE: 126 MMHG | DIASTOLIC BLOOD PRESSURE: 50 MMHG

## 2024-12-16 VITALS
TEMPERATURE: 98 F | DIASTOLIC BLOOD PRESSURE: 53 MMHG | HEART RATE: 61 BPM | SYSTOLIC BLOOD PRESSURE: 139 MMHG | RESPIRATION RATE: 17 BRPM

## 2024-12-16 VITALS — RESPIRATION RATE: 20 BRPM | OXYGEN SATURATION: 97 % | HEART RATE: 65 BPM

## 2024-12-16 NOTE — PN
GASTROENTEROLOGY PROGRESS NOTE








Date of Visit: Dec 16, 2024 


Time of Visit: 13:27





Events / Notes:


No acute events overnight.  Patient underwent IR drainage of liver abscess. EGD 

revealing LA grade B reflux esophagitis with nonbleeding gastric ulcers and 

colonoscopy revealing colon polyp status post polypectomy.  Nonbleeding internal

hemorrhoids.





Review of Systems:


CONSTITUTIONAL: No malaise or change in sensation of wellbeing.


ENMT: No rhinorrhea, otorrhea, sinus pain, ear ache.


CARDIOVASCULAR: No angina, palpitations, orthopnea or paroxysmal dyspnea.


RESPIRATORY: No SOB.


GASTROINTESTINAL: No abdominal pain, nausea, vomiting, diarrhea, hematemesis, 

melena or change in the patient's habitual bowel movements consistency/number.


GENITOURINARY: No dysuria, hematuria or change in bladder continence.


MUSCULOSKELETAL: No new muscle pain or decrease in muscular strength. No new 

joint swelling, redness or tenderness.


SKIN: No new rash.





Physical Exam:


GEN: Awake, alert, oriented in person, time and place, and in no acute distress.


HEENT: No sinus tenderness. Tympanic membranes were not examined. No rhinorrhea.

Oral pharyngeal mucosa is pink, moist and within normal limits. Neck is supple 

with no cervical lymphadenopathy, thyromegaly or JVD.


CHEST: Inspection, palpation and percussion of the chest were unremarkable. Lung

auscultation revealed normal breath sounds bilaterally.


CARDIAC: PMI is within normal limits. Heart sounds are regular. Normal S1, S2. 

No gallop or murmur.


ABD: Soft, non-tender and not distended. No peritoneal signs on palpation. No 

organomegaly. Normal bowel sounds.


EXT: No cyanosis or clubbing. No edema.


SKIN: Intact. No rashes.


JOINTS: No evidence of synovitis or acute arthritis.


NEURO: Alert and oriented to name, place and person. Cranial nerve examination 

is unremarkable. No focal motor deficits. Normal speech. Gait is normal. 

Strength is normal.





                             Vital Signs (last 8hr)








  Date Time  Temp Pulse Resp B/P (MAP) Pulse Ox O2 Delivery O2 Flow Rate FiO2


 


12/16/24 11:22 98.1 61 17 139/53 96 Nasal Cannula 3.5 


 


12/16/24 08:26    147/59    


 


12/16/24 07:51 98.2 63 17 147/59 95 Nasal Cannula 3.5 


 


12/16/24 07:16  65 20   N/Cannula Low lpm 3.5 











Laboratory:


[ ]








                                   Laboratory:








Test


 12/16/24


11:06 12/15/24


14:45 12/15/24


04:40 Range/Units


 


 


Whole Blood Glucose 138 H     MG/DL


 


Prothrombin Time  11.0   9.6-11.6  SEC


 


Prothromb Time International


Ratio 


 0.98 


 


 0.85-1.15  





 


Activated Partial


Thromboplast Time 


 29.1 


 


 26.3-35.5  SEC





 


White Blood Count   8.6  4.8-10.8  K/uL


 


Red Blood Count


 


 


 3.08 L


 4.00-5.50


MIL/uL


 


Hemoglobin   10.5 L 12.0-16.0  g/dL


 


Hematocrit   31.9 L 36-48  %


 


Mean Corpuscular Volume   103.6 H 79-99  fL


 


Mean Corpuscular Hemoglobin   34.1 H 27.0-33.0  pg


 


Mean Corpuscular Hemoglobin


Concent 


 


 32.9 


 32.0-36.0  g/dL





 


Red Cell Distribution Width   12.2  11.0-15.5  %


 


Platelet Count   210  130-400  K/uL


 


Mean Platelet Volume   9.9  7.5-10.5  fL


 


Immature Granulocyte % (Auto)   3.4 H 0-1  %


 


Neutrophils (%) (Auto)   68.6  40.0-77.0  %


 


Lymphocytes (%) (Auto)   17.7 L 21.0-51.0  %


 


Monocytes (%) (Auto)   8.4  3.0-13.0  %


 


Eosinophils (%) (Auto)   1.4  0.0-8.0  %


 


Basophils (%) (Auto)   0.5  0.0-5.0  %


 


Neutrophils # (Auto)   5.9  1.8-7.7  K/uL


 


Lymphocytes # (Auto)   1.5  1.0-4.8  K/uL


 


Monocytes # (Auto)   0.7  0.1-1.0  K/uL


 


Eosinophils # (Auto)   0.12  0.00-0.70  K/uL


 


Basophils # (Auto)   0.04  0.00-0.20  K/uL


 


Absolute Immature Granulocyte


(auto 


 


 0.29 


 0-1  K/uL





 


Nucleated Red Blood Cells   0.0  0.0-0.19  %


 


Sodium Level   138  136-145  mmol/L


 


Potassium Level   3.2 L 3.5-5.1  mmol/L


 


Chloride Level   98 L 101-111  mmol/L


 


Carbon Dioxide Level   32  21-32  mmol/L


 


Blood Urea Nitrogen   37 H 7-18  mg/dL


 


Creatinine   4.5 H 0.5-1.0  mg/dL


 


Glomerular Filtration Rate


Calc 


 


 10 


 >90  mL/min





 


Random Glucose   100    mg/dL


 


Total Calcium   8.3 L 8.5-10.1  mg/dL


 


Total Bilirubin   0.5  0.2-1.0  mg/dL


 


Aspartate Amino Transf


(AST/SGOT) 


 


 16 


 10-37  U/L





 


Alanine Aminotransferase


(ALT/SGPT) 


 


 33 


 12-78  U/L





 


Alkaline Phosphatase   227 H   U/L


 


Total Protein   6.7  6.0-8.3  g/dL


 


Albumin   2.2 L 3.5-5.0  g/dL











                               Current Medications








 Medications


  (Trade)  Dose


 Ordered  Sig/Adenike


 Route


 PRN Reason  Start Time


 Stop Time Status Last Admin


Dose Admin


 


 Acetaminophen


  (TYLenol 325MG


 TAB)  650 mg  Q6H  PRN


 PO


 FEVER/MILD PAIN LEVEL 1-3  12/11/24 00:30


 1/10/25 00:29  12/12/24 09:12


650 MG


 


 Albuterol Sulfate


  (Proventil


 0.083% 2.5mg/3ml)  2.5 mg  [id number]  PRN


 IH


 SHORTNESS OF BREATH  12/11/24 00:30


 1/10/25 00:29   





 


 Amlodipine


 Besylate


  (NorvASC 5MG TAB)  10 mg  DAILY


 PO


   12/13/24 09:00


 1/12/25 08:59  12/16/24 08:26


10 MG


 


 Aspirin


  (Aspirin 81mg


 Chew Tab)  81 mg  DAILY


 PO


   12/13/24 09:00


 1/12/25 08:59  12/16/24 08:25


81 MG


 


 Carvedilol


  (Coreg 3.125MG)  3.125 mg  BID


 PO


   12/12/24 21:00


 1/11/25 20:59  12/16/24 08:26


3.125 MG


 


 Cinacalcet


  (Sensipar 30mg


 Tab)  30 mg  DAILY


 PO


   12/13/24 09:00


 1/12/25 08:59  12/16/24 08:25


30 MG


 


 Doxazosin Mesylate


  (Doxazosin


 Mesylate)  1 mg  HS


 PO


   12/12/24 21:00


 1/11/25 20:59  12/15/24 21:01


1 MG


 


 Gabapentin


  (NEURontin 100


 mg CAP)  100 mg  DAILY


 PO


   12/13/24 09:00


 1/12/25 08:59  12/16/24 08:26


100 MG


 


 Hydralazine HCl


  (APRESOLine 20MG


 INJ)  10 mg  Q6H  PRN


 IV


 SBP GREATER THAN 180  12/11/24 00:30


 1/10/25 00:29   





 


 Hydromorphone HCl


  (DiLAUDid 0.5MG


 INJ)  0.2 mg  Q4H  PRN


 IVP


 SEVERE PAIN (7-10)  12/11/24 00:30


 12/16/24 00:29 DC 12/15/24 05:01


0.2 MG


 


 Labetalol HCl


  (TRANdate 20MG


 SYG)  10 mg  Q2H  PRN


 IV


 SBP GREATER THAN 180  12/11/24 00:30


 1/10/25 00:29   





 


 Lactulose


  (Constulose 20gm/


 30ml Udcup)  20 gm  Q6H  PRN


 PO


 CONSTIPATION  12/11/24 00:30


 1/10/25 00:29  12/15/24 12:05


20 GM


 


 Levothyroxine


 Sodium


  (SYNTHroid 88MCG


 TAB)  88 mcg  SYN


 PO


   12/13/24 06:30


 1/12/25 06:29  12/16/24 05:39


88 MCG


 


 Ondansetron HCl


  (zoFRAN 4MG INJ)  4 mg  Q6H  PRN


 IVP


 NAUSEA/VOMITING  12/11/24 00:30


 1/10/25 00:29  12/14/24 13:58


4 MG


 


 Pharmacy Profile


 Note


  (Pharmacy


 Communication)  1 each  ONCE


 MISC


   12/11/24 00:30


 12/11/24 00:26 DC  





 


 Piperacillin Sod/


 Tazobactam Sod


  (Zosyn


 3.375gm+NS 50ml)  3.375 gm  Q12H


 IV


   12/11/24 00:30


 12/21/24 00:29  12/16/24 12:34


3.375 GM


 


 Polyethylene


 Glycol/


 Electrolytes


  (Golytely/Colyte


 Soln)  4,000 ml  ONCE


 PO


   12/13/24 17:00


 12/13/24 23:59 DC 12/13/24 17:59


4,000 ML


 


 Sodium Chloride  1,000 ml @ 


 0 mls/hr  ONCE


 IV


   12/14/24 10:00


 1/13/25 09:59  12/14/24 12:17


333 MLS/HR


 


 Vancomycin HCl


  (Vancomycin


 750mg)  750 mg  TTHSPHD


 IVPB


   12/12/24 16:00


 12/16/24 12:25 DC 12/14/24 17:42


750 MG


 


 Vancomycin HCl


  (Vancomycin


 Protocol)  1 each  AD


 IV


   12/11/24 00:30


 12/11/24 00:25 DC  





 


 Vancomycin HCl


  (Vancomycin


 Protocol)  1 each  AD


 IV


   12/11/24 00:30


 12/16/24 12:25 DC  





 


 Vitamin B Complex/


 Vit C/Folic Acid


  (Nephrovite


 Tablet)  1 cap  DAILY


 PO


   12/12/24 09:00


 1/11/25 08:59  12/16/24 08:27


1 CAP


 


 Vitamin B Complex/


 Vit C/Folic Acid


  (Nephrovite


 Tablet)  1 cap  DAILY


 PO


   12/13/24 09:00


 1/12/25 08:59   














Diagnostics / Radiology:


[COPY/PASTE HERE IF NO REPORTS PLEASE DELETE SECTION]





Assessment:  


GERD


Gastric ulcers


Colon polyps


Hemorrhoids


Liver abscess


Abdominal pain





Plan: 


Continue GI prophylaxis


Advance diet as tolerated


Avoid NSAIDs


Antireflux measures


Monitor H&H and transfuse as needed


Call with questions, concerns or change in clinical status


Patient to follow-up at clinic post discharge


Thank you for this consult











ENRIQUETA GARCIA FNP            Dec 16, 2024 13:27

## 2024-12-16 NOTE — PN
NEPHROLOGY PROGRESS NOTE








Date/Time Patient Seen: Dec 16, 2024





SUBJECTIVE:


The patient is a 74-year-old female with a history of diabetes, end stage renal 

disease on hemodialysis Tuesday Thursday Saturday, recent cholecystectomy 


She presents to the emergency department with complaints of generalized body 

weakness, bilateral flank pain, nausea, right upper abdominal pain onset two 

days ago.  


Blood cultures from outpatient dialysis clinic positive for gram negative 

bacilli 


Blood cultures collected on 12/10/2024 are negative


Body fluid cultures are positive for Enterococcus raffinosus, E coli, Klebsiella

oxytoca


She continues on antibiotics as per ID


S/P percutaneous drain placed for hepatic abscess on 12/12


Case management coordinating SNF placement


She tolerated this without difficulty.


Dialysis planned for tomorrow


She was seen in the medical floor, no acute distress





REVIEW OF SYSTEMS:


GENERAL: Negative for any nausea, vomiting, fevers, chills, or weight loss.


NEUROLOGIC: Negative for any blurry vision, blind spots, double vision, facial 

asymmetry, dysphagia, dysarthria, hemiparesis, hemisensory deficits, vertigo, 

ataxia.


HEENT: Negative for any head trauma, neck trauma, neck stiffness, photophobia, 

phonophobia, sinusitis, rhinitis.


CARDIAC: Negative for any chest pain, dyspnea on exertion, paroxysmal nocturnal 

dyspnea, peripheral edema.


PULMONARY: Negative for any shortness of breath, wheezing, COPD, or TB exposure.


GASTROINTESTINAL: Negative for any abdominal pain, nausea, vomiting, bright red 

blood per rectum, melena.


GENITOURINARY: Negative for any dysuria, hematuria, incontinence.


INTEGUMENTARY: Negative for any rashes, cuts, insect bites.


RHEUMATOLOGIC: Negative for any joint pains, photosensitive rashes, history of 

vasculitis or kidney problems.


HEMATOLOGIC: Negative for any abnormal bruising, frequent infections or 

bleeding.





                             Vital Signs (last 8hr)








  Date Time  Temp Pulse Resp B/P (MAP) Pulse Ox O2 Delivery O2 Flow Rate FiO2


 


12/13/24 12:00 98.8 63 19 153/55 94 Nasal Cannula 3.0 


 


12/13/24 08:00 98.8 77 20 159/47 91 Room Air  








PHYSICAL EXAM:


GENERAL: Alert and oriented x 3. No acute distress. Well-nourished.


EYES: EOMI. Anicteric.


HENT: Moist mucous membranes. No scleral icterus. No cervical lymphadenopathy.


LUNGS: Clear to auscultation bilaterally. No accessory muscle use.


CARDIOVASCULAR: Regular rate and rhythm. No murmur. No JVD.


ABDOMEN: Soft, non-tender and non-distended. No palpable masses.


EXTREMITIES: No edema. Non-tender.?SKIN: No rashes or lesions. Warm.


NEUROLOGIC: No focal neurological deficits. CN II-XII grossly intact, but not 

individually tested.


PSYCHIATRIC: Cooperative. Appropriate mood and affect.








                               Current Medications








 Medications


  (Trade)  Dose


 Ordered  Sig/Adenike


 Route


 PRN Reason  Start Time


 Stop Time Status Last Admin


Dose Admin


 


 Acetaminophen


  (TYLenol 325MG


 TAB)  650 mg  Q6H  PRN


 PO


 FEVER/MILD PAIN LEVEL 1-3  12/11/24 00:30


 1/10/25 00:29  12/12/24 09:12


650 MG


 


 Albuterol Sulfate


  (Proventil


 0.083% 2.5mg/3ml)  2.5 mg  [id number]  PRN


 IH


 SHORTNESS OF BREATH  12/11/24 00:30


 1/10/25 00:29   





 


 Amlodipine


 Besylate


  (NorvASC 5MG TAB)  10 mg  DAILY


 PO


   12/13/24 09:00


 1/12/25 08:59   





 


 Aspirin


  (Aspirin 81mg


 Chew Tab)  81 mg  DAILY


 PO


   12/13/24 09:00


 1/12/25 08:59   





 


 Carvedilol


  (Coreg 3.125MG)  3.125 mg  BID


 PO


   12/12/24 21:00


 1/11/25 20:59  12/12/24 21:23


3.125 MG


 


 Cinacalcet


  (Sensipar 30mg


 Tab)  30 mg  DAILY


 PO


   12/13/24 09:00


 1/12/25 08:59   





 


 Doxazosin Mesylate


  (Doxazosin


 Mesylate)  1 mg  HS


 PO


   12/12/24 21:00


 1/11/25 20:59  12/12/24 21:22


1 MG


 


 Gabapentin


  (NEURontin 100


 mg CAP)  100 mg  DAILY


 PO


   12/13/24 09:00


 1/12/25 08:59   





 


 Hydralazine HCl


  (APRESOLine 20MG


 INJ)  10 mg  Q6H  PRN


 IV


 SBP GREATER THAN 180  12/11/24 00:30


 1/10/25 00:29   





 


 Hydromorphone HCl


  (DiLAUDid 0.5MG


 INJ)  0.2 mg  Q4H  PRN


 IVP


 SEVERE PAIN (7-10)  12/11/24 00:30


 12/16/24 00:29  12/12/24 00:53


0.2 MG


 


 Labetalol HCl


  (TRANdate 20MG


 SYG)  10 mg  Q2H  PRN


 IV


 SBP GREATER THAN 180  12/11/24 00:30


 1/10/25 00:29   





 


 Lactulose


  (Constulose 20gm/


 30ml Udcup)  20 gm  Q6H  PRN


 PO


 CONSTIPATION  12/11/24 00:30


 1/10/25 00:29   





 


 Levothyroxine


 Sodium


  (SYNTHroid 88MCG


 TAB)  88 mcg  SYN


 PO


   12/13/24 06:30


 1/12/25 06:29   





 


 Ondansetron HCl


  (zoFRAN 4MG INJ)  4 mg  Q6H  PRN


 IVP


 NAUSEA/VOMITING  12/11/24 00:30


 1/10/25 00:29   





 


 Pharmacy Profile


 Note


  (Pharmacy


 Communication)  1 each  ONCE


 MISC


   12/11/24 00:30


 12/11/24 00:26 DC  





 


 Piperacillin Sod/


 Tazobactam Sod


  (Zosyn


 3.375gm+NS 50ml)  3.375 gm  Q12H


 IV


   12/11/24 00:30


 12/21/24 00:29  12/13/24 13:33


3.375 GM


 


 Vancomycin HCl


  (Vancomycin


 750mg)  750 mg  TTHSPHD


 IVPB


   12/12/24 16:00


 12/22/24 15:59  12/12/24 17:25


750 MG


 


 Vancomycin HCl


  (Vancomycin


 Protocol)  1 each  AD


 IV


   12/11/24 00:30


 12/11/24 00:25 DC  





 


 Vancomycin HCl


  (Vancomycin


 Protocol)  1 each  AD


 IV


   12/11/24 00:30


 12/25/24 00:29   





 


 Vitamin B Complex/


 Vit C/Folic Acid


  (Nephrovite


 Tablet)  1 cap  DAILY


 PO


   12/12/24 09:00


 1/11/25 08:59  12/12/24 09:11


1 CAP


 


 Vitamin B Complex/


 Vit C/Folic Acid


  (Nephrovite


 Tablet)  1 cap  DAILY


 PO


   12/13/24 09:00


 1/12/25 08:59   














LABORATORY:


[ ]








                                Hematology Labs:








Test


 12/15/24


04:40 Range/Units


 


 


White Blood Count 8.6  4.8-10.8  K/uL


 


Red Blood Count


 3.08 L


 4.00-5.50


MIL/uL


 


Hemoglobin 10.5 L 12.0-16.0  g/dL


 


Hematocrit 31.9 L 36-48  %


 


Mean Corpuscular Volume 103.6 H 79-99  fL


 


Mean Corpuscular Hemoglobin 34.1 H 27.0-33.0  pg


 


Mean Corpuscular Hemoglobin


Concent 32.9 


 32.0-36.0  g/dL





 


Red Cell Distribution Width 12.2  11.0-15.5  %


 


Platelet Count 210  130-400  K/uL


 


Mean Platelet Volume 9.9  7.5-10.5  fL


 


Immature Granulocyte % (Auto) 3.4 H 0-1  %


 


Neutrophils (%) (Auto) 68.6  40.0-77.0  %


 


Lymphocytes (%) (Auto) 17.7 L 21.0-51.0  %


 


Monocytes (%) (Auto) 8.4  3.0-13.0  %


 


Eosinophils (%) (Auto) 1.4  0.0-8.0  %


 


Basophils (%) (Auto) 0.5  0.0-5.0  %


 


Neutrophils # (Auto) 5.9  1.8-7.7  K/uL


 


Lymphocytes # (Auto) 1.5  1.0-4.8  K/uL


 


Monocytes # (Auto) 0.7  0.1-1.0  K/uL


 


Eosinophils # (Auto) 0.12  0.00-0.70  K/uL


 


Basophils # (Auto) 0.04  0.00-0.20  K/uL


 


Absolute Immature Granulocyte


(auto 0.29 


 0-1  K/uL





 


Nucleated Red Blood Cells 0.0  0.0-0.19  %








                                 Chemistry Labs:








Test


 12/16/24


11:06 12/15/24


04:40 Range/Units


 


 


Whole Blood Glucose 138 H    MG/DL


 


Sodium Level  138  136-145  mmol/L


 


Potassium Level  3.2 L 3.5-5.1  mmol/L


 


Chloride Level  98 L 101-111  mmol/L


 


Carbon Dioxide Level  32  21-32  mmol/L


 


Blood Urea Nitrogen  37 H 7-18  mg/dL


 


Creatinine  4.5 H 0.5-1.0  mg/dL


 


Glomerular Filtration Rate


Calc 


 10 


 >90  mL/min





 


Random Glucose  100    mg/dL


 


Total Calcium  8.3 L 8.5-10.1  mg/dL


 


Total Bilirubin  0.5  0.2-1.0  mg/dL


 


Aspartate Amino Transf


(AST/SGOT) 


 16 


 10-37  U/L





 


Alanine Aminotransferase


(ALT/SGPT) 


 33 


 12-78  U/L





 


Alkaline Phosphatase  227 H   U/L


 


Total Protein  6.7  6.0-8.3  g/dL


 


Albumin  2.2 L 3.5-5.0  g/dL








                                Coagulation Labs:








Test


 12/15/24


14:45 Range/Units


 


 


Prothrombin Time 11.0  9.6-11.6  SEC


 


Prothromb Time International


Ratio 0.98 


 0.85-1.15  





 


Activated Partial


Thromboplast Time 29.1 


 26.3-35.5  SEC














DIAGNOSTICS / RADIOLOGY:


REASON: RT HEPATIC FLUID COLLECTION


ORDERING PHYSICIAN: ALON GOMEZ


PROCEDURE: DRNG LIVR  -  US PERC DRN, LIVER W IMG IR





US PERC DRN, LIVER W IMG IR





REASON: RT HEPATIC FLUID COLLECTION abscess





COMPARISON: Previous CT 12/10/2024. 





TECHNIQUE: Ultrasound-guided drain placement within right lobe hepatic


abscess. 





FINDINGS: Ultrasound demonstrates complex fluid collection within the


right lobe of the liver with air foci present.





PROCEDURE:





Informed consent obtained from the patient following explanation of


risk, benefits, complications.  Timeout performed by radiology nursing


staff.  Right abdomen was prepped and draped in sterile fashion. 


Patient received intravenous titrated doses of Versed and fentanyl


administered by radiology nursing personnel with continuous monitoring


of the oxygen saturation, chronic status, respiratory status.  10 mL of


1% lidocaine subcutaneous utilized.  Under direct ultrasound guidance,


access gained into the abscess collection utilizing 18-gauge needle. 


Purulent fluid was aspirated and submitted for Gram stain and cultures. 


Guidewire was placed and visualized on ultrasound.  Fascial dilatation


performed, 8 French drain was placed within the fluid collection under


direct ultrasound guidance.  Aspirate yielded purulent fluid.  Specimen


submitted for Gram stain and cultures.  Catheter connected to external


drainage bag.  Retention suture and sterile dressing applied.  Patient


tolerated procedure well without evidence of complication.





IMPRESSION: 


  Ultrasound-guided drain placement within right lobe hepatic abscess. 


Cultures pending.











DICTATED BY: FELIPE WHITFIELD DO


DATE: 12/12/24 1606





REASON: R/O ASCENDING CHOLANGITIS


ORDERING PHYSICIAN: RADHA ZUNIGA


PROCEDURE: MRCP WWO  -  MRCP(ABDWWO)CHOLANGIOPANCREATO





MRCP(ABDWWO)CHOLANGIOPANCREATO





REASON: R/O ASCENDING CHOLANGITIS





COMPARISON: CT abdomen and pelvis 12/10/2024





TECHNIQUE: Routine imaging protocol was performed. Images were also


obtained pre and postgadolinium contrast infusion, 20 cc Clariscan IV. 





FINDINGS: 





There is a 3 x 3.4 cm fluid collection in the anterior segment of the


right lobe of the liver, relatively high under the diaphragm. This has a


short air-fluid level. There is peripheral contrast enhancement.


Findings are consistent with a hepatic abscess.





There are no other focal liver lesions. Portal and hepatic veins appear


patent. Gallbladder is absent. There is a small amount of air in the


biliary tree, probably from previous S sphincterotomy. Intrahepatic


biliary tree does not appear distended. Common duct measures between 4


and 5 mm near the head of the pancreas.





There is renal cortical thinning moderate in degree. There are small


bilateral renal cysts. Spleen and pancreas appear normal. Pancreatic


duct is not dilated. There are no focal fluid collections. There is no


free air or fluid. Anterior abdominal wall appears intact.





IMPRESSION: 


  


1. 3 x 3.4 cm focal fluid collection in the anterior segment right lobe


of the liver, with an air-fluid level, consistent with abscess.


2. Absent gallbladder


3. Moderate bilateral renal cortical thinning, there are also bilateral


renal cysts.











DICTATED BY: JOSE CERVANTES MD


DATE: 12/11/24 1434





REASON: bilateral flank pain, ruq pain


ORDERING PHYSICIAN: BONNIE THOMPSON


PROCEDURE: ABD PEL WO  -  CT ABDOMEN/PELVIS W/O CONTRAST





CT ABDOMEN/PELVIS W/O CONTRAST





HISTORY:  Bilateral flank pain





COMPARISON: 11/5/2016





TECHNIQUE: Multiple sequential axial images of the abdomen and pelvis


were obtained from the dome of the diaphragm through symphysis pubis.


Patient was not given contrast through intravenous route. Oral contrast


was not given.





FINDINGS:  No pleural effusion is seen bilaterally.  There is no


evidence of parenchymal disease or pulmonary nodule of the visualized


lower lungs.  Degenerative changes of the thoracolumbar spine are


present. The heart is not enlarged.





There is 3.7 cm right hepatic nodule. Intrahepatic biliary air is seen.


Postcholecystectomy changes are seen. There are bilateral renal cortical


scarring. Bilateral renal vascular calcifications are seen. The liver,


spleen, adrenal glands and pancreas are unremarkable.  There is no


evidence of hydronephrosis bilaterally.  No evidence of renal stone is


seen. There is diverticulosis.  Fecal material is seen in the colon. 


There are normal size retroperitoneal and mesenteric lymph nodes.  No


ascites is seen.  Atherosclerotic changes are present. Uterus is


enlarged with calcifications suggestive of fibroid uterus.





Pelvic sidewalls are symmetric bilaterally. Bladder is poorly distended.





IMPRESSION:


1.  There is 3.7 cm right hepatic mass with neoplastic process not


excluded. Intrahepatic biliary air is seen with postcholecystectomy


changes. Fibroid uterus. Diverticulosis.

















CT was performed with one or more following dose reduction techniques:


automated exposure control, adjustment of the mA and kv according to


patient's size, or use of a iterative reconstruction technique.








DICTATED BY: SVEN GONZALEZ MD


DATE: 12/10/24 2220..REASON: cp


ORDERING PHYSICIAN: BONNIE THOMPSON


PROCEDURE: CXR1VW  -  CHEST 1VW





CHEST 1VW





REASON: cp





COMPARISON: 12/7/2024





FINDINGS:  


Single view of the chest was obtained. Lungs are clear.  There is mild


cardiac megaly, unchanged.  There is no pulmonary vascular congestion.


Mediastinum and bony thorax appear unremarkable.





IMPRESSION:





1.  Mild cardiomegaly, unchanged no acute finding.








DICTATED BY: JOSE CERVANTES MD


DATE: 12/11/24 0929





ASSESSMENT:  


End stage renal disease 


Anemia 


Sepsis


Hepatic Abscess 


Chronic Congestive Diastolic Heart Failure , Echo 2023 EF of 65% 


Primary hypertension 


HLD 


Diabetes mellitus II 





PLAN: 


Labs and Diagnostics/ Radiology personally reviewed and interpreted by myself 

and supervising physician


We have reviewed dialysis and external records in detail


Continue dialysis schedule Tuesday Thursday Saturday 


Continue with antibiotics as per ID.  


Case management coordinating SNF placement


1.5 L fluid restriction


Continue to monitor H&H


Epogen on dialysis days, as needed 


Continue with frequent monitoring of renal function, anemia, and electrolytes


Order CBC, BMP, and electrolytes in the morning


May use Dilaudid 0.5 mg IV every 6 hours as needed for severe pain


Monitor blood pressure adjust medication doses as needed


Maintain normotensive state


Strict intake, output, and daily weight should be monitored


Please renally adjust medications.


Avoid nephrotoxics and nonsteroidal drugs.


We will continue to monitor the patient closely


We have discussed with the other team physicians in detail about the care plan


ATTESTATION BY PHYSICIAN


I have seen and examined the patient. I reviewed the documentation, medical 

decision making, and treatment plan as noted by the mid-level provider above. I 

agree with the findings and plan of care.











PRISCA ASHER MD, ELIZABETH FNMARY             Dec 16, 2024 13:52

## 2024-12-16 NOTE — PN
INFECTIOUS DISEASE  PROGRESS NOTE








Date of Service: Dec 16, 2024





SUBJECTIVE:


This is a 74-year-old female patient with past medical history of diabetes 

mellitus and end-stage renal disease, on dialysis who presented to the hospital 

with chief complaint of generalized body weakness, and right upper abdominal 

quadrant pain.  An MRCP showed fluid collection in the anterior segment of the 

right lobe of the liver consistent with abscess and the reason for this consult.


Patient was seen and examined at bedside in room 330.  


Patient is status post percutaneous drain placement on 12/12/2024.  Hepatic 

fluid aspirate cultures positive for polymicrobial infection with Enterococcus 

Raffinosus, E coli and Klebsiella oxytoca.  No reports of fever, temperature is 

98.2.   We will discontinue vancomycin and continue on Zosyn IV.  We will 

continue to monitor patient's care.





PHYSICAL EXAM


EYES:  Anicteric.  Pupils equal and reactive.


HENT: No oral thrush seen, moist Oral mucosa.


NECK:  Supple, no JVD or thyromegaly.


LUNGS:  Good air entry.  No rales, no rhonchi.


CARDIOVASCULAR:  S1, S2 regular.  No murmur heard.


ABDOMEN:  Soft, non tender, bowel sounds present, no organomegaly.  Right 

percutaneous drainage catheter.


CENTRAL NERVOUS SYSTEM:  Awake, alert, oriented x 3.  


SKIN:  No rashes, no swelling.


LYMPHATICS: No peripheral lymphadenopathy.


MUSCULOSKELETAL:  No joint swelling, erythema or tenderness.


EXTREMITIES:  No cyanosis or clubbing.


BACK:  No deformity, no pressure ulcer.


GENITOURINARY:  No dysuria or hematuria.








Vital Sign (Last 12 Hours)








 12/16/24 12/16/24 12/16/24 12/16/24





 11:22 16:37 19:42 20:00


 


Temp 98.1 98.1  98.1


 


Pulse 61 64 65 65


 


Resp 17 17 20 18


 


B/P (MAP) 139/53 122/53  141/67


 


Pulse Ox 96 98  96


 


O2 Delivery Nasal Cannula Nasal Cannula N/Cannula Low lpm Nasal Cannula


 


O2 Flow Rate 3.5 3.5 2.0 2.5


 


FiO2   28 


 


    





 12/16/24   





 20:58   


 


B/P (MAP) 141/67   














Intake & Output (last 24hrs)   


 


 12/15/24 12/15/24 12/16/24





 15:00 23:00 07:00


 


Intake Total 400 ml 150 ml 


 


Balance 400 ml 150 ml 











LABS:








                                   Laboratory:








Test


 12/16/24


19:42 12/15/24


14:45 12/15/24


04:40 Range/Units


 


 


Whole Blood Glucose 127 H     MG/DL


 


Prothrombin Time  11.0   9.6-11.6  SEC


 


Prothromb Time International


Ratio 


 0.98 


 


 0.85-1.15  





 


Activated Partial


Thromboplast Time 


 29.1 


 


 26.3-35.5  SEC





 


White Blood Count   8.6  4.8-10.8  K/uL


 


Red Blood Count


 


 


 3.08 L


 4.00-5.50


MIL/uL


 


Hemoglobin   10.5 L 12.0-16.0  g/dL


 


Hematocrit   31.9 L 36-48  %


 


Mean Corpuscular Volume   103.6 H 79-99  fL


 


Mean Corpuscular Hemoglobin   34.1 H 27.0-33.0  pg


 


Mean Corpuscular Hemoglobin


Concent 


 


 32.9 


 32.0-36.0  g/dL





 


Red Cell Distribution Width   12.2  11.0-15.5  %


 


Platelet Count   210  130-400  K/uL


 


Mean Platelet Volume   9.9  7.5-10.5  fL


 


Immature Granulocyte % (Auto)   3.4 H 0-1  %


 


Neutrophils (%) (Auto)   68.6  40.0-77.0  %


 


Lymphocytes (%) (Auto)   17.7 L 21.0-51.0  %


 


Monocytes (%) (Auto)   8.4  3.0-13.0  %


 


Eosinophils (%) (Auto)   1.4  0.0-8.0  %


 


Basophils (%) (Auto)   0.5  0.0-5.0  %


 


Neutrophils # (Auto)   5.9  1.8-7.7  K/uL


 


Lymphocytes # (Auto)   1.5  1.0-4.8  K/uL


 


Monocytes # (Auto)   0.7  0.1-1.0  K/uL


 


Eosinophils # (Auto)   0.12  0.00-0.70  K/uL


 


Basophils # (Auto)   0.04  0.00-0.20  K/uL


 


Absolute Immature Granulocyte


(auto 


 


 0.29 


 0-1  K/uL





 


Nucleated Red Blood Cells   0.0  0.0-0.19  %


 


Sodium Level   138  136-145  mmol/L


 


Potassium Level   3.2 L 3.5-5.1  mmol/L


 


Chloride Level   98 L 101-111  mmol/L


 


Carbon Dioxide Level   32  21-32  mmol/L


 


Blood Urea Nitrogen   37 H 7-18  mg/dL


 


Creatinine   4.5 H 0.5-1.0  mg/dL


 


Glomerular Filtration Rate


Calc 


 


 10 


 >90  mL/min





 


Random Glucose   100    mg/dL


 


Total Calcium   8.3 L 8.5-10.1  mg/dL


 


Total Bilirubin   0.5  0.2-1.0  mg/dL


 


Aspartate Amino Transf


(AST/SGOT) 


 


 16 


 10-37  U/L





 


Alanine Aminotransferase


(ALT/SGPT) 


 


 33 


 12-78  U/L





 


Alkaline Phosphatase   227 H   U/L


 


Total Protein   6.7  6.0-8.3  g/dL


 


Albumin   2.2 L 3.5-5.0  g/dL














ASSESSMENT:


Liver abscess status post percutaneous drain placement on 12/12/2024.  


Hepatic fluid aspirate Polymicrobial infection with Enterococcus Raffinosus, E 

coli and Klebsiella oxytoca.


Leukocytosis, resolving.


End-stage renal disease, on dialysis.  


Diabetes mellitus.  


Anemia.  





PLAN:


Discontinue vancomycin.  


Continue Zosyn IV.


Continue pain management.


Monitoring drain output.


Monitor electrolytes.


Continue dialysis as recommended by nephrologist.





This case was reviewed and discussed with my supervising physician and the above

assessment and plan was formulated and agreed upon.


ATTESTATION BY PHYSICIAN


I have seen and examined the patient. I reviewed the documentation, medical 

decision making, and treatment plan as noted by the mid-level provider above. I 

agree with the findings and plan of care.











DULCE MARIA GARZA MD, MIRTA L [facility]             Dec 16, 2024 21:24

## 2024-12-16 NOTE — PN
[facility] PROGRESS NOTE 





Date Patient Seen: Dec 16, 2024 


Time of Visit: 13:53


Supervising Physician: JODY STRAUSS MD





Primary Care Physician: [Dr. Delgado Isaacs ]


Outpatient Specialists: [ Dr. Swan, nephro ]]


Inpatient Consults:  General surgery, Infectious Disease, GI, Nephrology





PROBLEM LIST:


Sepsis secondary to 3x3.4 Hepatic Abscess + AMEBIASIS 


   S/P IR drain placement within rt lobe hepatic abscess, culture positive for 

polymicrobial infection   


ESRD HD TTS 


Chronic Congestive Diastolic Heart Failure , Echo 2023 EF of 65% 


Primary hypertension 


Hyperlipidemia


Diabetes mellitus II Hg A1c of 7.2 % from 2019


LA grade B reflux esophagitis w/ no bleeding on EGD 12/14/24


non bleeding gastric ulcers on EGD 12/14/24


2 mm polyp in cecum s/p resection on colonoscopy 12/14/24


3-5mm polyp in descending colon and transverse colon on colonoscopy 12/14/24


none bleeding internal hemorrhoids on colonoscopy 12/14/24





INTERVAL HISTORY:


Pt remains weak at this point, In good spirits, following commands , toleraing 

po


Drain in place to RUQ- total of 20cc overnight.  Continues on IV abx:  

Vancomycin and Zosyn.  Per Fluid cultures , + E Histolytica - will Start 

Metronidazole 


Will need SNF placement for abx/rehab/drain management


Per General Surgery , drain to remain in place and will Follow up outpatient for

drain removal . 


Nursing staff to make appt prior to DC . 


Id consult at SNF for vanco mgt.





REVIEW OF SYSTEMS: 


12 point ROS reviewed with patient. Pertinent positives mentioned above. 

Otherwise negative.





PHYSICAL EXAM: 


GENERAL: alert, weak, awake oriented x 3


HEENT: EOMI, Sclera non icteric, moist mucosa 


NECK: Supple, no JVD, trachea midline 


LUNGS: Clear breath sounds bilaterally. No wheezes


HEART: Regular rate and rhythm. Normal S1 and S2, without murmurs 


ABD: Abdomen soft but tender on RUQ. Bowel sounds present,


EXT: No clubbing cyanosis or edema


NEURO: Alert and oriented to person, follows commands





                             Vital Signs (last 8hr)








  Date Time  Temp Pulse Resp B/P (MAP) Pulse Ox O2 Delivery O2 Flow Rate FiO2


 


12/16/24 11:22 98.1 61 17 139/53 96 Nasal Cannula 3.5 


 


12/16/24 08:26    147/59    


 


12/16/24 07:51 98.2 63 17 147/59 95 Nasal Cannula 3.5 


 


12/16/24 07:16  65 20   N/Cannula Low lpm 3.5 











LABS:





                                Hematology Labs:








Test


 12/15/24


04:40 Range/Units


 


 


White Blood Count 8.6  4.8-10.8  K/uL


 


Red Blood Count


 3.08 L


 4.00-5.50


MIL/uL


 


Hemoglobin 10.5 L 12.0-16.0  g/dL


 


Hematocrit 31.9 L 36-48  %


 


Mean Corpuscular Volume 103.6 H 79-99  fL


 


Mean Corpuscular Hemoglobin 34.1 H 27.0-33.0  pg


 


Mean Corpuscular Hemoglobin


Concent 32.9 


 32.0-36.0  g/dL





 


Red Cell Distribution Width 12.2  11.0-15.5  %


 


Platelet Count 210  130-400  K/uL


 


Mean Platelet Volume 9.9  7.5-10.5  fL


 


Immature Granulocyte % (Auto) 3.4 H 0-1  %


 


Neutrophils (%) (Auto) 68.6  40.0-77.0  %


 


Lymphocytes (%) (Auto) 17.7 L 21.0-51.0  %


 


Monocytes (%) (Auto) 8.4  3.0-13.0  %


 


Eosinophils (%) (Auto) 1.4  0.0-8.0  %


 


Basophils (%) (Auto) 0.5  0.0-5.0  %


 


Neutrophils # (Auto) 5.9  1.8-7.7  K/uL


 


Lymphocytes # (Auto) 1.5  1.0-4.8  K/uL


 


Monocytes # (Auto) 0.7  0.1-1.0  K/uL


 


Eosinophils # (Auto) 0.12  0.00-0.70  K/uL


 


Basophils # (Auto) 0.04  0.00-0.20  K/uL


 


Absolute Immature Granulocyte


(auto 0.29 


 0-1  K/uL





 


Nucleated Red Blood Cells 0.0  0.0-0.19  %








                                 Chemistry Labs:








Test


 12/16/24


11:06 12/15/24


04:40 Range/Units


 


 


Whole Blood Glucose 138 H    MG/DL


 


Sodium Level  138  136-145  mmol/L


 


Potassium Level  3.2 L 3.5-5.1  mmol/L


 


Chloride Level  98 L 101-111  mmol/L


 


Carbon Dioxide Level  32  21-32  mmol/L


 


Blood Urea Nitrogen  37 H 7-18  mg/dL


 


Creatinine  4.5 H 0.5-1.0  mg/dL


 


Glomerular Filtration Rate


Calc 


 10 


 >90  mL/min





 


Random Glucose  100    mg/dL


 


Total Calcium  8.3 L 8.5-10.1  mg/dL


 


Total Bilirubin  0.5  0.2-1.0  mg/dL


 


Aspartate Amino Transf


(AST/SGOT) 


 16 


 10-37  U/L





 


Alanine Aminotransferase


(ALT/SGPT) 


 33 


 12-78  U/L





 


Alkaline Phosphatase  227 H   U/L


 


Total Protein  6.7  6.0-8.3  g/dL


 


Albumin  2.2 L 3.5-5.0  g/dL








                                Coagulation Labs:








Test


 12/15/24


14:45 Range/Units


 


 


Prothrombin Time 11.0  9.6-11.6  SEC


 


Prothromb Time International


Ratio 0.98 


 0.85-1.15  





 


Activated Partial


Thromboplast Time 29.1 


 26.3-35.5  SEC














DIAGNOSTICS / RADIOLOGY RESULTS:


[ ]





Plan:


Neuro: 


Minimize central acting medications as possible. 


Maintain fall precautions, adequate lighting during the day





Cardiovascular: 


Follow hemodynamics. 


Vital signs per facility protocol





Pulmonary: 


Supplemental 02 as needed. 


Maintain aspiration precautions at all times





GI and nutrition: 


Continue with nutritional support.


Continue stool softeners and laxatives as needed.





Kidney and electrolytes:


Strict monitoring of intake, output and overall fluid balance. 


Avoid nephrotoxic medications to the extent possible. 


Medications to be dosed according to renal function.


Monitor electrolytes and replace as needed





Endocrine:


Maintain blood glucose between 100-180 at all times.


Hypoglycemia protocol in place





Infectious disease: 


Trend temperature, WBC and procalcitonin level


Follow cultures, deescalate antibiotics as soon as possible.


Panculture if new onset fever





Oncology/Hematology/Coagulation: 


Monitor for s/s of bleeding


Monitor hemoglobin, coagulation studies as needed





Skin:


Pressure ulcer prevention per facility protocol


Specialty mattress





Ortho/Rehab:


Continue PT/OT 





Prophylaxis:.


Continue GI and DVT prophylaxis as appropriate





Disposition:. 


SNF





Total patient care time excluding any procedures:  40 min











ALON GOMEZ              Dec 16, 2024 13:55

## 2024-12-17 VITALS — RESPIRATION RATE: 16 BRPM | HEART RATE: 69 BPM | SYSTOLIC BLOOD PRESSURE: 116 MMHG | DIASTOLIC BLOOD PRESSURE: 51 MMHG

## 2024-12-17 VITALS
HEART RATE: 70 BPM | TEMPERATURE: 98.4 F | DIASTOLIC BLOOD PRESSURE: 63 MMHG | RESPIRATION RATE: 18 BRPM | SYSTOLIC BLOOD PRESSURE: 139 MMHG

## 2024-12-17 VITALS
DIASTOLIC BLOOD PRESSURE: 54 MMHG | SYSTOLIC BLOOD PRESSURE: 115 MMHG | HEART RATE: 74 BPM | TEMPERATURE: 98.3 F | RESPIRATION RATE: 18 BRPM

## 2024-12-17 VITALS
TEMPERATURE: 97.7 F | SYSTOLIC BLOOD PRESSURE: 127 MMHG | DIASTOLIC BLOOD PRESSURE: 62 MMHG | HEART RATE: 70 BPM | RESPIRATION RATE: 16 BRPM

## 2024-12-17 VITALS
SYSTOLIC BLOOD PRESSURE: 130 MMHG | DIASTOLIC BLOOD PRESSURE: 59 MMHG | RESPIRATION RATE: 19 BRPM | HEART RATE: 66 BPM | TEMPERATURE: 97.8 F

## 2024-12-17 VITALS — RESPIRATION RATE: 16 BRPM | SYSTOLIC BLOOD PRESSURE: 140 MMHG | HEART RATE: 68 BPM | DIASTOLIC BLOOD PRESSURE: 87 MMHG

## 2024-12-17 VITALS
HEART RATE: 67 BPM | DIASTOLIC BLOOD PRESSURE: 54 MMHG | SYSTOLIC BLOOD PRESSURE: 111 MMHG | RESPIRATION RATE: 17 BRPM | TEMPERATURE: 98.7 F

## 2024-12-17 VITALS
SYSTOLIC BLOOD PRESSURE: 144 MMHG | TEMPERATURE: 98.1 F | HEART RATE: 66 BPM | RESPIRATION RATE: 16 BRPM | DIASTOLIC BLOOD PRESSURE: 56 MMHG

## 2024-12-17 VITALS — RESPIRATION RATE: 16 BRPM | HEART RATE: 60 BPM | DIASTOLIC BLOOD PRESSURE: 90 MMHG | SYSTOLIC BLOOD PRESSURE: 127 MMHG

## 2024-12-17 VITALS — SYSTOLIC BLOOD PRESSURE: 140 MMHG | HEART RATE: 64 BPM | RESPIRATION RATE: 16 BRPM | DIASTOLIC BLOOD PRESSURE: 64 MMHG

## 2024-12-17 VITALS
HEART RATE: 62 BPM | DIASTOLIC BLOOD PRESSURE: 61 MMHG | RESPIRATION RATE: 16 BRPM | SYSTOLIC BLOOD PRESSURE: 138 MMHG | TEMPERATURE: 98.1 F

## 2024-12-17 VITALS — RESPIRATION RATE: 16 BRPM | SYSTOLIC BLOOD PRESSURE: 140 MMHG | HEART RATE: 69 BPM | DIASTOLIC BLOOD PRESSURE: 66 MMHG

## 2024-12-17 VITALS — RESPIRATION RATE: 16 BRPM | DIASTOLIC BLOOD PRESSURE: 57 MMHG | SYSTOLIC BLOOD PRESSURE: 125 MMHG | HEART RATE: 70 BPM

## 2024-12-17 VITALS
HEART RATE: 64 BPM | TEMPERATURE: 97.7 F | DIASTOLIC BLOOD PRESSURE: 55 MMHG | SYSTOLIC BLOOD PRESSURE: 119 MMHG | RESPIRATION RATE: 16 BRPM

## 2024-12-17 VITALS — DIASTOLIC BLOOD PRESSURE: 66 MMHG | HEART RATE: 67 BPM | SYSTOLIC BLOOD PRESSURE: 144 MMHG | RESPIRATION RATE: 16 BRPM

## 2024-12-17 VITALS — HEART RATE: 65 BPM | DIASTOLIC BLOOD PRESSURE: 57 MMHG | RESPIRATION RATE: 16 BRPM | SYSTOLIC BLOOD PRESSURE: 142 MMHG

## 2024-12-17 VITALS — HEART RATE: 74 BPM | RESPIRATION RATE: 18 BRPM | OXYGEN SATURATION: 99 %

## 2024-12-17 VITALS
RESPIRATION RATE: 17 BRPM | DIASTOLIC BLOOD PRESSURE: 65 MMHG | HEART RATE: 68 BPM | SYSTOLIC BLOOD PRESSURE: 129 MMHG | TEMPERATURE: 98.7 F

## 2024-12-17 VITALS
HEART RATE: 65 BPM | DIASTOLIC BLOOD PRESSURE: 62 MMHG | RESPIRATION RATE: 17 BRPM | SYSTOLIC BLOOD PRESSURE: 140 MMHG | TEMPERATURE: 98 F

## 2024-12-17 VITALS — OXYGEN SATURATION: 96 % | RESPIRATION RATE: 18 BRPM | HEART RATE: 63 BPM

## 2024-12-17 VITALS — OXYGEN SATURATION: 97 %

## 2024-12-17 VITALS — DIASTOLIC BLOOD PRESSURE: 62 MMHG | HEART RATE: 63 BPM | SYSTOLIC BLOOD PRESSURE: 143 MMHG | RESPIRATION RATE: 16 BRPM

## 2024-12-17 VITALS — RESPIRATION RATE: 16 BRPM | DIASTOLIC BLOOD PRESSURE: 62 MMHG | HEART RATE: 64 BPM | SYSTOLIC BLOOD PRESSURE: 140 MMHG

## 2024-12-17 VITALS — OXYGEN SATURATION: 95 %

## 2024-12-17 VITALS — RESPIRATION RATE: 16 BRPM | SYSTOLIC BLOOD PRESSURE: 132 MMHG | DIASTOLIC BLOOD PRESSURE: 61 MMHG | HEART RATE: 68 BPM

## 2024-12-17 LAB
BASOPHILS # BLD AUTO: 0.06 K/UL (ref 0–0.2)
BASOPHILS NFR BLD AUTO: 0.7 % (ref 0–5)
BUN SERPL-MCNC: 59 MG/DL (ref 7–18)
CELLS COUNTED: 100
CHLORIDE SERPL-SCNC: 98 MMOL/L (ref 101–111)
CO2 SERPL-SCNC: 28 MMOL/L (ref 21–32)
CREAT SERPL-MCNC: 6.8 MG/DL (ref 0.5–1)
EOSINOPHIL # BLD AUTO: 0.34 K/UL (ref 0–0.7)
EOSINOPHIL NFR BLD AUTO: 3.7 % (ref 0–8)
EOSINOPHIL NFR BLD MANUAL: 2 % (ref 1–6)
ERYTHROCYTE [DISTWIDTH] IN BLOOD BY AUTOMATED COUNT: 12 % (ref 11–15.5)
GFR SERPL CREATININE-BSD FRML MDRD: 6 ML/MIN (ref 90–?)
GLUCOSE SERPL-MCNC: 93 MG/DL (ref 70–105)
HCT VFR BLD AUTO: 29 % (ref 36–48)
IMM GRANULOCYTES # BLD: 0.37 K/UL (ref 0–1)
LYMPHOCYTES # SPEC AUTO: 2.5 K/UL (ref 1–4.8)
LYMPHOCYTES NFR BLD MANUAL: 34 % (ref 22–44)
LYMPHOCYTES NFR SPEC AUTO: 27 % (ref 21–51)
MANUAL DIF COMMENT BLD-IMP: (no result)
MCH RBC QN AUTO: 34.6 PG (ref 27–33)
MCHC RBC AUTO-ENTMCNC: 34.1 G/DL (ref 32–36)
MCV RBC AUTO: 101.4 FL (ref 79–99)
MONOCYTES # BLD AUTO: 0.8 K/UL (ref 0.1–1)
MONOCYTES NFR BLD AUTO: 9.3 % (ref 3–13)
MONOCYTES NFR BLD MANUAL: 8 % (ref 2–9)
NEUTROPHILS # BLD AUTO: 5 K/UL (ref 1.8–7.7)
NEUTROPHILS NFR BLD AUTO: 55.2 % (ref 40–77)
NEUTS SEG NFR BLD MANUAL: 56 % (ref 40–70)
NRBC BLD MANUAL-RTO: 0 % (ref 0–0.19)
PHOSPHATE SERPL-MCNC: 7.8 MG/DL (ref 2.5–4.9)
PLAT MORPH BLD: ADEQUATE
PLATELET # BLD AUTO: 220 K/UL (ref 130–400)
POTASSIUM SERPL-SCNC: 3.5 MMOL/L (ref 3.5–5.1)
RBC # BLD AUTO: 2.86 MIL/UL (ref 4–5.5)
RBC MORPH BLD: (no result)
SODIUM SERPL-SCNC: 137 MMOL/L (ref 136–145)
VANCOMYCIN SERPL-MCNC: 21.7 MCG/ML (ref 20–30)
WBC # BLD AUTO: 9.1 K/UL (ref 4.8–10.8)
WBC MORPH BLD: NORMAL

## 2024-12-17 PROCEDURE — 5A1D70Z PERFORMANCE OF URINARY FILTRATION, INTERMITTENT, LESS THAN 6 HOURS PER DAY: ICD-10-PCS | Performed by: INTERNAL MEDICINE

## 2024-12-17 RX ADMIN — Medication PRN MG: at 05:52

## 2024-12-17 NOTE — PN
NEPHROLOGY NOTE



SUBJECTIVE:  The patient has been evaluated, seen for dialysis and seen multiple

times.  The patient has multiple other comorbidities.  No fever, chills or 

rigors.  No cough, expectoration, or hemoptysis.  No abdominal pain. No nausea 

or vomiting.  No other associated finding.  Other systemic review is unchanged. 

The patient has been weak.  Further systemic review is unchanged.



PLAN:  To continue monitoring.  Follow up on renal function.  Follow up on 

electrolytes.  Intake, output, and weight will be monitored.  The patient was 

evaluated and seen for dialysis and seen multiple times.  We will be monitoring 

closely.  I have discussed with other team members.



Thank you for this patient.







DD: 12/17/2024 10:33 PM

DT: 12/17/2024 11:12 PM

TID: [id number] RECEIPT: [id number]

## 2024-12-17 NOTE — PN
NEPHROLOGY NOTE



SUBJECTIVE:  This patient has been evaluated and seen for dialysis and seen 

several times.  The patient has no other associated finding.  No other 

aggravating or relieving factors.  The patient is weak.  Other systemic review 

is unchanged.



PHYSICAL EXAMINATION:

GENERAL:  Pale, no other distress.

VITAL SIGNS:  Blood pressure is 118/70, respiratory rate is 18, afebrile.

HEENT:  Head is atraumatic.  Pupils are round and reactive.  Sclerae are 

anicteric.  Conjunctivae not pale.  Oral mucosa is not dry.

NECK:  Supple.  No masses or bruits.  Thyroid is palpable.  Neck has no bruits.

CHEST:  Shows equal to thoracic percussion note being resonant in all areas.

CARDIAC:  Regular rhythm. No rub. No S3, S4.  No parasternal heave.

ABDOMEN:  No guarding or tenderness.  Bowel sounds are normoactive.  No free 

fluid.

EXTREMITIES:  No edema.  No cyanosis or clubbing.



LABORATORY DATA:  I have reviewed available labs in detail.



IMAGING STUDIES:  Personally reviewed.



PROBLEMS:  Renal failure, anemia, and shortness of breath.



PLAN:  To continue dialysis support.  Follow up on renal function.  Follow up on

electrolytes.  The patient has been counseled on the diet.  Intake, output, 

weight, and overall condition will be monitored.  The patient was evaluated and 

seen for dialysis and seen multiple times.  We will be monitoring closely.  I 

have discussed with other team members in detail.







DD: 12/17/2024 08:22 PM

DT: 12/17/2024 09:02 PM

TID: [id number] RECEIPT: [id number]

## 2024-12-17 NOTE — PN
GASTROENTEROLOGY PROGRESS NOTE








Date of Visit: Dec 17, 2024 


Time of Visit: 19:30





Events / Notes:


No acute events overnight.  Patient underwent IR drainage of liver abscess. EGD 

revealing LA grade B reflux esophagitis with nonbleeding gastric ulcers and 

colonoscopy revealing colon polyp status post polypectomy.  Nonbleeding internal

hemorrhoids.





Review of Systems:


CONSTITUTIONAL: No malaise or change in sensation of wellbeing.


ENMT: No rhinorrhea, otorrhea, sinus pain, ear ache.


CARDIOVASCULAR: No angina, palpitations, orthopnea or paroxysmal dyspnea.


RESPIRATORY: No SOB.


GASTROINTESTINAL: No abdominal pain, nausea, vomiting, diarrhea, hematemesis, 

melena or change in the patient's habitual bowel movements consistency/number.


GENITOURINARY: No dysuria, hematuria or change in bladder continence.


MUSCULOSKELETAL: No new muscle pain or decrease in muscular strength. No new 

joint swelling, redness or tenderness.


SKIN: No new rash.





Physical Exam:


GEN: Awake, alert, oriented in person, time and place, and in no acute distress.


HEENT: No sinus tenderness. Tympanic membranes were not examined. No rhinorrhea.

Oral pharyngeal mucosa is pink, moist and within normal limits. Neck is supple 

with no cervical lymphadenopathy, thyromegaly or JVD.


CHEST: Inspection, palpation and percussion of the chest were unremarkable. Lung

auscultation revealed normal breath sounds bilaterally.


CARDIAC: PMI is within normal limits. Heart sounds are regular. Normal S1, S2. 

No gallop or murmur.


ABD: Soft, non-tender and not distended. No peritoneal signs on palpation. No 

organomegaly. Normal bowel sounds.


EXT: No cyanosis or clubbing. No edema.


SKIN: Intact. No rashes.


JOINTS: No evidence of synovitis or acute arthritis.


NEURO: Alert and oriented to name, place and person. Cranial nerve examination 

is unremarkable. No focal motor deficits. Normal speech. Gait is normal. 

Strength is normal.





                             Vital Signs (last 8hr)








  Date Time  Temp Pulse Resp B/P (MAP) Pulse Ox O2 Delivery O2 Flow Rate FiO2


 


12/17/24 19:13  74 18   N/Cannula Low lpm 2.0 28


 


12/17/24 17:30 97.7 70 16 127/62  Nasal Cannula 2.0 


 


12/17/24 17:15 97.7 64 16 119/55  Nasal Cannula 2.0 


 


12/17/24 17:00  69 16 116/51  Nasal Cannula 2.0 


 


12/17/24 16:45  70 16 125/57  Nasal Cannula 2.0 


 


12/17/24 16:30  67 16 144/66  Nasal Cannula 2.0 


 


12/17/24 16:28 98.1 65 17 140/62 96 Nasal Cannula 2.0 


 


12/17/24 16:15  68 16 132/61  Nasal Cannula 2.0 


 


12/17/24 16:00  69 16 140/66  Nasal Cannula 2.0 


 


12/17/24 15:45  68 16 140/87  Nasal Cannula 2.0 


 


12/17/24 15:30  64 16 140/62  Nasal Cannula 2.0 


 


12/17/24 15:15  63 16 143/62  Nasal Cannula 2.0 


 


12/17/24 15:00  60 16 127/90  Nasal Cannula 2.0 


 


12/17/24 14:45  65 16 142/57  Nasal Cannula 2.0 


 


12/17/24 14:30  64 16 140/64  Nasal Cannula 2.0 


 


12/17/24 14:15 98.1 62 16 138/61  Nasal Cannula 2.0 


 


12/17/24 14:05 98.1 66 16 144/56  Nasal Cannula 2.0 


 


12/17/24 11:46 98.8 68 17 129/65 95 Nasal Cannula 2.0 











Laboratory:


[ ]








                                   Laboratory:








Test


 12/17/24


15:41 12/17/24


04:40 Range/Units


 


 


Whole Blood Glucose 107     MG/DL


 


White Blood Count  9.1  4.8-10.8  K/uL


 


Red Blood Count


 


 2.86 L


 4.00-5.50


MIL/uL


 


Hemoglobin  9.9 L 12.0-16.0  g/dL


 


Hematocrit  29.0 L 36-48  %


 


Mean Corpuscular Volume  101.4 H 79-99  fL


 


Mean Corpuscular Hemoglobin  34.6 H 27.0-33.0  pg


 


Mean Corpuscular Hemoglobin


Concent 


 34.1 


 32.0-36.0  g/dL





 


Red Cell Distribution Width  12.0  11.0-15.5  %


 


Platelet Count  220  130-400  K/uL


 


Mean Platelet Volume  9.8  7.5-10.5  fL


 


Immature Granulocyte % (Auto)  4.1 H 0-1  %


 


Neutrophils (%) (Auto)  55.2  40.0-77.0  %


 


Lymphocytes (%) (Auto)  27.0  21.0-51.0  %


 


Monocytes (%) (Auto)  9.3  3.0-13.0  %


 


Eosinophils (%) (Auto)  3.7  0.0-8.0  %


 


Basophils (%) (Auto)  0.7  0.0-5.0  %


 


Neutrophils # (Auto)  5.0  1.8-7.7  K/uL


 


Lymphocytes # (Auto)  2.5  1.0-4.8  K/uL


 


Monocytes # (Auto)  0.8  0.1-1.0  K/uL


 


Eosinophils # (Auto)  0.34  0.00-0.70  K/uL


 


Basophils # (Auto)  0.06  0.00-0.20  K/uL


 


Absolute Immature Granulocyte


(auto 


 0.37 


 0-1  K/uL





 


Segmented Neutrophils %  56  40-70  %


 


Lymphocytes % (Manual)  34  22-44  %


 


Monocytes % (Manual)  8  2-9  %


 


Eosinophils % (Manual)  2  1-6  %


 


Nucleated Red Blood Cells  0.0  0.0-0.19  %


 


Differential Comment


 


 MANUAL


DIFFERENTIAL  





 


White Cell Morphology Comment  NORMAL   


 


Platelet Morphology Comment  ADEQUATE   


 


Red Blood Cell Morphology  ANISO 2+   


 


Sodium Level  137  136-145  mmol/L


 


Potassium Level  3.5  3.5-5.1  mmol/L


 


Chloride Level  98 L 101-111  mmol/L


 


Carbon Dioxide Level  28  21-32  mmol/L


 


Blood Urea Nitrogen  59 H 7-18  mg/dL


 


Creatinine  6.8 H 0.5-1.0  mg/dL


 


Glomerular Filtration Rate


Calc 


 6 


 >90  mL/min





 


Random Glucose  93    mg/dL


 


Total Calcium  7.4 L 8.5-10.1  mg/dL


 


Phosphorus Level  7.8 H 2.5-4.9  mg/dL


 


Vancomycin Level


 


 21.7 


 20.0-30.0


mcg/mL











                               Current Medications








 Medications


  (Trade)  Dose


 Ordered  Sig/Adenike


 Route


 PRN Reason  Start Time


 Stop Time Status Last Admin


Dose Admin


 


 Acetaminophen


  (TYLenol 325MG


 TAB)  650 mg  Q6H  PRN


 PO


 FEVER/MILD PAIN LEVEL 1-3  12/11/24 00:30


 1/10/25 00:29  12/12/24 09:12


650 MG


 


 Albuterol Sulfate


  (Proventil


 0.083% 2.5mg/3ml)  2.5 mg  [id number]  PRN


 IH


 SHORTNESS OF BREATH  12/11/24 00:30


 1/10/25 00:29   





 


 Amlodipine


 Besylate


  (NorvASC 5MG TAB)  10 mg  DAILY


 PO


   12/13/24 09:00


 1/12/25 08:59  12/17/24 08:18


10 MG


 


 Aspirin


  (Aspirin 81mg


 Chew Tab)  81 mg  DAILY


 PO


   12/13/24 09:00


 1/12/25 08:59  12/17/24 08:17


81 MG


 


 Carvedilol


  (Coreg 3.125MG)  3.125 mg  BID


 PO


   12/12/24 21:00


 1/11/25 20:59  12/17/24 08:17


3.125 MG


 


 Cinacalcet


  (Sensipar 30mg


 Tab)  30 mg  DAILY


 PO


   12/13/24 09:00


 1/12/25 08:59  12/17/24 08:16


30 MG


 


 Doxazosin Mesylate


  (Doxazosin


 Mesylate)  1 mg  HS


 PO


   12/12/24 21:00


 1/11/25 20:59  12/16/24 20:58


1 MG


 


 Gabapentin


  (NEURontin 100


 mg CAP)  100 mg  DAILY


 PO


   12/13/24 09:00


 1/12/25 08:59  12/17/24 08:17


100 MG


 


 Hydralazine HCl


  (APRESOLine 20MG


 INJ)  10 mg  Q6H  PRN


 IV


 SBP GREATER THAN 180  12/11/24 00:30


 1/10/25 00:29   





 


 Hydromorphone HCl


  (DiLAUDid 0.5MG


 INJ)  0.2 mg  Q4H  PRN


 IVP


 SEVERE PAIN (7-10)  12/11/24 00:30


 12/16/24 00:29 DC 12/15/24 05:01


0.2 MG


 


 Labetalol HCl


  (TRANdate 20MG


 SYG)  10 mg  Q2H  PRN


 IV


 SBP GREATER THAN 180  12/11/24 00:30


 1/10/25 00:29   





 


 Lactulose


  (Constulose 20gm/


 30ml Udcup)  20 gm  Q6H  PRN


 PO


 CONSTIPATION  12/11/24 00:30


 1/10/25 00:29  12/15/24 12:05


20 GM


 


 Levothyroxine


 Sodium


  (SYNTHroid 88MCG


 TAB)  88 mcg  SYN


 PO


   12/13/24 06:30


 1/12/25 06:29  12/17/24 05:52


88 MCG


 


 Metronidazole/


 Sodium Chloride


  (flaGYL)  500 mg  Q8H


 IV


   12/16/24 14:00


 12/16/24 14:09 DC  





 


 Ondansetron HCl


  (zoFRAN 4MG INJ)  4 mg  Q6H  PRN


 IVP


 NAUSEA/VOMITING  12/11/24 00:30


 1/10/25 00:29  12/14/24 13:58


4 MG


 


 Pharmacy Profile


 Note


  (Pharmacy


 Communication)  1 each  ONCE


 MISC


   12/11/24 00:30


 12/11/24 00:26 DC  





 


 Piperacillin Sod/


 Tazobactam Sod


  (Zosyn


 3.375gm+NS 50ml)  3.375 gm  Q12H


 IV


   12/11/24 00:30


 12/21/24 00:29  12/17/24 12:49


3.375 GM


 


 Polyethylene


 Glycol/


 Electrolytes


  (Golytely/Colyte


 Soln)  4,000 ml  ONCE


 PO


   12/13/24 17:00


 12/13/24 23:59 DC 12/13/24 17:59


4,000 ML


 


 Simethicone


  (Mylicon)  80 mg  ONCE  PRN


 PO


 GI GAS  12/17/24 04:30


 12/17/24 05:52 DC 12/17/24 05:52


80 MG


 


 Sodium Chloride  1,000 ml @ 


 0 mls/hr  ONCE


 IV


   12/14/24 10:00


 1/13/25 09:59  12/17/24 16:50


1,000 MLS/HR


 


 Vancomycin HCl


  (Vancomycin


 750mg)  750 mg  TTHSPHD


 IVPB


   12/12/24 16:00


 12/16/24 12:25 DC 12/14/24 17:42


750 MG


 


 Vancomycin HCl


  (Vancomycin


 Protocol)  1 each  AD


 IV


   12/11/24 00:30


 12/11/24 00:25 DC  





 


 Vancomycin HCl


  (Vancomycin


 Protocol)  1 each  AD


 IV


   12/11/24 00:30


 12/16/24 12:25 DC  





 


 Vitamin B Complex/


 Vit C/Folic Acid


  (Nephrovite


 Tablet)  1 cap  DAILY


 PO


   12/12/24 09:00


 1/11/25 08:59  12/17/24 08:16


1 CAP


 


 Vitamin B Complex/


 Vit C/Folic Acid


  (Nephrovite


 Tablet)  1 cap  DAILY


 PO


   12/13/24 09:00


 1/12/25 08:59   














Diagnostics / Radiology:


[COPY/PASTE HERE IF NO REPORTS PLEASE DELETE SECTION]





Assessment:  


GERD


Gastric ulcers


Colon polyps


Hemorrhoids


Liver abscess


Abdominal pain





Plan: 


Continue GI prophylaxis


Advance diet as tolerated


Avoid NSAIDs


Antireflux measures


Monitor H&H and transfuse as needed


Call with questions, concerns or change in clinical status


Patient to follow-up at clinic post discharge


Thank you for this consult











ENRIQUETA GARCIA FNAMRY            Dec 17, 2024 19:30

## 2024-12-17 NOTE — DS
[facility] DISCHARGE SUMMARY 





Date Patient Seen: Dec 17, 2024 


Time of Visit: 13:54


Supervising Physician: CARLOS A VERA








Primary Care Physician: [Dr. Delgado Isaacs ]


Outpatient Specialists: [ Dr. Swan, nephro ]]


Inpatient Consults:  General surgery, Infectious Disease, GI, Nephrology





PROBLEM LIST:


Sepsis secondary to 3x3.4 Hepatic Abscess 


   S/P IR drain placement within rt lobe hepatic abscess, culture positive for 

polymicrobial infection   


ESRD HD TTS 


Chronic Congestive Diastolic Heart Failure , Echo 2023 EF of 65% 


Primary hypertension 


Hyperlipidemia


Diabetes mellitus II Hg A1c of 7.2 % from 2019


LA grade B reflux esophagitis w/ no bleeding on EGD 12/14/24


non bleeding gastric ulcers on EGD 12/14/24


2 mm polyp in cecum s/p resection on colonoscopy 12/14/24


3-5mm polyp in descending colon and transverse colon on colonoscopy 12/14/24


none bleeding internal hemorrhoids on colonoscopy 12/14/24





HPI AND HOSPITAL COURSE: Patient is a 74 year old lady with multiple 

comorbidities as mentioned above presented with sepsis secondary to hepatic 

abscess


drain was placed, and started on antibiotic therapy as well, she is awake, 

alert, well oriented, not in distress, no nausea or vomiting reported, and plan 

is to be discharge 


to [facility] with antibiotic therapy, patient will be follow by ID .





CHRONIC PROBLEMS: continue previous management per PCP unless otherwise 

indicated





PROCEDURES:  as mentioned above








DISCHARGE MEDICATIONS: medication list reconciled.














Pt hemodynamically stable and afebrile at time of discharge.  PCP notified of 

patients admission, hospital course and discharge.





PHYSICAL EXAM: 


GENERAL: alert, weak, awake oriented x 3


HEENT: EOMI, Sclera non icteric, moist mucosa 


NECK: Supple, no JVD, trachea midline 


LUNGS: Clear breath sounds bilaterally. No wheezes


HEART: Regular rate and rhythm. Normal S1 and S2, without murmurs 


ABD: Abdomen soft but tender on RUQ. Bowel sounds present,


EXT: No clubbing cyanosis or edema


NEURO: Alert and oriented to person, follows commands





RECOMMENDATIONS:


See Discharge Instructions








 More than 30 minutes spent on discharge process, including evaluation of the 

patient, discussion with nursing staff, medication reconciliation and follow-up 

appointments


ATTESTATION BY PHYSICIAN


Documentation assistance provided by a scribe, information recorded by the 

scribe was done at my direction and has been reviewed and validated by me."











JODY STRAUSS MD








I personally scribed for JODY STRAUSS MD ([email or web address]) on 12/17/24 at 19:59.  

Electronically submitted by Oneyda Collazo ([id number]).





JODY STRAUSS MD                Dec 17, 2024 19:59

## 2024-12-17 NOTE — PN
INFECTIOUS DISEASE  PROGRESS NOTE








Date of Service: Dec 17, 2024





SUBJECTIVE:


This is a 74-year-old female patient with past medical history of diabetes 

mellitus and end-stage renal disease, on dialysis who presented to the hospital 

with chief complaint of generalized body weakness, and right upper abdominal 

quadrant pain.  An MRCP showed fluid collection in the anterior segment of the 

right lobe of the liver consistent with abscess and the reason for this consult.


Patient was seen and examined at bedside in room 330.  


Patient is status post percutaneous drain placement on 12/12/2024.  Hepatic 

fluid aspirate cultures with polymicrobial infection.  Patient continues on 

Zosyn IV.  Patient has been approved to [facility].  Patient is 

pending a PICC line placement today.  Patient is afebrile, temperature is 98.8.

We will continue to monitor patient's care.





PHYSICAL EXAM


EYES:  Anicteric.  Pupils equal and reactive.


HENT: No oral thrush seen, moist Oral mucosa.


NECK:  Supple, no JVD or thyromegaly.


LUNGS:  Good air entry.  No rales, no rhonchi.


CARDIOVASCULAR:  S1, S2 regular.  No murmur heard.


ABDOMEN:  Soft, non tender, bowel sounds present, no organomegaly.  Right 

percutaneous drainage catheter.


CENTRAL NERVOUS SYSTEM:  Awake, alert, oriented x 3.  


SKIN:  No rashes, no swelling.


LYMPHATICS: No peripheral lymphadenopathy.


MUSCULOSKELETAL:  No joint swelling, erythema or tenderness.


EXTREMITIES:  No cyanosis or clubbing.


BACK:  No deformity, no pressure ulcer.


GENITOURINARY:  No dysuria or hematuria.








Vital Sign (Last 12 Hours)








 12/17/24 12/17/24 12/17/24 12/17/24





 11:46 14:05 14:15 14:30


 


Temp 98.8 98.1 98.1 


 


Pulse 68 66 62 64


 


Resp 17 16 16 16


 


B/P (MAP) 129/65 144/56 138/61 140/64


 


Pulse Ox 95   


 


O2 Delivery Nasal Cannula Nasal Cannula Nasal Cannula Nasal Cannula


 


O2 Flow Rate 2.0 2.0 2.0 2.0


 


    





 12/17/24 12/17/24 12/17/24 12/17/24





 14:45 15:00 15:15 15:30


 


Pulse 65 60 63 64


 


Resp 16 16 16 16


 


B/P (MAP) 142/57 127/90 143/62 140/62


 


O2 Delivery Nasal Cannula Nasal Cannula Nasal Cannula Nasal Cannula


 


O2 Flow Rate 2.0 2.0 2.0 2.0





 12/17/24 12/17/24 12/17/24 12/17/24





 15:45 16:00 16:15 16:28


 


Temp    98.1


 


Pulse 68 69 68 65


 


Resp 16 16 16 17


 


B/P (MAP) 140/87 140/66 132/61 140/62


 


Pulse Ox    96


 


O2 Delivery Nasal Cannula Nasal Cannula Nasal Cannula Nasal Cannula


 


O2 Flow Rate 2.0 2.0 2.0 2.0


 


    





 12/17/24 12/17/24 12/17/24 12/17/24





 16:30 16:45 17:00 17:15


 


Temp    97.7


 


Pulse 67 70 69 64


 


Resp 16 16 16 16


 


B/P (MAP) 144/66 125/57 116/51 119/55


 


O2 Delivery Nasal Cannula Nasal Cannula Nasal Cannula Nasal Cannula


 


O2 Flow Rate 2.0 2.0 2.0 2.0


 


    





 12/17/24 12/17/24 12/17/24 12/17/24





 17:30 19:13 20:00 20:45


 


Temp 97.7  98.2 


 


Pulse 70 74 74 


 


Resp 16 18 18 


 


B/P (MAP) 127/62  115/54 115/54


 


Pulse Ox   97 


 


O2 Delivery Nasal Cannula N/Cannula Low lpm Nasal Cannula 


 


O2 Flow Rate 2.0 2.0 1.0 


 


FiO2  28  














Intake & Output (last 24hrs)   


 


 12/16/24 12/16/24 12/17/24





 15:00 23:00 07:00


 


Intake Total 850 ml  400 ml


 


Output Total   15 ml


 


Balance 850 ml  385 ml











LABS:








                                   Laboratory:








Test


 12/17/24


19:38 12/17/24


04:40 Range/Units


 


 


Whole Blood Glucose 180 #H    MG/DL


 


White Blood Count  9.1  4.8-10.8  K/uL


 


Red Blood Count


 


 2.86 L


 4.00-5.50


MIL/uL


 


Hemoglobin  9.9 L 12.0-16.0  g/dL


 


Hematocrit  29.0 L 36-48  %


 


Mean Corpuscular Volume  101.4 H 79-99  fL


 


Mean Corpuscular Hemoglobin  34.6 H 27.0-33.0  pg


 


Mean Corpuscular Hemoglobin


Concent 


 34.1 


 32.0-36.0  g/dL





 


Red Cell Distribution Width  12.0  11.0-15.5  %


 


Platelet Count  220  130-400  K/uL


 


Mean Platelet Volume  9.8  7.5-10.5  fL


 


Immature Granulocyte % (Auto)  4.1 H 0-1  %


 


Neutrophils (%) (Auto)  55.2  40.0-77.0  %


 


Lymphocytes (%) (Auto)  27.0  21.0-51.0  %


 


Monocytes (%) (Auto)  9.3  3.0-13.0  %


 


Eosinophils (%) (Auto)  3.7  0.0-8.0  %


 


Basophils (%) (Auto)  0.7  0.0-5.0  %


 


Neutrophils # (Auto)  5.0  1.8-7.7  K/uL


 


Lymphocytes # (Auto)  2.5  1.0-4.8  K/uL


 


Monocytes # (Auto)  0.8  0.1-1.0  K/uL


 


Eosinophils # (Auto)  0.34  0.00-0.70  K/uL


 


Basophils # (Auto)  0.06  0.00-0.20  K/uL


 


Absolute Immature Granulocyte


(auto 


 0.37 


 0-1  K/uL





 


Segmented Neutrophils %  56  40-70  %


 


Lymphocytes % (Manual)  34  22-44  %


 


Monocytes % (Manual)  8  2-9  %


 


Eosinophils % (Manual)  2  1-6  %


 


Nucleated Red Blood Cells  0.0  0.0-0.19  %


 


Differential Comment


 


 MANUAL


DIFFERENTIAL  





 


White Cell Morphology Comment  NORMAL   


 


Platelet Morphology Comment  ADEQUATE   


 


Red Blood Cell Morphology  ANISO 2+   


 


Sodium Level  137  136-145  mmol/L


 


Potassium Level  3.5  3.5-5.1  mmol/L


 


Chloride Level  98 L 101-111  mmol/L


 


Carbon Dioxide Level  28  21-32  mmol/L


 


Blood Urea Nitrogen  59 H 7-18  mg/dL


 


Creatinine  6.8 H 0.5-1.0  mg/dL


 


Glomerular Filtration Rate


Calc 


 6 


 >90  mL/min





 


Random Glucose  93    mg/dL


 


Total Calcium  7.4 L 8.5-10.1  mg/dL


 


Phosphorus Level  7.8 H 2.5-4.9  mg/dL


 


Vancomycin Level


 


 21.7 


 20.0-30.0


mcg/mL














ASSESSMENT:


Liver abscess status post percutaneous drain placement on 12/12/2024.  


Hepatic fluid aspirate Polymicrobial infection with Enterococcus Raffinosus, E 

coli and Klebsiella oxytoca.


Leukocytosis, resolving.


End-stage renal disease, on dialysis.  


Diabetes mellitus.  


Anemia.  





PLAN:


Continue Zosyn IV.


Continue pain management.


Monitoring drain output.


Monitor electrolytes.


Continue dialysis as recommended by nephrologist.


Patient was referred to [facility] and has been approved.


Patient is pending a PICC line.





This case was reviewed and discussed with my supervising physician and the above

assessment and plan was formulated and agreed upon.


ATTESTATION BY PHYSICIAN


I have seen and examined the patient. I reviewed the documentation, medical 

decision making, and treatment plan as noted by the mid-level provider above. I 

agree with the findings and plan of care.











DULCE MARIA GARZA MD, MIRTA L [facility]             Dec 17, 2024 21:11

## 2024-12-18 VITALS
RESPIRATION RATE: 16 BRPM | TEMPERATURE: 98 F | DIASTOLIC BLOOD PRESSURE: 59 MMHG | HEART RATE: 57 BPM | SYSTOLIC BLOOD PRESSURE: 126 MMHG

## 2024-12-18 VITALS
TEMPERATURE: 98 F | HEART RATE: 65 BPM | SYSTOLIC BLOOD PRESSURE: 150 MMHG | DIASTOLIC BLOOD PRESSURE: 75 MMHG | RESPIRATION RATE: 16 BRPM

## 2024-12-18 VITALS
HEART RATE: 61 BPM | SYSTOLIC BLOOD PRESSURE: 154 MMHG | RESPIRATION RATE: 18 BRPM | DIASTOLIC BLOOD PRESSURE: 71 MMHG | TEMPERATURE: 98.1 F

## 2024-12-18 VITALS — RESPIRATION RATE: 18 BRPM | HEART RATE: 67 BPM | OXYGEN SATURATION: 94 %

## 2024-12-18 VITALS
RESPIRATION RATE: 18 BRPM | DIASTOLIC BLOOD PRESSURE: 54 MMHG | HEART RATE: 71 BPM | SYSTOLIC BLOOD PRESSURE: 135 MMHG | TEMPERATURE: 98.5 F

## 2024-12-18 VITALS
HEART RATE: 66 BPM | SYSTOLIC BLOOD PRESSURE: 134 MMHG | DIASTOLIC BLOOD PRESSURE: 56 MMHG | TEMPERATURE: 98.2 F | RESPIRATION RATE: 18 BRPM

## 2024-12-18 VITALS — OXYGEN SATURATION: 99 %

## 2024-12-18 LAB
APTT PPP: 29.1 SEC (ref 26.3–35.5)
INR PPP: 1.02 (ref 0.85–1.15)
PROTHROMBIN TIME: 11.4 SEC (ref 9.6–11.6)

## 2024-12-18 PROCEDURE — 02HV33Z INSERTION OF INFUSION DEVICE INTO SUPERIOR VENA CAVA, PERCUTANEOUS APPROACH: ICD-10-PCS | Performed by: INTERNAL MEDICINE

## 2024-12-18 NOTE — NUR
PICC LINE PLACEMENT



5FR 2 lumen PICC line placed on right upper arm basilic vein with ultrasound guidance under 
sterile technique. Patient tolerated procedure well. Catheter trimmed to 45 cm. Internal 
length 42 cm, External length 3 cm. Arm circumference 29 cm. Blood returned obtained from 
both lumen. (+) VPS [place] and Chest Xray obtained for placement verification. Please 
report chest xray results to MD for ok to use catheter order.



PICC LINE Care



Perform hand hygiene, don gloves, scrub each hub with alcohol prep pad for 15 seconds each 
before every access.

Flush each port with 10ml NS flush, if line is not in use, and clamp after every access.

Change PICC dressing every 7 days and/or PRN if soiled or peeling off using sterile 
technique.

## 2024-12-18 NOTE — DS
[facility] DISCHARGE SUMMARY 





Date Patient Seen: Dec 18, 2024 


Time of Visit: 14:28


Supervising Physician: [ ]








Primary Care Physician: [Dr. Delgado Isaacs ]


Outpatient Specialists: [ Dr. Swan, nephro ]]


Inpatient Consults:  General surgery, Infectious Disease, GI, Nephrology





DISCHARGE DIAGNOSES


Sepsis secondary to 3x3.4 Hepatic Abscess 


   S/P IR drain placement within rt lobe hepatic abscess, culture positive for 

polymicrobial infection   


ESRD HD TTS 


Chronic Congestive Diastolic Heart Failure , Echo 2023 EF of 65% 


Primary hypertension 


Hyperlipidemia


Diabetes mellitus II Hg A1c of 7.2 % from 2019


LA grade B reflux esophagitis w/ no bleeding on EGD 12/14/24


non bleeding gastric ulcers on EGD 12/14/24


2 mm polyp in cecum s/p resection on colonoscopy 12/14/24


3-5mm polyp in descending colon and transverse colon on colonoscopy 12/14/24


none bleeding internal hemorrhoids on colonoscopy 12/14/24





HPI AND HOSPITAL COURSE: Patient seen and examined at the bedside, she is awake,

alert , well oriented, not in distress doing much better


and she is going to be discharge on antibiotic therapy, denies fever, chills, 

nausea or vomiting


no new events over the last 24 hours , plan is to be discharge to [facility] 








PROCEDURES:  as mentioned above








DISCHARGE MEDICATIONS:














Pt hemodynamically stable and afebrile at time of discharge.  PCP notified of 

patients admission, hospital course and discharge.





PHYSICAL EXAM: 


GENERAL: alert, weak, awake oriented x 3


HEENT: EOMI, Sclera non icteric, moist mucosa 


NECK: Supple, no JVD, trachea midline 


LUNGS: Clear breath sounds bilaterally. No wheezes


HEART: Regular rate and rhythm. Normal S1 and S2, without murmurs 


ABD: Abdomen soft but tender on RUQ. Bowel sounds present,


EXT: No clubbing cyanosis or edema


NEURO: Alert and oriented to person, follows commands





FOLLOW-UP:








RECOMMENDATIONS:


See Discharge Instructions





 More than 30 minutes spent on discharge process, including evaluation of the 

patient, discussion with nursing staff, medication reconciliation and follow-up 

appointments


ATTESTATION BY PHYSICIAN


Documentation assistance provided by a scribe, information recorded by the 

scribe was done at my direction and has been reviewed and validated by me."








MYA TOUSSAINT MD








I personally scribed for NORBERT ROQUE MD ([email or web address]) on 12/18/24 at 23:31.  

Electronically submitted by Oneyda Collazo ([id number]).





NORBERT ROQUE MD           Dec 18, 2024 23:31

## 2024-12-18 NOTE — NUR
PATIENT DISCHARGED TO SNF

ID BAND AND IV REMOVED. DISCHARGE INSTRUCTIONS EXPLAINED AND GIVEN TO PATIENT. PATIENT 
VERBALIZED UNDERSTANDING. BELONGINGS PACKED AND TAKEN BY PATIENT. TRANSFERRED VIA WHEELCHAIR 
TO FACILITY [facility].

## 2024-12-18 NOTE — HMCIMG
CHEST 1VW



HISTORY: PICC line placement



COMPARISON: 12/10/2024



FINDINGS: A frontal projection of the chest was obtained. Mild bilateral

pulmonary infiltrates are seen may be related to mild pulmonary vascular

congestion with possible superimposed pneumonitis.  The heart is

borderline enlarged.  PICC line is seen entering from the right with

distal tip in the plane of the superior vena cava.  No evidence of

aortic calcification is seen.



IMPRESSION:

1. Mild bilateral pulmonary infiltrates are seen may be related to mild

pulmonary vascular congestion with possible superimposed pneumonitis.

## 2024-12-18 NOTE — PN
GASTROENTEROLOGY PROGRESS NOTE








Date of Visit: Dec 18, 2024 


Time of Visit: 16:27





Events / Notes:


No acute events overnight.  Patient underwent IR drainage of liver abscess. EGD 

revealing LA grade B reflux esophagitis with nonbleeding gastric ulcers and 

colonoscopy revealing colon polyp status post polypectomy.  Nonbleeding internal

hemorrhoids.





Review of Systems:


CONSTITUTIONAL: No malaise or change in sensation of wellbeing.


ENMT: No rhinorrhea, otorrhea, sinus pain, ear ache.


CARDIOVASCULAR: No angina, palpitations, orthopnea or paroxysmal dyspnea.


RESPIRATORY: No SOB.


GASTROINTESTINAL: No abdominal pain, nausea, vomiting, diarrhea, hematemesis, 

melena or change in the patient's habitual bowel movements consistency/number.


GENITOURINARY: No dysuria, hematuria or change in bladder continence.


MUSCULOSKELETAL: No new muscle pain or decrease in muscular strength. No new 

joint swelling, redness or tenderness.


SKIN: No new rash.





Physical Exam:


GEN: Awake, alert, oriented in person, time and place, and in no acute distress.


HEENT: No sinus tenderness. Tympanic membranes were not examined. No rhinorrhea.

Oral pharyngeal mucosa is pink, moist and within normal limits. Neck is supple 

with no cervical lymphadenopathy, thyromegaly or JVD.


CHEST: Inspection, palpation and percussion of the chest were unremarkable. Lung

auscultation revealed normal breath sounds bilaterally.


CARDIAC: PMI is within normal limits. Heart sounds are regular. Normal S1, S2. 

No gallop or murmur.


ABD: Soft, non-tender and not distended. No peritoneal signs on palpation. No 

organomegaly. Normal bowel sounds.


EXT: No cyanosis or clubbing. No edema.


SKIN: Intact. No rashes.


JOINTS: No evidence of synovitis or acute arthritis.


NEURO: Alert and oriented to name, place and person. Cranial nerve examination 

is unremarkable. No focal motor deficits. Normal speech. Gait is normal. 

Strength is normal.





                             Vital Signs (last 8hr)








  Date Time  Temp Pulse Resp B/P (MAP) Pulse Ox O2 Delivery O2 Flow Rate FiO2


 


12/18/24 11:25 98.1 57 16 126/59 99 Nasal Cannula 2.0 24


 


12/18/24 09:23    150/75    


 


12/18/24 08:33 98.1 65 16 150/75 98 Nasal Cannula 2.0 24











Laboratory:


[ ]








                                   Laboratory:








Test


 12/18/24


11:08 12/18/24


08:50 12/17/24


04:40 Range/Units


 


 


Whole Blood Glucose 101      MG/DL


 


Prothrombin Time  11.4   9.6-11.6  SEC


 


Prothromb Time International


Ratio 


 1.02 


 


 0.85-1.15  





 


Activated Partial


Thromboplast Time 


 29.1 


 


 26.3-35.5  SEC





 


White Blood Count   9.1  4.8-10.8  K/uL


 


Red Blood Count


 


 


 2.86 L


 4.00-5.50


MIL/uL


 


Hemoglobin   9.9 L 12.0-16.0  g/dL


 


Hematocrit   29.0 L 36-48  %


 


Mean Corpuscular Volume   101.4 H 79-99  fL


 


Mean Corpuscular Hemoglobin   34.6 H 27.0-33.0  pg


 


Mean Corpuscular Hemoglobin


Concent 


 


 34.1 


 32.0-36.0  g/dL





 


Red Cell Distribution Width   12.0  11.0-15.5  %


 


Platelet Count   220  130-400  K/uL


 


Mean Platelet Volume   9.8  7.5-10.5  fL


 


Immature Granulocyte % (Auto)   4.1 H 0-1  %


 


Neutrophils (%) (Auto)   55.2  40.0-77.0  %


 


Lymphocytes (%) (Auto)   27.0  21.0-51.0  %


 


Monocytes (%) (Auto)   9.3  3.0-13.0  %


 


Eosinophils (%) (Auto)   3.7  0.0-8.0  %


 


Basophils (%) (Auto)   0.7  0.0-5.0  %


 


Neutrophils # (Auto)   5.0  1.8-7.7  K/uL


 


Lymphocytes # (Auto)   2.5  1.0-4.8  K/uL


 


Monocytes # (Auto)   0.8  0.1-1.0  K/uL


 


Eosinophils # (Auto)   0.34  0.00-0.70  K/uL


 


Basophils # (Auto)   0.06  0.00-0.20  K/uL


 


Absolute Immature Granulocyte


(auto 


 


 0.37 


 0-1  K/uL





 


Segmented Neutrophils %   56  40-70  %


 


Lymphocytes % (Manual)   34  22-44  %


 


Monocytes % (Manual)   8  2-9  %


 


Eosinophils % (Manual)   2  1-6  %


 


Nucleated Red Blood Cells   0.0  0.0-0.19  %


 


Differential Comment


 


 


 MANUAL


DIFFERENTIAL  





 


White Cell Morphology Comment   NORMAL   


 


Platelet Morphology Comment   ADEQUATE   


 


Red Blood Cell Morphology   ANISO 2+   


 


Sodium Level   137  136-145  mmol/L


 


Potassium Level   3.5  3.5-5.1  mmol/L


 


Chloride Level   98 L 101-111  mmol/L


 


Carbon Dioxide Level   28  21-32  mmol/L


 


Blood Urea Nitrogen   59 H 7-18  mg/dL


 


Creatinine   6.8 H 0.5-1.0  mg/dL


 


Glomerular Filtration Rate


Calc 


 


 6 


 >90  mL/min





 


Random Glucose   93    mg/dL


 


Total Calcium   7.4 L 8.5-10.1  mg/dL


 


Phosphorus Level   7.8 H 2.5-4.9  mg/dL


 


Vancomycin Level


 


 


 21.7 


 20.0-30.0


mcg/mL











                               Current Medications








 Medications


  (Trade)  Dose


 Ordered  Sig/Adenike


 Route


 PRN Reason  Start Time


 Stop Time Status Last Admin


Dose Admin


 


 Acetaminophen


  (TYLenol 325MG


 TAB)  650 mg  Q6H  PRN


 PO


 FEVER/MILD PAIN LEVEL 1-3  12/11/24 00:30


 1/10/25 00:29  12/17/24 20:45


650 MG


 


 Albuterol Sulfate


  (Proventil


 0.083% 2.5mg/3ml)  2.5 mg  [id number]  PRN


 IH


 SHORTNESS OF BREATH  12/11/24 00:30


 1/10/25 00:29   





 


 Amlodipine


 Besylate


  (NorvASC 5MG TAB)  10 mg  DAILY


 PO


   12/13/24 09:00


 1/12/25 08:59  12/18/24 09:24


10 MG


 


 Aspirin


  (Aspirin 81mg


 Chew Tab)  81 mg  DAILY


 PO


   12/13/24 09:00


 1/12/25 08:59  12/18/24 09:22


81 MG


 


 Carvedilol


  (Coreg 3.125MG)  3.125 mg  BID


 PO


   12/12/24 21:00


 1/11/25 20:59  12/18/24 09:23


3.125 MG


 


 Cinacalcet


  (Sensipar 30mg


 Tab)  30 mg  DAILY


 PO


   12/13/24 09:00


 1/12/25 08:59  12/18/24 09:24


30 MG


 


 Doxazosin Mesylate


  (Doxazosin


 Mesylate)  1 mg  HS


 PO


   12/12/24 21:00


 1/11/25 20:59  12/17/24 20:43


1 MG


 


 Gabapentin


  (NEURontin 100


 mg CAP)  100 mg  DAILY


 PO


   12/13/24 09:00


 1/12/25 08:59  12/18/24 09:22


100 MG


 


 Hydralazine HCl


  (APRESOLine 20MG


 INJ)  10 mg  Q6H  PRN


 IV


 SBP GREATER THAN 180  12/11/24 00:30


 1/10/25 00:29   





 


 Hydromorphone HCl


  (DiLAUDid 0.5MG


 INJ)  0.2 mg  Q4H  PRN


 IVP


 SEVERE PAIN (7-10)  12/11/24 00:30


 12/16/24 00:29 DC 12/15/24 05:01


0.2 MG


 


 Labetalol HCl


  (TRANdate 20MG


 SYG)  10 mg  Q2H  PRN


 IV


 SBP GREATER THAN 180  12/11/24 00:30


 12/18/24 09:29 DC  





 


 Lactulose


  (Constulose 20gm/


 30ml Udcup)  20 gm  Q6H  PRN


 PO


 CONSTIPATION  12/11/24 00:30


 1/10/25 00:29  12/15/24 12:05


20 GM


 


 Levothyroxine


 Sodium


  (SYNTHroid 88MCG


 TAB)  88 mcg  SYN


 PO


   12/13/24 06:30


 1/12/25 06:29  12/18/24 06:50


88 MCG


 


 Metronidazole/


 Sodium Chloride


  (flaGYL)  500 mg  Q8H


 IV


   12/16/24 14:00


 12/16/24 14:09 DC  





 


 Ondansetron HCl


  (zoFRAN 4MG INJ)  4 mg  Q6H  PRN


 IVP


 NAUSEA/VOMITING  12/11/24 00:30


 1/10/25 00:29  12/14/24 13:58


4 MG


 


 Pharmacy Profile


 Note


  (Pharmacy


 Communication)  1 each  ONCE


 MISC


   12/11/24 00:30


 12/11/24 00:26 DC  





 


 Piperacillin Sod/


 Tazobactam Sod


  (Zosyn


 3.375gm+NS 50ml)  3.375 gm  Q12H


 IV


   12/11/24 00:30


 12/21/24 00:29  12/18/24 13:24


3.375 GM


 


 Polyethylene


 Glycol/


 Electrolytes


  (Golytely/Colyte


 Soln)  4,000 ml  ONCE


 PO


   12/13/24 17:00


 12/13/24 23:59 DC 12/13/24 17:59


4,000 ML


 


 Simethicone


  (Mylicon)  80 mg  ONCE  PRN


 PO


 GI GAS  12/17/24 04:30


 12/17/24 05:52 DC 12/17/24 05:52


80 MG


 


 Sodium Chloride  1,000 ml @ 


 0 mls/hr  ONCE


 IV


   12/14/24 10:00


 1/13/25 09:59  12/17/24 16:50


1,000 MLS/HR


 


 Vancomycin HCl


  (Vancomycin


 750mg)  750 mg  [id number]


 IVPB


   12/12/24 16:00


 12/16/24 12:25 DC 12/14/24 17:42


750 MG


 


 Vancomycin HCl


  (Vancomycin


 Protocol)  1 each  AD


 IV


   12/11/24 00:30


 12/11/24 00:25 DC  





 


 Vancomycin HCl


  (Vancomycin


 Protocol)  1 each  AD


 IV


   12/11/24 00:30


 12/16/24 12:25 DC  





 


 Vitamin B Complex/


 Vit C/Folic Acid


  (Nephrovite


 Tablet)  1 cap  DAILY


 PO


   12/12/24 09:00


 12/18/24 09:29 DC 12/18/24 09:23


1 CAP


 


 Vitamin B Complex/


 Vit C/Folic Acid


  (Nephrovite


 Tablet)  1 cap  DAILY


 PO


   12/13/24 09:00


 1/12/25 08:59   














Diagnostics / Radiology:


[COPY/PASTE HERE IF NO REPORTS PLEASE DELETE SECTION]





Assessment:  


GERD


Gastric ulcers


Colon polyps


Hemorrhoids


Liver abscess


Abdominal pain





Plan: 


Continue GI prophylaxis


Advance diet as tolerated


Avoid NSAIDs


Antireflux measures


Monitor H&H and transfuse as needed


Call with questions, concerns or change in clinical status


Patient to follow-up at clinic post discharge


Thank you for this consult











ENRIQUETA GARCIA            Dec 18, 2024 16:27

## 2024-12-18 NOTE — PN
INFECTIOUS DISEASE  PROGRESS NOTE








Date of Service: Dec 18, 2024





SUBJECTIVE:


This is a 74-year-old female patient with past medical history of diabetes 

mellitus and end-stage renal disease, on dialysis who presented to the hospital 

with chief complaint of generalized body weakness, and right upper abdominal 

quadrant pain.  An MRCP showed fluid collection in the anterior segment of the 

right lobe of the liver consistent with abscess and the reason for this consult.


Patient was seen and examined at bedside in room 330.  


Patient is status post percutaneous drain placement on 12/12/2024.  No fever 

reported this morning, temperature is 98.1.  Patient continues on Zosyn IV.  

Patient has been approved to [facility]. A PICC line is being placed 

today will be discharged today.  Patient will need a an abscessogram in one or 

two weeks to follow up on the abscess and possible removal of drain placement.





PHYSICAL EXAM


EYES:  Anicteric.  Pupils equal and reactive.


HENT: No oral thrush seen, moist Oral mucosa.


NECK:  Supple, no JVD or thyromegaly.


LUNGS:  Good air entry.  No rales, no rhonchi.


CARDIOVASCULAR:  S1, S2 regular.  No murmur heard.


ABDOMEN:  Soft, non tender, bowel sounds present, no organomegaly.  Right 

percutaneous drainage catheter.


CENTRAL NERVOUS SYSTEM:  Awake, alert, oriented x 3.  


SKIN:  No rashes, no swelling.


LYMPHATICS: No peripheral lymphadenopathy.


MUSCULOSKELETAL:  No joint swelling, erythema or tenderness.


EXTREMITIES:  No cyanosis or clubbing.


BACK:  No deformity, no pressure ulcer.


GENITOURINARY:  No dysuria or hematuria.








Vital Sign (Last 12 Hours)








 12/18/24 12/18/24 12/18/24 12/18/24





 06:50 08:00 08:33 09:23


 


Temp   98.1 


 


Pulse 67  65 


 


Resp 18  16 


 


B/P (MAP)   150/75 150/75


 


Pulse Ox  99 98 


 


O2 Delivery N/Cannula Low lpm Nasal Cannula* Nasal Cannula 


 


O2 Flow Rate 2.0 2 2.0 


 


FiO2 28 28 24 12/18/24 12/18/24  





 11:25 16:45  


 


Temp 98.1 98.1  


 


Pulse 57 61  


 


Resp 16 18  


 


B/P (MAP) 126/59 154/71  


 


Pulse Ox 99 96  


 


O2 Delivery Nasal Cannula Nasal Cannula  


 


O2 Flow Rate 2.0 2.0  


 


FiO2 24 24  














Intake & Output (last 24hrs)   


 


 12/17/24 12/17/24 12/18/24





 15:00 23:00 07:00


 


Intake Total  1000 ml 


 


Output Total  3600 ml 


 


Balance  -2600 ml 











LABS:








                                   Laboratory:








Test


 12/18/24


16:32 12/18/24


08:50 12/17/24


04:40 Range/Units


 


 


Whole Blood Glucose 100      MG/DL


 


Prothrombin Time  11.4   9.6-11.6  SEC


 


Prothromb Time International


Ratio 


 1.02 


 


 0.85-1.15  





 


Activated Partial


Thromboplast Time 


 29.1 


 


 26.3-35.5  SEC





 


White Blood Count   9.1  4.8-10.8  K/uL


 


Red Blood Count


 


 


 2.86 L


 4.00-5.50


MIL/uL


 


Hemoglobin   9.9 L 12.0-16.0  g/dL


 


Hematocrit   29.0 L 36-48  %


 


Mean Corpuscular Volume   101.4 H 79-99  fL


 


Mean Corpuscular Hemoglobin   34.6 H 27.0-33.0  pg


 


Mean Corpuscular Hemoglobin


Concent 


 


 34.1 


 32.0-36.0  g/dL





 


Red Cell Distribution Width   12.0  11.0-15.5  %


 


Platelet Count   220  130-400  K/uL


 


Mean Platelet Volume   9.8  7.5-10.5  fL


 


Immature Granulocyte % (Auto)   4.1 H 0-1  %


 


Neutrophils (%) (Auto)   55.2  40.0-77.0  %


 


Lymphocytes (%) (Auto)   27.0  21.0-51.0  %


 


Monocytes (%) (Auto)   9.3  3.0-13.0  %


 


Eosinophils (%) (Auto)   3.7  0.0-8.0  %


 


Basophils (%) (Auto)   0.7  0.0-5.0  %


 


Neutrophils # (Auto)   5.0  1.8-7.7  K/uL


 


Lymphocytes # (Auto)   2.5  1.0-4.8  K/uL


 


Monocytes # (Auto)   0.8  0.1-1.0  K/uL


 


Eosinophils # (Auto)   0.34  0.00-0.70  K/uL


 


Basophils # (Auto)   0.06  0.00-0.20  K/uL


 


Absolute Immature Granulocyte


(auto 


 


 0.37 


 0-1  K/uL





 


Segmented Neutrophils %   56  40-70  %


 


Lymphocytes % (Manual)   34  22-44  %


 


Monocytes % (Manual)   8  2-9  %


 


Eosinophils % (Manual)   2  1-6  %


 


Nucleated Red Blood Cells   0.0  0.0-0.19  %


 


Differential Comment


 


 


 MANUAL


DIFFERENTIAL  





 


White Cell Morphology Comment   NORMAL   


 


Platelet Morphology Comment   ADEQUATE   


 


Red Blood Cell Morphology   ANISO 2+   


 


Sodium Level   137  136-145  mmol/L


 


Potassium Level   3.5  3.5-5.1  mmol/L


 


Chloride Level   98 L 101-111  mmol/L


 


Carbon Dioxide Level   28  21-32  mmol/L


 


Blood Urea Nitrogen   59 H 7-18  mg/dL


 


Creatinine   6.8 H 0.5-1.0  mg/dL


 


Glomerular Filtration Rate


Calc 


 


 6 


 >90  mL/min





 


Random Glucose   93    mg/dL


 


Total Calcium   7.4 L 8.5-10.1  mg/dL


 


Phosphorus Level   7.8 H 2.5-4.9  mg/dL


 


Vancomycin Level


 


 


 21.7 


 20.0-30.0


mcg/mL














ASSESSMENT:


Liver abscess status post percutaneous drain placement on 12/12/2024.  


Hepatic fluid aspirate Polymicrobial infection with Enterococcus Raffinosus, E 

coli and Klebsiella oxytoca.


Leukocytosis, resolving.


End-stage renal disease, on dialysis.  


Diabetes mellitus.  


Anemia.  





PLAN:


Continue Zosyn IV.


PICC line has been placed.


Patient has been approved to [facility] and will be discharged today.


 Patient will need a an abscessogram in 1 or 2 weeks to follow up on the abscess

and possible removal of drain placement.





This case was reviewed and discussed with my supervising physician and the above

assessment and plan was formulated and agreed upon.


ATTESTATION BY PHYSICIAN


I have seen and examined the patient. I reviewed the documentation, medical 

decision making, and treatment plan as noted by the mid-level provider above. I 

agree with the findings and plan of care.











DULCE MARIA GARZA MD, [facility]             Dec 18, 2024 17:53

## 2024-12-18 NOTE — PN
NEPHROLOGY PROGRESS NOTE








Date/Time Patient Seen: Dec 18, 2024





SUBJECTIVE:


The patient is a 74-year-old female with a history of diabetes, end stage renal 

disease on hemodialysis Tuesday Thursday Saturday, recent cholecystectomy 


She presents to the emergency department with complaints of generalized body 

weakness, bilateral flank pain, nausea, right upper abdominal pain onset two 

days ago.  


Blood cultures from outpatient dialysis clinic positive for E. coli 


Blood cultures collected on 12/10/2024 are negative


Body fluid cultures are positive for Enterococcus raffinosus, E coli, Klebsiella

oxytoca


She continues on antibiotics as per ID


S/P percutaneous drain placed for hepatic abscess on 12/12


Case management coordinating SNF placement


Pending PICC line placement 


She tolerated this without difficulty.


Dialysis planned for tomorrow


She was seen in the medical floor, no acute distress





REVIEW OF SYSTEMS:


GENERAL: Negative for any nausea, vomiting, fevers, chills, or weight loss.


NEUROLOGIC: Negative for any blurry vision, blind spots, double vision, facial 

asymmetry, dysphagia, dysarthria, hemiparesis, hemisensory deficits, vertigo, 

ataxia.


HEENT: Negative for any head trauma, neck trauma, neck stiffness, photophobia, 

phonophobia, sinusitis, rhinitis.


CARDIAC: Negative for any chest pain, dyspnea on exertion, paroxysmal nocturnal 

dyspnea, peripheral edema.


PULMONARY: Negative for any shortness of breath, wheezing, COPD, or TB exposure.


GASTROINTESTINAL: Negative for any abdominal pain, nausea, vomiting, bright red 

blood per rectum, melena.


GENITOURINARY: Negative for any dysuria, hematuria, incontinence.


INTEGUMENTARY: Negative for any rashes, cuts, insect bites.


RHEUMATOLOGIC: Negative for any joint pains, photosensitive rashes, history of 

vasculitis or kidney problems.


HEMATOLOGIC: Negative for any abnormal bruising, frequent infections or 

bleeding.





                             Vital Signs (last 8hr)








  Date Time  Temp Pulse Resp B/P (MAP) Pulse Ox O2 Delivery O2 Flow Rate FiO2


 


12/13/24 12:00 98.8 63 19 153/55 94 Nasal Cannula 3.0 


 


12/13/24 08:00 98.8 77 20 159/47 91 Room Air  








PHYSICAL EXAM:


GENERAL: Alert and oriented x 3. No acute distress. Well-nourished.


EYES: EOMI. Anicteric.


HENT: Moist mucous membranes. No scleral icterus. No cervical lymphadenopathy.


LUNGS: Clear to auscultation bilaterally. No accessory muscle use.


CARDIOVASCULAR: Regular rate and rhythm. No murmur. No JVD.


ABDOMEN: Soft, non-tender and non-distended. No palpable masses.


EXTREMITIES: No edema. Non-tender.?SKIN: No rashes or lesions. Warm.


NEUROLOGIC: No focal neurological deficits. CN II-XII grossly intact, but not 

individually tested.


PSYCHIATRIC: Cooperative. Appropriate mood and affect.








                               Current Medications








 Medications


  (Trade)  Dose


 Ordered  Sig/Adenike


 Route


 PRN Reason  Start Time


 Stop Time Status Last Admin


Dose Admin


 


 Acetaminophen


  (TYLenol 325MG


 TAB)  650 mg  Q6H  PRN


 PO


 FEVER/MILD PAIN LEVEL 1-3  12/11/24 00:30


 1/10/25 00:29  12/12/24 09:12


650 MG


 


 Albuterol Sulfate


  (Proventil


 0.083% 2.5mg/3ml)  2.5 mg  [id number]  PRN


 IH


 SHORTNESS OF BREATH  12/11/24 00:30


 1/10/25 00:29   





 


 Amlodipine


 Besylate


  (NorvASC 5MG TAB)  10 mg  DAILY


 PO


   12/13/24 09:00


 1/12/25 08:59   





 


 Aspirin


  (Aspirin 81mg


 Chew Tab)  81 mg  DAILY


 PO


   12/13/24 09:00


 1/12/25 08:59   





 


 Carvedilol


  (Coreg 3.125MG)  3.125 mg  BID


 PO


   12/12/24 21:00


 1/11/25 20:59  12/12/24 21:23


3.125 MG


 


 Cinacalcet


  (Sensipar 30mg


 Tab)  30 mg  DAILY


 PO


   12/13/24 09:00


 1/12/25 08:59   





 


 Doxazosin Mesylate


  (Doxazosin


 Mesylate)  1 mg  HS


 PO


   12/12/24 21:00


 1/11/25 20:59  12/12/24 21:22


1 MG


 


 Gabapentin


  (NEURontin 100


 mg CAP)  100 mg  DAILY


 PO


   12/13/24 09:00


 1/12/25 08:59   





 


 Hydralazine HCl


  (APRESOLine 20MG


 INJ)  10 mg  Q6H  PRN


 IV


 SBP GREATER THAN 180  12/11/24 00:30


 1/10/25 00:29   





 


 Hydromorphone HCl


  (DiLAUDid 0.5MG


 INJ)  0.2 mg  Q4H  PRN


 IVP


 SEVERE PAIN (7-10)  12/11/24 00:30


 12/16/24 00:29  12/12/24 00:53


0.2 MG


 


 Labetalol HCl


  (TRANdate 20MG


 SYG)  10 mg  Q2H  PRN


 IV


 SBP GREATER THAN 180  12/11/24 00:30


 1/10/25 00:29   





 


 Lactulose


  (Constulose 20gm/


 30ml Udcup)  20 gm  Q6H  PRN


 PO


 CONSTIPATION  12/11/24 00:30


 1/10/25 00:29   





 


 Levothyroxine


 Sodium


  (SYNTHroid 88MCG


 TAB)  88 mcg  SYN


 PO


   12/13/24 06:30


 1/12/25 06:29   





 


 Ondansetron HCl


  (zoFRAN 4MG INJ)  4 mg  Q6H  PRN


 IVP


 NAUSEA/VOMITING  12/11/24 00:30


 1/10/25 00:29   





 


 Pharmacy Profile


 Note


  (Pharmacy


 Communication)  1 each  ONCE


 MISC


   12/11/24 00:30


 12/11/24 00:26 DC  





 


 Piperacillin Sod/


 Tazobactam Sod


  (Zosyn


 3.375gm+NS 50ml)  3.375 gm  Q12H


 IV


   12/11/24 00:30


 12/21/24 00:29  12/13/24 13:33


3.375 GM


 


 Vancomycin HCl


  (Vancomycin


 750mg)  750 mg  TTHSPHD


 IVPB


   12/12/24 16:00


 12/22/24 15:59  12/12/24 17:25


750 MG


 


 Vancomycin HCl


  (Vancomycin


 Protocol)  1 each  AD


 IV


   12/11/24 00:30


 12/11/24 00:25 DC  





 


 Vancomycin HCl


  (Vancomycin


 Protocol)  1 each  AD


 IV


   12/11/24 00:30


 12/25/24 00:29   





 


 Vitamin B Complex/


 Vit C/Folic Acid


  (Nephrovite


 Tablet)  1 cap  DAILY


 PO


   12/12/24 09:00


 1/11/25 08:59  12/12/24 09:11


1 CAP


 


 Vitamin B Complex/


 Vit C/Folic Acid


  (Nephrovite


 Tablet)  1 cap  DAILY


 PO


   12/13/24 09:00


 1/12/25 08:59   














LABORATORY:


[ ]








                                Hematology Labs:








Test


 12/17/24


04:40 Range/Units


 


 


White Blood Count 9.1  4.8-10.8  K/uL


 


Red Blood Count


 2.86 L


 4.00-5.50


MIL/uL


 


Hemoglobin 9.9 L 12.0-16.0  g/dL


 


Hematocrit 29.0 L 36-48  %


 


Mean Corpuscular Volume 101.4 H 79-99  fL


 


Mean Corpuscular Hemoglobin 34.6 H 27.0-33.0  pg


 


Mean Corpuscular Hemoglobin


Concent 34.1 


 32.0-36.0  g/dL





 


Red Cell Distribution Width 12.0  11.0-15.5  %


 


Platelet Count 220  130-400  K/uL


 


Mean Platelet Volume 9.8  7.5-10.5  fL


 


Immature Granulocyte % (Auto) 4.1 H 0-1  %


 


Neutrophils (%) (Auto) 55.2  40.0-77.0  %


 


Lymphocytes (%) (Auto) 27.0  21.0-51.0  %


 


Monocytes (%) (Auto) 9.3  3.0-13.0  %


 


Eosinophils (%) (Auto) 3.7  0.0-8.0  %


 


Basophils (%) (Auto) 0.7  0.0-5.0  %


 


Neutrophils # (Auto) 5.0  1.8-7.7  K/uL


 


Lymphocytes # (Auto) 2.5  1.0-4.8  K/uL


 


Monocytes # (Auto) 0.8  0.1-1.0  K/uL


 


Eosinophils # (Auto) 0.34  0.00-0.70  K/uL


 


Basophils # (Auto) 0.06  0.00-0.20  K/uL


 


Absolute Immature Granulocyte


(auto 0.37 


 0-1  K/uL





 


Segmented Neutrophils % 56  40-70  %


 


Lymphocytes % (Manual) 34  22-44  %


 


Monocytes % (Manual) 8  2-9  %


 


Eosinophils % (Manual) 2  1-6  %


 


Nucleated Red Blood Cells 0.0  0.0-0.19  %


 


Differential Comment


 MANUAL


DIFFERENTIAL  





 


White Cell Morphology Comment NORMAL   


 


Platelet Morphology Comment ADEQUATE   


 


Red Blood Cell Morphology ANISO 2+   








                                 Chemistry Labs:








Test


 12/18/24


11:08 12/17/24


04:40 Range/Units


 


 


Whole Blood Glucose 101     MG/DL


 


Sodium Level  137  136-145  mmol/L


 


Potassium Level  3.5  3.5-5.1  mmol/L


 


Chloride Level  98 L 101-111  mmol/L


 


Carbon Dioxide Level  28  21-32  mmol/L


 


Blood Urea Nitrogen  59 H 7-18  mg/dL


 


Creatinine  6.8 H 0.5-1.0  mg/dL


 


Glomerular Filtration Rate


Calc 


 6 


 >90  mL/min





 


Random Glucose  93    mg/dL


 


Total Calcium  7.4 L 8.5-10.1  mg/dL


 


Phosphorus Level  7.8 H 2.5-4.9  mg/dL








                                Coagulation Labs:








Test


 12/18/24


08:50 Range/Units


 


 


Prothrombin Time 11.4  9.6-11.6  SEC


 


Prothromb Time International


Ratio 1.02 


 0.85-1.15  





 


Activated Partial


Thromboplast Time 29.1 


 26.3-35.5  SEC














DIAGNOSTICS / RADIOLOGY:


REASON: PICC LINE PLACEMENT


ORDERING PHYSICIAN: DULCE MARIA GARZA MD


PROCEDURE: CXR1VW  -  CHEST 1VW





CHEST 1VW





HISTORY: PICC line placement





COMPARISON: 12/10/2024





FINDINGS: A frontal projection of the chest was obtained. Mild bilateral


pulmonary infiltrates are seen may be related to mild pulmonary vascular


congestion with possible superimposed pneumonitis.  The heart is


borderline enlarged.  PICC line is seen entering from the right with


distal tip in the plane of the superior vena cava.  No evidence of


aortic calcification is seen.





IMPRESSION:


1. Mild bilateral pulmonary infiltrates are seen may be related to mild


pulmonary vascular congestion with possible superimposed pneumonitis. 











DICTATED BY: SVEN GONZALEZ MD


DATE: 12/18/24 1334





REASON: RT HEPATIC FLUID COLLECTION


ORDERING PHYSICIAN: ALON GOMEZ


PROCEDURE: DRNG LIVR  -  US PERC DRN, LIVER W IMG IR





US PERC DRN, LIVER W IMG IR





REASON: RT HEPATIC FLUID COLLECTION abscess





COMPARISON: Previous CT 12/10/2024. 





TECHNIQUE: Ultrasound-guided drain placement within right lobe hepatic


abscess. 





FINDINGS: Ultrasound demonstrates complex fluid collection within the


right lobe of the liver with air foci present.





PROCEDURE:





Informed consent obtained from the patient following explanation of


risk, benefits, complications.  Timeout performed by radiology nursing


staff.  Right abdomen was prepped and draped in sterile fashion. 


Patient received intravenous titrated doses of Versed and fentanyl


administered by radiology nursing personnel with continuous monitoring


of the oxygen saturation, chronic status, respiratory status.  10 mL of


1% lidocaine subcutaneous utilized.  Under direct ultrasound guidance,


access gained into the abscess collection utilizing 18-gauge needle. 


Purulent fluid was aspirated and submitted for Gram stain and cultures. 


Guidewire was placed and visualized on ultrasound.  Fascial dilatation


performed, 8 French drain was placed within the fluid collection under


direct ultrasound guidance.  Aspirate yielded purulent fluid.  Specimen


submitted for Gram stain and cultures.  Catheter connected to external


drainage bag.  Retention suture and sterile dressing applied.  Patient


tolerated procedure well without evidence of complication.





IMPRESSION: 


  Ultrasound-guided drain placement within right lobe hepatic abscess. 


Cultures pending.











DICTATED BY: FELIPE WHITFIELD DO


DATE: 12/12/24 1606





REASON: R/O ASCENDING CHOLANGITIS


ORDERING PHYSICIAN: RADHA ZUNIGA AGPCNAHOMI


PROCEDURE: MRCP WWO  -  MRCP(ABDWWO)CHOLANGIOPANCREATO





MRCP(ABDWWO)CHOLANGIOPANCREATO





REASON: R/O ASCENDING CHOLANGITIS





COMPARISON: CT abdomen and pelvis 12/10/2024





TECHNIQUE: Routine imaging protocol was performed. Images were also


obtained pre and postgadolinium contrast infusion, 20 cc Clariscan IV. 





FINDINGS: 





There is a 3 x 3.4 cm fluid collection in the anterior segment of the


right lobe of the liver, relatively high under the diaphragm. This has a


short air-fluid level. There is peripheral contrast enhancement.


Findings are consistent with a hepatic abscess.





There are no other focal liver lesions. Portal and hepatic veins appear


patent. Gallbladder is absent. There is a small amount of air in the


biliary tree, probably from previous S sphincterotomy. Intrahepatic


biliary tree does not appear distended. Common duct measures between 4


and 5 mm near the head of the pancreas.





There is renal cortical thinning moderate in degree. There are small


bilateral renal cysts. Spleen and pancreas appear normal. Pancreatic


duct is not dilated. There are no focal fluid collections. There is no


free air or fluid. Anterior abdominal wall appears intact.





IMPRESSION: 


  


1. 3 x 3.4 cm focal fluid collection in the anterior segment right lobe


of the liver, with an air-fluid level, consistent with abscess.


2. Absent gallbladder


3. Moderate bilateral renal cortical thinning, there are also bilateral


renal cysts.











DICTATED BY: JOSE CERVANTES MD


DATE: 12/11/24 1434





REASON: bilateral flank pain, ruq pain


ORDERING PHYSICIAN: BONNIE THOMPSON


PROCEDURE: ABD PEL WO  -  CT ABDOMEN/PELVIS W/O CONTRAST





CT ABDOMEN/PELVIS W/O CONTRAST





HISTORY:  Bilateral flank pain





COMPARISON: 11/5/2016





TECHNIQUE: Multiple sequential axial images of the abdomen and pelvis


were obtained from the dome of the diaphragm through symphysis pubis.


Patient was not given contrast through intravenous route. Oral contrast


was not given.





FINDINGS:  No pleural effusion is seen bilaterally.  There is no


evidence of parenchymal disease or pulmonary nodule of the visualized


lower lungs.  Degenerative changes of the thoracolumbar spine are


present. The heart is not enlarged.





There is 3.7 cm right hepatic nodule. Intrahepatic biliary air is seen.


Postcholecystectomy changes are seen. There are bilateral renal cortical


scarring. Bilateral renal vascular calcifications are seen. The liver,


spleen, adrenal glands and pancreas are unremarkable.  There is no


evidence of hydronephrosis bilaterally.  No evidence of renal stone is


seen. There is diverticulosis.  Fecal material is seen in the colon. 


There are normal size retroperitoneal and mesenteric lymph nodes.  No


ascites is seen.  Atherosclerotic changes are present. Uterus is


enlarged with calcifications suggestive of fibroid uterus.





Pelvic sidewalls are symmetric bilaterally. Bladder is poorly distended.





IMPRESSION:


1.  There is 3.7 cm right hepatic mass with neoplastic process not


excluded. Intrahepatic biliary air is seen with postcholecystectomy


changes. Fibroid uterus. Diverticulosis.

















CT was performed with one or more following dose reduction techniques:


automated exposure control, adjustment of the mA and kv according to


patient's size, or use of a iterative reconstruction technique.








DICTATED BY: SVEN GONZALEZ MD


DATE: 12/10/24 2220..REASON: cp


ORDERING PHYSICIAN: BONNIE THOMPSON


PROCEDURE: CXR1VW  -  CHEST 1VW





CHEST 1VW





REASON: cp





COMPARISON: 12/7/2024





FINDINGS:  


Single view of the chest was obtained. Lungs are clear.  There is mild


cardiac megaly, unchanged.  There is no pulmonary vascular congestion.


Mediastinum and bony thorax appear unremarkable.





IMPRESSION:





1.  Mild cardiomegaly, unchanged no acute finding.








DICTATED BY: JOSE CERVANTES MD


DATE: 12/11/24 6738





ASSESSMENT:  


End stage renal disease 


Anemia 


Sepsis


Hepatic Abscess 


Chronic Congestive Diastolic Heart Failure , Echo 2023 EF of 65% 


Primary hypertension 


HLD 


Diabetes mellitus II 





PLAN: 


Labs and Diagnostics/ Radiology personally reviewed and interpreted by myself 

and supervising physician


We have reviewed dialysis and external records in detail


Continue dialysis schedule Tuesday Thursday Saturday 


Continue with antibiotics as per ID.  


Case management coordinating SNF placement


1.5 L fluid restriction


Continue to monitor H&H


Epogen on dialysis days, as needed 


Continue with frequent monitoring of renal function, anemia, and electrolytes


Order CBC, BMP, and electrolytes in the morning


May use Dilaudid 0.5 mg IV every 6 hours as needed for severe pain


Monitor blood pressure adjust medication doses as needed


Maintain normotensive state


Strict intake, output, and daily weight should be monitored


Please renally adjust medications.


Avoid nephrotoxics and nonsteroidal drugs.


We will continue to monitor the patient closely


We have discussed with the other team physicians in detail about the care plan


ATTESTATION BY PHYSICIAN


I have seen and examined the patient. I reviewed the documentation, medical 

decision making, and treatment plan as noted by the mid-level provider above. I 

agree with the findings and plan of care.











PRISCA ASHER MD, [facility]             Dec 18, 2024 14:03

## 2025-02-05 VITALS
SYSTOLIC BLOOD PRESSURE: 199 MMHG | RESPIRATION RATE: 18 BRPM | HEART RATE: 70 BPM | DIASTOLIC BLOOD PRESSURE: 99 MMHG | TEMPERATURE: 98.3 F

## 2025-02-05 LAB
APTT PPP: 30.9 SEC (ref 26.3–35.5)
BASOPHILS # BLD AUTO: 0.03 K/UL (ref 0–0.2)
BASOPHILS NFR BLD AUTO: 0.5 % (ref 0–5)
BUN SERPL-MCNC: 44 MG/DL (ref 7–18)
CHLORIDE SERPL-SCNC: 101 MMOL/L (ref 101–111)
CO2 SERPL-SCNC: 28 MMOL/L (ref 21–32)
CREAT SERPL-MCNC: 5 MG/DL (ref 0.5–1)
EOSINOPHIL # BLD AUTO: 0.13 K/UL (ref 0–0.7)
EOSINOPHIL NFR BLD AUTO: 2 % (ref 0–8)
ERYTHROCYTE [DISTWIDTH] IN BLOOD BY AUTOMATED COUNT: 14 % (ref 11–15.5)
GFR SERPL CREATININE-BSD FRML MDRD: 9 ML/MIN (ref 90–?)
GLUCOSE SERPL-MCNC: 90 MG/DL (ref 70–105)
HCT VFR BLD AUTO: 29.4 % (ref 36–48)
IMM GRANULOCYTES # BLD: 0.05 K/UL (ref 0–1)
INR PPP: 1.03 (ref 0.85–1.15)
LYMPHOCYTES # SPEC AUTO: 2.6 K/UL (ref 1–4.8)
LYMPHOCYTES NFR SPEC AUTO: 40.7 % (ref 21–51)
MCH RBC QN AUTO: 35 PG (ref 27–33)
MCHC RBC AUTO-ENTMCNC: 33 G/DL (ref 32–36)
MCV RBC AUTO: 106.1 FL (ref 79–99)
MONOCYTES # BLD AUTO: 0.7 K/UL (ref 0.1–1)
MONOCYTES NFR BLD AUTO: 11.5 % (ref 3–13)
NEUTROPHILS # BLD AUTO: 2.9 K/UL (ref 1.8–7.7)
NEUTROPHILS NFR BLD AUTO: 44.5 % (ref 40–77)
NRBC BLD MANUAL-RTO: 0 % (ref 0–0.19)
PLATELET # BLD AUTO: 146 K/UL (ref 130–400)
POTASSIUM SERPL-SCNC: 4.1 MMOL/L (ref 3.5–5.1)
PROTHROMBIN TIME: 11.5 SEC (ref 9.6–11.6)
RBC # BLD AUTO: 2.77 MIL/UL (ref 4–5.5)
RBC MORPH BLD: (no result)
SODIUM SERPL-SCNC: 139 MMOL/L (ref 136–145)
WBC # BLD AUTO: 6.4 K/UL (ref 4.8–10.8)

## 2025-02-07 ENCOUNTER — HOSPITAL ENCOUNTER (OUTPATIENT)
Dept: HOSPITAL 90 - DAH | Age: 75
Discharge: HOME | End: 2025-02-07
Attending: STUDENT IN AN ORGANIZED HEALTH CARE EDUCATION/TRAINING PROGRAM
Payer: COMMERCIAL

## 2025-02-07 VITALS
RESPIRATION RATE: 17 BRPM | DIASTOLIC BLOOD PRESSURE: 48 MMHG | TEMPERATURE: 97.2 F | SYSTOLIC BLOOD PRESSURE: 151 MMHG | HEART RATE: 60 BPM

## 2025-02-07 VITALS
DIASTOLIC BLOOD PRESSURE: 60 MMHG | SYSTOLIC BLOOD PRESSURE: 200 MMHG | HEART RATE: 66 BPM | TEMPERATURE: 97.8 F | RESPIRATION RATE: 18 BRPM

## 2025-02-07 VITALS — RESPIRATION RATE: 16 BRPM | HEART RATE: 59 BPM | SYSTOLIC BLOOD PRESSURE: 184 MMHG | DIASTOLIC BLOOD PRESSURE: 52 MMHG

## 2025-02-07 VITALS
DIASTOLIC BLOOD PRESSURE: 49 MMHG | HEART RATE: 64 BPM | TEMPERATURE: 97.3 F | SYSTOLIC BLOOD PRESSURE: 140 MMHG | RESPIRATION RATE: 17 BRPM

## 2025-02-07 VITALS — HEART RATE: 64 BPM | SYSTOLIC BLOOD PRESSURE: 158 MMHG | RESPIRATION RATE: 17 BRPM | DIASTOLIC BLOOD PRESSURE: 49 MMHG

## 2025-02-07 VITALS — DIASTOLIC BLOOD PRESSURE: 51 MMHG | RESPIRATION RATE: 16 BRPM | HEART RATE: 65 BPM | SYSTOLIC BLOOD PRESSURE: 148 MMHG

## 2025-02-07 VITALS — RESPIRATION RATE: 16 BRPM | SYSTOLIC BLOOD PRESSURE: 160 MMHG | HEART RATE: 59 BPM | DIASTOLIC BLOOD PRESSURE: 47 MMHG

## 2025-02-07 VITALS — HEART RATE: 60 BPM | DIASTOLIC BLOOD PRESSURE: 48 MMHG | SYSTOLIC BLOOD PRESSURE: 154 MMHG | RESPIRATION RATE: 16 BRPM

## 2025-02-07 VITALS — HEART RATE: 58 BPM | DIASTOLIC BLOOD PRESSURE: 58 MMHG | RESPIRATION RATE: 16 BRPM | SYSTOLIC BLOOD PRESSURE: 156 MMHG

## 2025-02-07 VITALS — DIASTOLIC BLOOD PRESSURE: 47 MMHG | HEART RATE: 60 BPM | RESPIRATION RATE: 17 BRPM | SYSTOLIC BLOOD PRESSURE: 154 MMHG

## 2025-02-07 VITALS — BODY MASS INDEX: 39.42 KG/M2 | WEIGHT: 200.8 LBS | HEIGHT: 60 IN

## 2025-02-07 DIAGNOSIS — K75.0: ICD-10-CM

## 2025-02-07 DIAGNOSIS — I10: ICD-10-CM

## 2025-02-07 DIAGNOSIS — Z48.03: Primary | ICD-10-CM

## 2025-02-07 DIAGNOSIS — N19: ICD-10-CM

## 2025-02-07 DIAGNOSIS — E66.9: ICD-10-CM

## 2025-02-07 DIAGNOSIS — Z79.899: ICD-10-CM

## 2025-02-07 DIAGNOSIS — E11.9: ICD-10-CM

## 2025-02-07 DIAGNOSIS — Z90.49: ICD-10-CM

## 2025-02-07 PROCEDURE — A4223 INFUSION SUPPLIES W/O PUMP: HCPCS

## 2025-02-07 PROCEDURE — 36415 COLL VENOUS BLD VENIPUNCTURE: CPT

## 2025-02-07 PROCEDURE — A4222 INFUSION SUPPLIES WITH PUMP: HCPCS

## 2025-02-07 PROCEDURE — A4663 DIALYSIS BLOOD PRESSURE CUFF: HCPCS

## 2025-02-07 PROCEDURE — 85610 PROTHROMBIN TIME: CPT

## 2025-02-07 PROCEDURE — A4215 STERILE NEEDLE: HCPCS

## 2025-02-07 PROCEDURE — 49424 ASSESS CYST CONTRAST INJECT: CPT

## 2025-02-07 PROCEDURE — 82948 REAGENT STRIP/BLOOD GLUCOSE: CPT

## 2025-02-07 PROCEDURE — A4221 SUPP NON-INSULIN INF CATH/WK: HCPCS

## 2025-02-07 PROCEDURE — A4216 STERILE WATER/SALINE, 10 ML: HCPCS

## 2025-02-07 PROCEDURE — 80048 BASIC METABOLIC PNL TOTAL CA: CPT

## 2025-02-07 PROCEDURE — 85730 THROMBOPLASTIN TIME PARTIAL: CPT

## 2025-02-07 PROCEDURE — 76080 X-RAY EXAM OF FISTULA: CPT

## 2025-02-07 PROCEDURE — A4606 OXYGEN PROBE USED W OXIMETER: HCPCS

## 2025-02-07 PROCEDURE — 85025 COMPLETE CBC W/AUTO DIFF WBC: CPT

## 2025-02-07 RX ADMIN — SODIUM CHLORIDE SCH MLS/HR: 9 INJECTION, SOLUTION INTRAVENOUS at 11:33

## 2025-02-07 NOTE — [facility]
CATH LAB PROCEDURE REQUEST



INDICATION:   Hepatic abscess.  Chronic right hepatic drainage.



FINDINGS:   Abscess cavity is completely collapsed without evidence of

fistula formation following injection of pre-existing drain.



PROCEDURE:



Informed consent obtained from the patient following explanation of

risks, benefits, complications.  Timeout performed by nursing staff. 

The pre-existing hepatic drain was prepped and draped in sterile

fashion.  Under fluoroscopy, the drain was injected with 5 mL of

Omnipaque contrast media.  There is no refilling of abscess cavity or

evidence of fistula formation.  Contrast media leaked along the catheter

tract to the skin.  Catheter was removed.  Sterile dressing applied. 

Patient tolerated procedure well without evidence of complication.

Total fluoroscopic time: 0.2 minutes.



IMPRESSION:   Collapse of hepatic abscess cavity.  No fistula formation.

 Drainage catheter removed without incident.

## 2025-02-07 NOTE — PRN
CATH LAB PROCEDURE REQUEST





INDICATION:   Hepatic abscess.  Chronic right hepatic drainage.





FINDINGS:   Abscess cavity is completely collapsed without evidence of fistula 

formation following injection of pre-existing drain.





PROCEDURE:





Informed consent obtained from the patient following explanation of risks, 

benefits, complications.  Timeout performed by nursing staff.  The pre-existing 

hepatic drain was prepped and draped in sterile fashion.  Under fluoroscopy, the

drain was injected with 5 mL of Omnipaque contrast media.  There is no refilling

of abscess cavity or evidence of fistula formation.  Contrast media leaked along

the catheter tract to the skin.  Catheter was removed.  Sterile dressing 

applied.  Patient tolerated procedure well without evidence of complication.


Total fluoroscopic time: 0.2 minutes.





IMPRESSION:   Collapse of hepatic abscess cavity.  No fistula formation.  

Drainage catheter removed without incident.











FELIPE WHITFIELD DO            Feb 7, 2025 14:44